# Patient Record
Sex: FEMALE | Race: WHITE | Employment: UNEMPLOYED | URBAN - METROPOLITAN AREA
[De-identification: names, ages, dates, MRNs, and addresses within clinical notes are randomized per-mention and may not be internally consistent; named-entity substitution may affect disease eponyms.]

---

## 2017-06-28 RX ORDER — FLUOXETINE HYDROCHLORIDE 40 MG/1
CAPSULE ORAL
Qty: 30 CAPSULE | Refills: 5 | Status: SHIPPED | OUTPATIENT
Start: 2017-06-28 | End: 2017-12-13 | Stop reason: SDUPTHER

## 2017-12-06 RX ORDER — LISINOPRIL 10 MG/1
TABLET ORAL
Qty: 60 TABLET | Refills: 0 | Status: SHIPPED | OUTPATIENT
Start: 2017-12-06 | End: 2017-12-13

## 2017-12-13 ENCOUNTER — OFFICE VISIT (OUTPATIENT)
Dept: INTERNAL MEDICINE CLINIC | Age: 52
End: 2017-12-13

## 2017-12-13 VITALS
RESPIRATION RATE: 16 BRPM | HEIGHT: 63 IN | HEART RATE: 82 BPM | DIASTOLIC BLOOD PRESSURE: 80 MMHG | BODY MASS INDEX: 51.91 KG/M2 | WEIGHT: 293 LBS | OXYGEN SATURATION: 98 % | SYSTOLIC BLOOD PRESSURE: 136 MMHG

## 2017-12-13 DIAGNOSIS — E78.2 MIXED HYPERLIPIDEMIA: ICD-10-CM

## 2017-12-13 DIAGNOSIS — R53.83 FATIGUE, UNSPECIFIED TYPE: ICD-10-CM

## 2017-12-13 DIAGNOSIS — E66.09 CLASS 1 OBESITY DUE TO EXCESS CALORIES WITHOUT SERIOUS COMORBIDITY IN ADULT, UNSPECIFIED BMI: ICD-10-CM

## 2017-12-13 DIAGNOSIS — I10 ESSENTIAL HYPERTENSION: Primary | ICD-10-CM

## 2017-12-13 DIAGNOSIS — F33.0 MILD EPISODE OF RECURRENT MAJOR DEPRESSIVE DISORDER (HCC): ICD-10-CM

## 2017-12-13 LAB
A/G RATIO: 1.4 (ref 1.1–2.2)
ALBUMIN SERPL-MCNC: 4.5 G/DL (ref 3.4–5)
ALP BLD-CCNC: 122 U/L (ref 40–129)
ALT SERPL-CCNC: 23 U/L (ref 10–40)
ANION GAP SERPL CALCULATED.3IONS-SCNC: 14 MMOL/L (ref 3–16)
AST SERPL-CCNC: 20 U/L (ref 15–37)
BILIRUB SERPL-MCNC: 0.3 MG/DL (ref 0–1)
BUN BLDV-MCNC: 23 MG/DL (ref 7–20)
CALCIUM SERPL-MCNC: 10.2 MG/DL (ref 8.3–10.6)
CHLORIDE BLD-SCNC: 99 MMOL/L (ref 99–110)
CHOLESTEROL, TOTAL: 199 MG/DL (ref 0–199)
CO2: 27 MMOL/L (ref 21–32)
CREAT SERPL-MCNC: 0.6 MG/DL (ref 0.6–1.1)
GFR AFRICAN AMERICAN: >60
GFR NON-AFRICAN AMERICAN: >60
GLOBULIN: 3.2 G/DL
GLUCOSE BLD-MCNC: 88 MG/DL (ref 70–99)
HDLC SERPL-MCNC: 61 MG/DL (ref 40–60)
LDL CHOLESTEROL CALCULATED: 122 MG/DL
POTASSIUM SERPL-SCNC: 4.8 MMOL/L (ref 3.5–5.1)
SODIUM BLD-SCNC: 140 MMOL/L (ref 136–145)
TOTAL PROTEIN: 7.7 G/DL (ref 6.4–8.2)
TRIGL SERPL-MCNC: 79 MG/DL (ref 0–150)
TSH REFLEX: 2.04 UIU/ML (ref 0.27–4.2)
VLDLC SERPL CALC-MCNC: 16 MG/DL

## 2017-12-13 PROCEDURE — 99214 OFFICE O/P EST MOD 30 MIN: CPT | Performed by: INTERNAL MEDICINE

## 2017-12-13 PROCEDURE — 36415 COLL VENOUS BLD VENIPUNCTURE: CPT | Performed by: INTERNAL MEDICINE

## 2017-12-13 RX ORDER — COVID-19 ANTIGEN TEST
KIT MISCELLANEOUS PRN
COMMUNITY

## 2017-12-13 RX ORDER — FLUOXETINE HYDROCHLORIDE 40 MG/1
CAPSULE ORAL
Qty: 30 CAPSULE | Refills: 5 | Status: SHIPPED | OUTPATIENT
Start: 2017-12-13 | End: 2018-09-07 | Stop reason: SDUPTHER

## 2017-12-13 RX ORDER — LISINOPRIL 10 MG/1
TABLET ORAL
Qty: 60 TABLET | Refills: 5 | Status: SHIPPED | OUTPATIENT
Start: 2017-12-13 | End: 2018-08-03 | Stop reason: SDUPTHER

## 2017-12-13 ASSESSMENT — ENCOUNTER SYMPTOMS
COUGH: 0
CHEST TIGHTNESS: 0
GASTROINTESTINAL NEGATIVE: 1
SHORTNESS OF BREATH: 0
BACK PAIN: 1

## 2018-08-03 DIAGNOSIS — I10 ESSENTIAL HYPERTENSION: ICD-10-CM

## 2018-08-03 RX ORDER — LISINOPRIL 10 MG/1
TABLET ORAL
Qty: 60 TABLET | Refills: 5 | Status: SHIPPED | OUTPATIENT
Start: 2018-08-03 | End: 2019-02-13 | Stop reason: SDUPTHER

## 2018-09-07 DIAGNOSIS — F33.0 MILD EPISODE OF RECURRENT MAJOR DEPRESSIVE DISORDER (HCC): ICD-10-CM

## 2018-09-07 RX ORDER — FLUOXETINE HYDROCHLORIDE 40 MG/1
CAPSULE ORAL
Qty: 30 CAPSULE | Refills: 2 | Status: SHIPPED | OUTPATIENT
Start: 2018-09-07 | End: 2018-12-21 | Stop reason: SDUPTHER

## 2018-12-21 DIAGNOSIS — F33.0 MILD EPISODE OF RECURRENT MAJOR DEPRESSIVE DISORDER (HCC): ICD-10-CM

## 2018-12-21 RX ORDER — FLUOXETINE HYDROCHLORIDE 40 MG/1
CAPSULE ORAL
Qty: 30 CAPSULE | Refills: 2 | Status: SHIPPED | OUTPATIENT
Start: 2018-12-21 | End: 2019-03-29 | Stop reason: SDUPTHER

## 2019-02-13 DIAGNOSIS — I10 ESSENTIAL HYPERTENSION: ICD-10-CM

## 2019-02-13 RX ORDER — LISINOPRIL 10 MG/1
TABLET ORAL
Qty: 60 TABLET | Refills: 5 | Status: SHIPPED | OUTPATIENT
Start: 2019-02-13 | End: 2019-12-30

## 2019-03-29 DIAGNOSIS — F33.0 MILD EPISODE OF RECURRENT MAJOR DEPRESSIVE DISORDER (HCC): ICD-10-CM

## 2019-03-29 RX ORDER — FLUOXETINE HYDROCHLORIDE 40 MG/1
CAPSULE ORAL
Qty: 30 CAPSULE | Refills: 0 | Status: SHIPPED | OUTPATIENT
Start: 2019-03-29 | End: 2019-04-23 | Stop reason: SDUPTHER

## 2019-04-23 ENCOUNTER — OFFICE VISIT (OUTPATIENT)
Dept: INTERNAL MEDICINE CLINIC | Age: 54
End: 2019-04-23
Payer: COMMERCIAL

## 2019-04-23 VITALS
WEIGHT: 293 LBS | DIASTOLIC BLOOD PRESSURE: 82 MMHG | HEART RATE: 82 BPM | SYSTOLIC BLOOD PRESSURE: 142 MMHG | BODY MASS INDEX: 54.03 KG/M2 | OXYGEN SATURATION: 98 %

## 2019-04-23 DIAGNOSIS — F32.4 MAJOR DEPRESSIVE DISORDER WITH SINGLE EPISODE, IN PARTIAL REMISSION (HCC): ICD-10-CM

## 2019-04-23 DIAGNOSIS — E78.2 MIXED HYPERLIPIDEMIA: ICD-10-CM

## 2019-04-23 DIAGNOSIS — I10 ESSENTIAL HYPERTENSION: Primary | ICD-10-CM

## 2019-04-23 DIAGNOSIS — F33.0 MILD EPISODE OF RECURRENT MAJOR DEPRESSIVE DISORDER (HCC): ICD-10-CM

## 2019-04-23 LAB
ANION GAP SERPL CALCULATED.3IONS-SCNC: 14 MMOL/L (ref 3–16)
BUN BLDV-MCNC: 23 MG/DL (ref 7–20)
CALCIUM SERPL-MCNC: 9.8 MG/DL (ref 8.3–10.6)
CHLORIDE BLD-SCNC: 102 MMOL/L (ref 99–110)
CO2: 25 MMOL/L (ref 21–32)
CREAT SERPL-MCNC: 0.7 MG/DL (ref 0.6–1.1)
GFR AFRICAN AMERICAN: >60
GFR NON-AFRICAN AMERICAN: >60
GLUCOSE BLD-MCNC: 94 MG/DL (ref 70–99)
LDL CHOLESTEROL DIRECT: 125 MG/DL
POTASSIUM SERPL-SCNC: 4.7 MMOL/L (ref 3.5–5.1)
SODIUM BLD-SCNC: 141 MMOL/L (ref 136–145)

## 2019-04-23 PROCEDURE — 36415 COLL VENOUS BLD VENIPUNCTURE: CPT | Performed by: INTERNAL MEDICINE

## 2019-04-23 PROCEDURE — 99214 OFFICE O/P EST MOD 30 MIN: CPT | Performed by: INTERNAL MEDICINE

## 2019-04-23 RX ORDER — FLUOXETINE HYDROCHLORIDE 40 MG/1
CAPSULE ORAL
Qty: 30 CAPSULE | Refills: 5 | Status: SHIPPED | OUTPATIENT
Start: 2019-04-23 | End: 2020-01-10 | Stop reason: SDUPTHER

## 2019-04-23 ASSESSMENT — ENCOUNTER SYMPTOMS
RESPIRATORY NEGATIVE: 1
GASTROINTESTINAL NEGATIVE: 1

## 2019-04-23 NOTE — PROGRESS NOTES
Subjective:      Patient ID: Dianleys Baer is a 47 y.o. female. HPI  Koko Chaves is here for follow-up of HTN and depression. HTN - The patient denies any chest pain, shortness or breath, headaches or palpitations. There is no lower extremity edema. Continues to use the medication as prescribed (Lisininorpil) and is having no side effects. Depression - on Prozac 40 mg daily. Has been off the med for a few days and notices a difference. When using it she is calmer and mood is better. HL - not currently on a medication. Unfortunately her weight has not declined any. Review of Systems   Constitutional: Negative. Respiratory: Negative. Gastrointestinal: Negative. Musculoskeletal: Positive for arthralgias. Psychiatric/Behavioral: Negative for dysphoric mood. The patient is not nervous/anxious. 14 point ros otherwise negative. Objective:   Physical Exam   Constitutional: She is oriented to person, place, and time. No distress. Obese. Eyes: Pupils are equal, round, and reactive to light. EOM are normal.   Neck: No JVD present. Cardiovascular: Normal rate, regular rhythm and normal heart sounds. Pulmonary/Chest: Effort normal and breath sounds normal.   Abdominal: Soft. Bowel sounds are normal. There is no tenderness. Musculoskeletal: She exhibits no edema. Neurological: She is alert and oriented to person, place, and time. Psychiatric: She has a normal mood and affect. Assessment:      1. Essential hypertension    2. Mixed hyperlipidemia    3. Major depressive disorder with single episode, in partial remission ([facility] [address]    4. Mild episode of recurrent major depressive disorder ([facility] [address]            Plan:      Koko Chaves was seen today for medication refill. Diagnoses and all orders for this visit:    Essential hypertension, controlled  -     Basic Metabolic Panel; Future  - Continue Lisinopril    Mixed hyperlipidemia  -     LDL Cholesterol, Direct;  Future    Major depressive disorder with single episode, in partial remission (HCC)        - Continue Prozac    Mild episode of recurrent major depressive disorder (HCC)  -     FLUoxetine (PROZAC) 40 MG capsule; TAKE ONE CAPSULE BY MOUTH DAILY      Recommend colonoscopy and annual gyn checkups. Also counseled on diet and weight loss.           Louis Negron MD

## 2019-06-13 ENCOUNTER — TELEPHONE (OUTPATIENT)
Dept: INTERNAL MEDICINE CLINIC | Age: 54
End: 2019-06-13

## 2019-12-30 DIAGNOSIS — I10 ESSENTIAL HYPERTENSION: ICD-10-CM

## 2019-12-30 RX ORDER — LISINOPRIL 10 MG/1
TABLET ORAL
Qty: 180 TABLET | Refills: 0 | Status: SHIPPED | OUTPATIENT
Start: 2019-12-30 | End: 2022-06-22

## 2020-01-10 RX ORDER — FLUOXETINE HYDROCHLORIDE 40 MG/1
CAPSULE ORAL
Qty: 30 CAPSULE | Refills: 2 | Status: SHIPPED | OUTPATIENT
Start: 2020-01-10 | End: 2020-03-27

## 2020-01-10 NOTE — TELEPHONE ENCOUNTER
Requesting refill for FLUoxetine (PROZAC) 40 MG capsule     Richard Hand    Last ov 4.23.19  Last refill 4.23.19  Future Appointments   Date Time Provider Torres Flores   1/13/2020 10:40 AM Case Villareal MD [facility] IM MMA     rx pended

## 2020-01-21 ENCOUNTER — OFFICE VISIT (OUTPATIENT)
Dept: INTERNAL MEDICINE CLINIC | Age: 55
End: 2020-01-21
Payer: COMMERCIAL

## 2020-01-21 VITALS
BODY MASS INDEX: 51.91 KG/M2 | HEIGHT: 63 IN | DIASTOLIC BLOOD PRESSURE: 74 MMHG | SYSTOLIC BLOOD PRESSURE: 136 MMHG | RESPIRATION RATE: 18 BRPM | TEMPERATURE: 98.1 F | OXYGEN SATURATION: 98 % | HEART RATE: 91 BPM | WEIGHT: 293 LBS

## 2020-01-21 LAB
CHOLESTEROL, TOTAL: 188 MG/DL (ref 0–199)
HDLC SERPL-MCNC: 55 MG/DL (ref 40–60)
LDL CHOLESTEROL CALCULATED: 112 MG/DL
TRIGL SERPL-MCNC: 104 MG/DL (ref 0–150)
VLDLC SERPL CALC-MCNC: 21 MG/DL

## 2020-01-21 PROCEDURE — 90750 HZV VACC RECOMBINANT IM: CPT | Performed by: INTERNAL MEDICINE

## 2020-01-21 PROCEDURE — 90472 IMMUNIZATION ADMIN EACH ADD: CPT | Performed by: INTERNAL MEDICINE

## 2020-01-21 PROCEDURE — 90715 TDAP VACCINE 7 YRS/> IM: CPT | Performed by: INTERNAL MEDICINE

## 2020-01-21 PROCEDURE — 36415 COLL VENOUS BLD VENIPUNCTURE: CPT | Performed by: INTERNAL MEDICINE

## 2020-01-21 PROCEDURE — 90471 IMMUNIZATION ADMIN: CPT | Performed by: INTERNAL MEDICINE

## 2020-01-21 PROCEDURE — 99214 OFFICE O/P EST MOD 30 MIN: CPT | Performed by: INTERNAL MEDICINE

## 2020-01-21 SDOH — ECONOMIC STABILITY: TRANSPORTATION INSECURITY
IN THE PAST 12 MONTHS, HAS THE LACK OF TRANSPORTATION KEPT YOU FROM MEDICAL APPOINTMENTS OR FROM GETTING MEDICATIONS?: NO

## 2020-01-21 SDOH — ECONOMIC STABILITY: TRANSPORTATION INSECURITY
IN THE PAST 12 MONTHS, HAS LACK OF TRANSPORTATION KEPT YOU FROM MEETINGS, WORK, OR FROM GETTING THINGS NEEDED FOR DAILY LIVING?: NO

## 2020-01-21 SDOH — ECONOMIC STABILITY: INCOME INSECURITY: HOW HARD IS IT FOR YOU TO PAY FOR THE VERY BASICS LIKE FOOD, HOUSING, MEDICAL CARE, AND HEATING?: NOT HARD AT ALL

## 2020-01-21 SDOH — ECONOMIC STABILITY: FOOD INSECURITY: WITHIN THE PAST 12 MONTHS, YOU WORRIED THAT YOUR FOOD WOULD RUN OUT BEFORE YOU GOT MONEY TO BUY MORE.: NEVER TRUE

## 2020-01-21 SDOH — ECONOMIC STABILITY: FOOD INSECURITY: WITHIN THE PAST 12 MONTHS, THE FOOD YOU BOUGHT JUST DIDN'T LAST AND YOU DIDN'T HAVE MONEY TO GET MORE.: NEVER TRUE

## 2020-01-21 ASSESSMENT — ENCOUNTER SYMPTOMS
RESPIRATORY NEGATIVE: 1
GASTROINTESTINAL NEGATIVE: 1

## 2020-01-21 NOTE — PROGRESS NOTES
Lipid  Subjective:      Patient ID: Suzy Yu is a 49 y.o. female. HPI   Aline is here for a semi-annual exam.    HTN - The patient denies any chest pain, shortness or breath, headaches or palpitations. There is no lower extremity edema. Continues to use the medication as prescribed (Liisnopril) and is having no side effects. HL - on no medication. Her last LDL was 125. Depression - she remains on Prozac 40 mg daily. She is seeing a psyche in the [facility]. She finds it valuable. Review of Systems   Constitutional: Negative. Respiratory: Negative. Cardiovascular: Negative. Gastrointestinal: Negative. Musculoskeletal: Positive for arthralgias. Psychiatric/Behavioral: Positive for decreased concentration. 14 point ros otherwise negative. Objective:   Physical Exam  Constitutional:       Appearance: She is obese. Cardiovascular:      Rate and Rhythm: Normal rate and regular rhythm. Pulmonary:      Effort: Pulmonary effort is normal.      Breath sounds: Normal breath sounds. Abdominal:      General: There is no distension. Palpations: Abdomen is soft. Skin:     General: Skin is warm and dry. Neurological:      General: No focal deficit present. Mental Status: She is alert. Assessment:      1. Essential hypertension    2. Mixed hyperlipidemia    3. Breast cancer screening    4. Immunization due    5. Colon cancer screening            Plan:      Yue Berrios was seen today for hypertension. Diagnoses and all orders for this visit:    Essential hypertension, controlled        - Continue Lisinopril    Mixed hyperlipidemia  -     Lipid Panel; Future    Breast cancer screening  -     HOLLI DIGITAL SCREENING AUGMENTED BILATERAL; Future    Immunization due  -     Zoster recombinant [facility])  -     Tdap (age 6y and older) IM ([address])    Colon cancer screening  -     POCT Fecal Immunochemical Test (FIT);  Future - cannot afford

## 2020-01-23 RX ORDER — ATORVASTATIN CALCIUM 10 MG/1
10 TABLET, FILM COATED ORAL DAILY
Qty: 30 TABLET | Refills: 5 | Status: SHIPPED | OUTPATIENT
Start: 2020-01-23 | End: 2020-03-27 | Stop reason: SDUPTHER

## 2020-03-27 ENCOUNTER — OFFICE VISIT (OUTPATIENT)
Dept: FAMILY MEDICINE CLINIC | Age: 55
End: 2020-03-27
Payer: COMMERCIAL

## 2020-03-27 VITALS
DIASTOLIC BLOOD PRESSURE: 70 MMHG | BODY MASS INDEX: 53.57 KG/M2 | WEIGHT: 293 LBS | HEART RATE: 94 BPM | OXYGEN SATURATION: 97 % | SYSTOLIC BLOOD PRESSURE: 122 MMHG

## 2020-03-27 DIAGNOSIS — Z78.0 POSTMENOPAUSAL: ICD-10-CM

## 2020-03-27 DIAGNOSIS — E78.2 MIXED HYPERLIPIDEMIA: ICD-10-CM

## 2020-03-27 DIAGNOSIS — I10 ESSENTIAL HYPERTENSION: ICD-10-CM

## 2020-03-27 PROBLEM — L30.9 CHRONIC DERMATITIS: Status: ACTIVE | Noted: 2018-05-24

## 2020-03-27 PROBLEM — M70.61 TROCHANTERIC BURSITIS OF RIGHT HIP: Status: ACTIVE | Noted: 2020-03-27

## 2020-03-27 PROBLEM — Z80.8 FAMILY HISTORY OF MELANOMA: Status: ACTIVE | Noted: 2019-08-19

## 2020-03-27 LAB
A/G RATIO: 1.5 (ref 1.1–2.2)
ALBUMIN SERPL-MCNC: 4.5 G/DL (ref 3.4–5)
ALP BLD-CCNC: 125 U/L (ref 40–129)
ALT SERPL-CCNC: 20 U/L (ref 10–40)
ANION GAP SERPL CALCULATED.3IONS-SCNC: 13 MMOL/L (ref 3–16)
AST SERPL-CCNC: 18 U/L (ref 15–37)
BASOPHILS ABSOLUTE: 0.1 K/UL (ref 0–0.2)
BASOPHILS RELATIVE PERCENT: 0.5 %
BILIRUB SERPL-MCNC: 0.4 MG/DL (ref 0–1)
BUN BLDV-MCNC: 19 MG/DL (ref 7–20)
CALCIUM SERPL-MCNC: 10.3 MG/DL (ref 8.3–10.6)
CHLORIDE BLD-SCNC: 101 MMOL/L (ref 99–110)
CO2: 27 MMOL/L (ref 21–32)
CREAT SERPL-MCNC: 0.7 MG/DL (ref 0.6–1.1)
EOSINOPHILS ABSOLUTE: 0 K/UL (ref 0–0.6)
EOSINOPHILS RELATIVE PERCENT: 0.1 %
GFR AFRICAN AMERICAN: >60
GFR NON-AFRICAN AMERICAN: >60
GLOBULIN: 3 G/DL
GLUCOSE BLD-MCNC: 92 MG/DL (ref 70–99)
HCT VFR BLD CALC: 42.9 % (ref 36–48)
HEMOGLOBIN: 13.9 G/DL (ref 12–16)
LYMPHOCYTES ABSOLUTE: 2.7 K/UL (ref 1–5.1)
LYMPHOCYTES RELATIVE PERCENT: 22.4 %
MCH RBC QN AUTO: 27.6 PG (ref 26–34)
MCHC RBC AUTO-ENTMCNC: 32.3 G/DL (ref 31–36)
MCV RBC AUTO: 85.3 FL (ref 80–100)
MONOCYTES ABSOLUTE: 1 K/UL (ref 0–1.3)
MONOCYTES RELATIVE PERCENT: 8.2 %
NEUTROPHILS ABSOLUTE: 8.4 K/UL (ref 1.7–7.7)
NEUTROPHILS RELATIVE PERCENT: 68.8 %
PDW BLD-RTO: 14.8 % (ref 12.4–15.4)
PLATELET # BLD: 312 K/UL (ref 135–450)
PMV BLD AUTO: 8.2 FL (ref 5–10.5)
POTASSIUM SERPL-SCNC: 4.9 MMOL/L (ref 3.5–5.1)
RBC # BLD: 5.03 M/UL (ref 4–5.2)
SODIUM BLD-SCNC: 141 MMOL/L (ref 136–145)
TOTAL PROTEIN: 7.5 G/DL (ref 6.4–8.2)
TSH REFLEX: 1.99 UIU/ML (ref 0.27–4.2)
VITAMIN D 25-HYDROXY: 19.9 NG/ML
WBC # BLD: 12.2 K/UL (ref 4–11)

## 2020-03-27 PROCEDURE — 99214 OFFICE O/P EST MOD 30 MIN: CPT | Performed by: FAMILY MEDICINE

## 2020-03-27 RX ORDER — LISINOPRIL 20 MG/1
20 TABLET ORAL DAILY
Qty: 90 TABLET | Refills: 1 | Status: SHIPPED | OUTPATIENT
Start: 2020-03-27 | End: 2020-09-28

## 2020-03-27 RX ORDER — ATORVASTATIN CALCIUM 10 MG/1
10 TABLET, FILM COATED ORAL DAILY
Qty: 90 TABLET | Refills: 1 | Status: SHIPPED | OUTPATIENT
Start: 2020-03-27 | End: 2020-06-15 | Stop reason: SDUPTHER

## 2020-03-27 RX ORDER — FLUOXETINE HYDROCHLORIDE 20 MG/1
60 CAPSULE ORAL DAILY
Qty: 90 CAPSULE | Refills: 1 | Status: SHIPPED | OUTPATIENT
Start: 2020-03-27 | End: 2020-03-30 | Stop reason: SDUPTHER

## 2020-03-27 ASSESSMENT — ENCOUNTER SYMPTOMS
ABDOMINAL PAIN: 0
VOMITING: 0
NAUSEA: 0
COLOR CHANGE: 0
BACK PAIN: 0
CHEST TIGHTNESS: 0
SHORTNESS OF BREATH: 0

## 2020-03-27 NOTE — PATIENT INSTRUCTIONS
1200 mg calcium  2000 IU Vitamin D3      Patient Education        Trochanteric Bursitis: Exercises  Introduction  Here are some examples of exercises for you to try. The exercises may be suggested for a condition or for rehabilitation. Start each exercise slowly. Ease off the exercises if you start to have pain. You will be told when to start these exercises and which ones will work best for you. How to do the exercises  Hamstring wall stretch   1. Lie on your back in a doorway, with your good leg through the open door. 2. Slide your affected leg up the wall to straighten your knee. You should feel a gentle stretch down the back of your leg. 1. Do not arch your back. 2. Do not bend either knee. 3. Keep one heel touching the floor and the other heel touching the wall. Do not point your toes. 3. Hold the stretch for at least 1 minute to begin. Then try to lengthen the time you hold the stretch to as long as 6 minutes. 4. Repeat 2 to 4 times. 5. If you do not have a place to do this exercise in a doorway, there is another way to do it:  6. Lie on your back, and bend the knee of your affected leg. 7. Loop a towel under the ball and toes of that foot, and hold the ends of the towel in your hands. 8. Straighten your knee, and slowly pull back on the towel. You should feel a gentle stretch down the back of your leg. 9. Hold the stretch for 15 to 30 seconds. Or even better, hold the stretch for 1 minute if you can. 10. Repeat 2 to 4 times. Straight-leg raises to the outside   1. Lie on your side, with your affected leg on top. 2. Tighten the front thigh muscles of your top leg to keep your knee straight. 3. Keep your hip and your leg straight in line with the rest of your body, and keep your knee pointing forward. Do not drop your hip back. 4. Lift your top leg straight up toward the ceiling, about 12 inches off the floor. Hold for about 6 seconds, then slowly lower your leg. 5. Repeat 8 to 12 times. Clamshell   1. Lie on your side, with your affected leg on top and your head propped on a pillow. Keep your feet and knees together and your knees bent. 2. Raise your top knee, but keep your feet together. Do not let your hips roll back. Your legs should open up like a clamshell. 3. Hold for 6 seconds. 4. Slowly lower your knee back down. Rest for 10 seconds. 5. Repeat 8 to 12 times. Standing quadriceps stretch   1. If you are not steady on your feet, hold on to a chair, counter, or wall. You can also lie on your stomach or your side to do this exercise. 2. Bend the knee of the leg you want to stretch, and reach behind you to grab the front of your foot or ankle with the hand on the same side. For example, if you are stretching your right leg, use your right hand. 3. Keeping your knees next to each other, pull your foot toward your buttock until you feel a gentle stretch across the front of your hip and down the front of your thigh. Your knee should be pointed directly to the ground, and not out to the side. 4. Hold the stretch for 15 to 30 seconds. 5. Repeat 2 to 4 times. Piriformis stretch   1. Lie on your back with your legs straight. 2. Lift your affected leg and bend your knee. With your opposite hand, reach across your body, and then gently pull your knee toward your opposite shoulder. 3. Hold the stretch for 15 to 30 seconds. 4. Repeat 2 to 4 times. Double knee-to-chest   1. Lie on your back with your knees bent and your feet flat on the floor. You can put a small pillow under your head and neck if it is more comfortable. 2. Bring both knees to your chest.  3. Keep your lower back pressed to the floor. Hold for 15 to 30 seconds. 4. Relax, and lower your knees to the starting position. 5. Repeat 2 to 4 times. Follow-up care is a key part of your treatment and safety. Be sure to make and go to all appointments, and call your doctor if you are having problems.  It's also a good idea to know your test results and keep a list of the medicines you take. Where can you learn more? Go to https://chpepiceweb.[email or web address] org and sign in to your Neonode account. Enter Z391 in the [facility] box to learn more about \"Trochanteric Bursitis: Exercises. \"     If you do not have an account, please click on the \"Sign Up Now\" link. Current as of: June 26, 2019Content Version: 12.4  © 6282-5560 Healthwise, Incorporated. Care instructions adapted under license by [facility] ([facility]). If you have questions about a medical condition or this instruction, always ask your healthcare professional. [place] [address] any warranty or liability for your use of this information. Patient Education        Hip Bursitis: Exercises  Introduction  Here are some examples of exercises for you to try. The exercises may be suggested for a condition or for rehabilitation. Start each exercise slowly. Ease off the exercises if you start to have pain. You will be told when to start these exercises and which ones will work best for you. How to do the exercises  Hip rotator stretch   1. Lie on your back with both knees bent and your feet flat on the floor. 2. Put the ankle of your affected leg on your opposite thigh near your knee. 3. Use your hand to gently push your knee away from your body until you feel a gentle stretch around your hip. 4. Hold the stretch for 15 to 30 seconds. 5. Repeat 2 to 4 times. 6. Repeat steps 1 through 5, but this time use your hand to gently pull your knee toward your opposite shoulder. Iliotibial band stretch   1. Lean sideways against a wall. If you are not steady on your feet, hold on to a chair or counter. 2. Stand on the leg with the affected hip, with that leg close to the wall. Then cross your other leg in front of it. 3. Let your affected hip drop out to the side of your body and against wall.  Then lean away from your affected hip until you feel a stretch. 4. Hold the stretch for 15 to 30 seconds. 5. Repeat 2 to 4 times. Straight-leg raises to the outside   1. Lie on your side, with your affected hip on top. 2. Tighten the front thigh muscles of your top leg to keep your knee straight. 3. Keep your hip and your leg straight in line with the rest of your body, and keep your knee pointing forward. Do not drop your hip back. 4. Lift your top leg straight up toward the ceiling, about 12 inches off the floor. Hold for about 6 seconds, then slowly lower your leg. 5. Repeat 8 to 12 times. Clamshell   1. Lie on your side, with your affected hip on top and your head propped on a pillow. Keep your feet and knees together and your knees bent. 2. Raise your top knee, but keep your feet together. Do not let your hips roll back. Your legs should open up like a clamshell. 3. Hold for 6 seconds. 4. Slowly lower your knee back down. Rest for 10 seconds. 5. Repeat 8 to 12 times. Follow-up care is a key part of your treatment and safety. Be sure to make and go to all appointments, and call your doctor if you are having problems. It's also a good idea to know your test results and keep a list of the medicines you take. Where can you learn more? Go to https://[employer] R[employer] Studiosrdeb.[email or web address] org and sign in to your ItrybeforeIbuy account. Enter U079 in the [employer] box to learn more about \"Hip Bursitis: Exercises. \"     If you do not have an account, please click on the \"Sign Up Now\" link. Current as of: June 26, 2019Content Version: 12.4  © 4072-1900 Healthwise, Incorporated. Care instructions adapted under license by [facility] ([facility]). If you have questions about a medical condition or this instruction, always ask your healthcare professional. [place] [address] any warranty or liability for your use of this information.          Patient Education        Hip Bursitis: Care Instructions  Your Care Instructions    Bursitis is inflammation of the bursa. A bursa is a small sac of fluid that cushions a joint and helps it move easily. A bursa sits between a bone in the hip and the muscles and tendons in the thigh and buttock. Injury or overuse of the hip can cause bursitis. Activities that can lead to bursitis include twisting and rapid joint movement. Bursitis can cause hip pain. Bursitis usually gets better if you avoid the activity that caused it. If pain lasts or gets worse despite home treatment, your doctor may draw fluid from the bursa through a needle. This may relieve your pain and help your doctor know if you have an infection. If so, your doctor will prescribe antibiotics. If you have inflammation only, you may get a corticosteroid shot to reduce swelling and pain. Sometimes surgery is needed to drain or remove the bursa. Follow-up care is a key part of your treatment and safety. Be sure to make and go to all appointments, and call your doctor if you are having problems. It's also a good idea to know your test results and keep a list of the medicines you take. How can you care for yourself at home? · Put ice or a cold pack on your hip for 10 to 20 minutes at a time. Put a thin cloth between the ice and your skin. · After 3 days of using ice, you may use heat on your hip. You can use a hot water bottle, a heating pad set on low, or a warm, moist towel. · Rest your hip. Stop any activities that cause pain. Switch to activities that do not stress your hip. · Take your medicines exactly as prescribed. Call your doctor if you think you are having a problem with your medicine. · Ask your doctor if you can take an over-the-counter pain medicine, such as acetaminophen (Tylenol), ibuprofen (Advil, Motrin), or naproxen (Aleve). Be safe with medicines. Read and follow all instructions on the label. · To prevent stiffness, gently move the hip joint as much as you can without pain every day.  As the pain gets better, keep doing range-of-motion

## 2020-03-27 NOTE — PROGRESS NOTES
3/27/2020    This is a 49 y.o. female who presents for  Chief Complaint   Patient presents with    New Patient    Established New Doctor       HPI:     HTN:   Taking medication daily  Checking Bps at home  No new AEs to the medication  No acute concerning Sxs: No CP, SOB, palpitations, dizziness, HA, visual changes, diaphoresis, nausea, etc.     HLD: taking Lipitor nightly, no new AEs  Started in January    Moods: stable on Prozac  No SI/HI     Dr. Chepe Arias, sees once a year, prescribes the minocycline  Sees podiatry     LMP at age 45  Hx of IVF, one round with donor eggs     + R hip pain  On the side  Intermittent      Past Medical History:   Diagnosis Date    Depression     Hypertension        Past Surgical History:   Procedure Laterality Date    CYST REMOVAL      from head not residual problems       Social History     Socioeconomic History    Marital status:      Spouse name: Not on file    Number of children: Not on file    Years of education: Not on file    Highest education level: Not on file   Occupational History    Not on file   Social Needs    Financial resource strain: Not hard at all   [employer] insecurity     Worry: Never true     Inability: Never true   [employer] needs     Medical: No     Non-medical: No   Tobacco Use    Smoking status: Never Smoker    Smokeless tobacco: Never Used   Substance and Sexual Activity    Alcohol use: No    Drug use: No    Sexual activity: Yes     Partners: Male   Lifestyle    Physical activity     Days per week: Not on file     Minutes per session: Not on file    Stress: Not on file   Relationships    Social connections     Talks on phone: Not on file     Gets together: Not on file     Attends Anabaptism service: Not on file     Active member of club or organization: Not on file     Attends meetings of clubs or organizations: Not on file     Relationship status: Not on file    Intimate partner violence     Fear of current or ex partner: Not on file     Emotionally abused: Not on file     Physically abused: Not on file     Forced sexual activity: Not on file   Other Topics Concern    Not on file   Social History Narrative    Not on file       No family history on file. Current Outpatient Medications   Medication Sig Dispense Refill    FLUoxetine (PROZAC) 20 MG capsule Take 3 capsules by mouth daily TAKE ONE CAPSULE BY MOUTH DAILY 90 capsule 1    atorvastatin (LIPITOR) 10 MG tablet Take 1 tablet by mouth daily 90 tablet 1    lisinopril (PRINIVIL;ZESTRIL) 20 MG tablet Take 1 tablet by mouth daily 90 tablet 1    lisinopril (PRINIVIL;ZESTRIL) 10 MG tablet TAKE ONE TABLET BY MOUTH TWICE A  tablet 0    Naproxen Sodium (ALEVE) 220 MG CAPS Take by mouth as needed for Pain      minocycline (MINOCIN;DYNACIN) 100 MG capsule 2 capsules daily        No current facility-administered medications for this visit. Immunization History   Administered Date(s) Administered    Influenza A ([id number]) Vaccine Nasal 12/15/2009    Tdap (Boostrix, Adacel) 01/21/2020    Zoster Recombinant (Shingrix) 01/21/2020       No Known Allergies    Review of Systems   Constitutional: Negative for activity change, appetite change, chills, diaphoresis, fatigue, fever and unexpected weight change. Eyes: Negative for visual disturbance. Respiratory: Negative for chest tightness and shortness of breath. Cardiovascular: Negative for chest pain, palpitations and leg swelling. Gastrointestinal: Negative for abdominal pain, nausea and vomiting. Genitourinary: Negative for decreased urine volume. Musculoskeletal: Positive for arthralgias. Negative for back pain, gait problem, joint swelling and myalgias. Skin: Negative for color change. Neurological: Negative for dizziness, tremors, seizures, weakness, light-headedness, numbness and headaches. Psychiatric/Behavioral: Positive for dysphoric mood and sleep disturbance.  Negative for agitation, behavioral Test (FIT); Future    3. Essential hypertension  Stable. Continue with current care plan, which was discussed in detail with the patient. C/w Lisinopril 20 mg daily   - Comprehensive Metabolic Panel; Future  - CBC Auto Differential; Future  - Hemoglobin A1C; Future  - TSH with Reflex; Future    4. Mixed hyperlipidemia  C/w Lipitor 10 mg  Lipid panel due in 1 mo  Check LFTs today  - Hemoglobin A1C; Future  - Lipid Panel; Future    5. Mild episode of recurrent major depressive disorder ([facility] [address]  Inc to 60 mg  Sees a therapist twice monthly  No SI/HI  - FLUoxetine (PROZAC) 20 MG capsule; Take 3 capsules by mouth daily TAKE ONE CAPSULE BY MOUTH DAILY  Dispense: 90 capsule; Refill: 1    6. Postmenopausal  LMP age 45  Discussed Ca/Vit D3 recommendations, in AVS  - Vitamin D 25 Hydroxy; Future    7. Trochanteric bursitis of right hip  Daily heat/exercises/ice regiment discussed in detail, h/o's given   NSAIDs scheduled BID with food for 10-14 days      While assessing care for this patient, I have reviewed all pertinent lab work/imaging/ specialist notes and care in reference to those problems addressed above in detail. Appropriate medical decision making was based on this. Please note that portions of this note may have been completed with a voice recognition program. Efforts were made to edit the dictations but occasionally words are mis-transcribed. Return in about 4 weeks (around 4/24/2020) for e visit for follow up medication changes, and then in 6 mo for f/u HTN.

## 2020-03-28 LAB
ESTIMATED AVERAGE GLUCOSE: 119.8 MG/DL
HBA1C MFR BLD: 5.8 %

## 2020-03-30 ENCOUNTER — TELEPHONE (OUTPATIENT)
Dept: FAMILY MEDICINE CLINIC | Age: 55
End: 2020-03-30

## 2020-03-30 RX ORDER — FLUOXETINE HYDROCHLORIDE 20 MG/1
60 CAPSULE ORAL DAILY
Qty: 90 CAPSULE | Refills: 1 | Status: SHIPPED | OUTPATIENT
Start: 2020-03-30 | End: 2020-06-02 | Stop reason: SDUPTHER

## 2020-06-02 RX ORDER — FLUOXETINE HYDROCHLORIDE 20 MG/1
CAPSULE ORAL
Qty: 90 CAPSULE | Refills: 0 | Status: SHIPPED | OUTPATIENT
Start: 2020-06-02 | End: 2020-06-15 | Stop reason: SDUPTHER

## 2020-06-02 NOTE — TELEPHONE ENCOUNTER
.  Last office visit 3/27/2020     Last written 3/30/20 #90 1 refill    Next office visit scheduled none    Requested Prescriptions     Pending Prescriptions Disp Refills    FLUoxetine (PROZAC) 20 MG capsule [Pharmacy Med Name: FLUoxetine HCL 20 MG CAPSULE] 90 capsule 0     Sig: TAKE THREE CAPSULES BY MOUTH DAILY

## 2020-06-15 ENCOUNTER — TELEPHONE (OUTPATIENT)
Dept: FAMILY MEDICINE CLINIC | Age: 55
End: 2020-06-15

## 2020-06-15 RX ORDER — FLUOXETINE HYDROCHLORIDE 20 MG/1
CAPSULE ORAL
Qty: 270 CAPSULE | Refills: 1 | Status: SHIPPED | OUTPATIENT
Start: 2020-06-15 | End: 2021-01-25

## 2020-06-15 RX ORDER — ATORVASTATIN CALCIUM 10 MG/1
10 TABLET, FILM COATED ORAL DAILY
Qty: 90 TABLET | Refills: 1 | Status: SHIPPED | OUTPATIENT
Start: 2020-06-15 | End: 2021-06-03

## 2020-06-15 NOTE — TELEPHONE ENCOUNTER
Pt would like refill for fluoxetine 20 mg capsule and atorvastatin 10 mg tablet. She said it is a new medicine. Would like to know if doctor needs appt with her to talk about it (vv ok) or have blood work done. Please advise.   [address] [facility]

## 2020-06-16 DIAGNOSIS — E78.2 MIXED HYPERLIPIDEMIA: ICD-10-CM

## 2020-06-17 LAB
CHOLESTEROL, TOTAL: 158 MG/DL (ref 0–199)
HDLC SERPL-MCNC: 54 MG/DL (ref 40–60)
LDL CHOLESTEROL CALCULATED: 90 MG/DL
TRIGL SERPL-MCNC: 68 MG/DL (ref 0–150)
VLDLC SERPL CALC-MCNC: 14 MG/DL

## 2020-09-28 RX ORDER — LISINOPRIL 20 MG/1
TABLET ORAL
Qty: 90 TABLET | Refills: 1 | Status: SHIPPED | OUTPATIENT
Start: 2020-09-28 | End: 2021-04-21

## 2021-01-25 DIAGNOSIS — F33.0 MILD EPISODE OF RECURRENT MAJOR DEPRESSIVE DISORDER (HCC): ICD-10-CM

## 2021-01-25 RX ORDER — FLUOXETINE HYDROCHLORIDE 20 MG/1
CAPSULE ORAL
Qty: 270 CAPSULE | Refills: 0 | Status: SHIPPED | OUTPATIENT
Start: 2021-01-25 | End: 2021-04-21

## 2021-01-25 NOTE — TELEPHONE ENCOUNTER
.  Last office visit 3/27/2020     Last written 6- 270 with 1      Next office visit scheduled Visit date not found    Requested Prescriptions     Pending Prescriptions Disp Refills    FLUoxetine (PROZAC) 20 MG capsule [Pharmacy Med Name: FLUoxetine HCL 20 MG CAPSULE] 270 capsule 0     Sig: TAKE THREE CAPSULES BY MOUTH DAILY

## 2021-04-21 DIAGNOSIS — F33.0 MILD EPISODE OF RECURRENT MAJOR DEPRESSIVE DISORDER (HCC): ICD-10-CM

## 2021-04-21 RX ORDER — LISINOPRIL 20 MG/1
TABLET ORAL
Qty: 90 TABLET | Refills: 0 | Status: SHIPPED | OUTPATIENT
Start: 2021-04-21 | End: 2021-06-21 | Stop reason: ALTCHOICE

## 2021-04-21 RX ORDER — FLUOXETINE HYDROCHLORIDE 20 MG/1
CAPSULE ORAL
Qty: 270 CAPSULE | Refills: 0 | Status: SHIPPED | OUTPATIENT
Start: 2021-04-21 | End: 2021-07-26

## 2021-04-21 NOTE — TELEPHONE ENCOUNTER
Refill Request     Last Seen: 3/27/2020    Last Written: 1/25/2021    Next Appointment:   No future appointments.           Requested Prescriptions     Pending Prescriptions Disp Refills    FLUoxetine (PROZAC) 20 MG capsule [Pharmacy Med Name: FLUoxetine HCL 20 MG CAPSULE] 270 capsule 0     Sig: TAKE THREE CAPSULES BY MOUTH DAILY    lisinopril (PRINIVIL;ZESTRIL) 20 MG tablet [Pharmacy Med Name: LISINOPRIL 20 MG TABLET] 90 tablet 0     Sig: TAKE ONE TABLET BY MOUTH DAILY

## 2021-06-21 ENCOUNTER — TELEPHONE (OUTPATIENT)
Dept: GASTROENTEROLOGY | Age: 56
End: 2021-06-21

## 2021-06-21 ENCOUNTER — HOSPITAL ENCOUNTER (OUTPATIENT)
Dept: GENERAL RADIOLOGY | Age: 56
Discharge: HOME | End: 2021-06-21
Payer: COMMERCIAL

## 2021-06-21 ENCOUNTER — OFFICE VISIT (OUTPATIENT)
Dept: FAMILY MEDICINE CLINIC | Age: 56
End: 2021-06-21
Payer: COMMERCIAL

## 2021-06-21 ENCOUNTER — HOSPITAL ENCOUNTER (OUTPATIENT)
Age: 56
Discharge: HOME | End: 2021-06-21
Payer: COMMERCIAL

## 2021-06-21 VITALS
WEIGHT: 293 LBS | HEIGHT: 63 IN | OXYGEN SATURATION: 97 % | HEART RATE: 90 BPM | BODY MASS INDEX: 51.91 KG/M2 | SYSTOLIC BLOOD PRESSURE: 130 MMHG | DIASTOLIC BLOOD PRESSURE: 72 MMHG

## 2021-06-21 DIAGNOSIS — Z12.31 ENCOUNTER FOR SCREENING MAMMOGRAM FOR MALIGNANT NEOPLASM OF BREAST: ICD-10-CM

## 2021-06-21 DIAGNOSIS — I10 ESSENTIAL HYPERTENSION: ICD-10-CM

## 2021-06-21 DIAGNOSIS — G89.29 CHRONIC PAIN OF RIGHT HIP: ICD-10-CM

## 2021-06-21 DIAGNOSIS — M25.551 CHRONIC PAIN OF RIGHT HIP: ICD-10-CM

## 2021-06-21 DIAGNOSIS — Z12.11 COLON CANCER SCREENING: ICD-10-CM

## 2021-06-21 DIAGNOSIS — Z00.00 ANNUAL PHYSICAL EXAM: Primary | ICD-10-CM

## 2021-06-21 DIAGNOSIS — Z00.00 ANNUAL PHYSICAL EXAM: ICD-10-CM

## 2021-06-21 DIAGNOSIS — M25.841 CYST OF JOINT OF HAND, RIGHT: ICD-10-CM

## 2021-06-21 DIAGNOSIS — E78.2 MIXED HYPERLIPIDEMIA: ICD-10-CM

## 2021-06-21 DIAGNOSIS — N39.46 MIXED STRESS AND URGE URINARY INCONTINENCE: ICD-10-CM

## 2021-06-21 LAB
A/G RATIO: 1.4 (ref 1.1–2.2)
ALBUMIN SERPL-MCNC: 4.3 G/DL (ref 3.4–5)
ALP BLD-CCNC: 129 U/L (ref 40–129)
ALT SERPL-CCNC: 18 U/L (ref 10–40)
ANION GAP SERPL CALCULATED.3IONS-SCNC: 12 MMOL/L (ref 3–16)
AST SERPL-CCNC: 14 U/L (ref 15–37)
BASOPHILS ABSOLUTE: 0.1 K/UL (ref 0–0.2)
BASOPHILS RELATIVE PERCENT: 0.6 %
BILIRUB SERPL-MCNC: 0.4 MG/DL (ref 0–1)
BUN BLDV-MCNC: 20 MG/DL (ref 7–20)
CALCIUM SERPL-MCNC: 9.8 MG/DL (ref 8.3–10.6)
CHLORIDE BLD-SCNC: 104 MMOL/L (ref 99–110)
CHOLESTEROL, TOTAL: 150 MG/DL (ref 0–199)
CO2: 27 MMOL/L (ref 21–32)
CREAT SERPL-MCNC: 0.6 MG/DL (ref 0.6–1.1)
EOSINOPHILS ABSOLUTE: 0 K/UL (ref 0–0.6)
EOSINOPHILS RELATIVE PERCENT: 0.1 %
GFR AFRICAN AMERICAN: >60
GFR NON-AFRICAN AMERICAN: >60
GLOBULIN: 3 G/DL
GLUCOSE BLD-MCNC: 97 MG/DL (ref 70–99)
HCT VFR BLD CALC: 39.9 % (ref 36–48)
HDLC SERPL-MCNC: 56 MG/DL (ref 40–60)
HEMOGLOBIN: 13.2 G/DL (ref 12–16)
LDL CHOLESTEROL CALCULATED: 80 MG/DL
LYMPHOCYTES ABSOLUTE: 2.3 K/UL (ref 1–5.1)
LYMPHOCYTES RELATIVE PERCENT: 24.5 %
MCH RBC QN AUTO: 28.2 PG (ref 26–34)
MCHC RBC AUTO-ENTMCNC: 33.1 G/DL (ref 31–36)
MCV RBC AUTO: 85.2 FL (ref 80–100)
MONOCYTES ABSOLUTE: 0.6 K/UL (ref 0–1.3)
MONOCYTES RELATIVE PERCENT: 6.1 %
NEUTROPHILS ABSOLUTE: 6.5 K/UL (ref 1.7–7.7)
NEUTROPHILS RELATIVE PERCENT: 68.7 %
PDW BLD-RTO: 15 % (ref 12.4–15.4)
PLATELET # BLD: 296 K/UL (ref 135–450)
PMV BLD AUTO: 8.6 FL (ref 5–10.5)
POTASSIUM SERPL-SCNC: 5.1 MMOL/L (ref 3.5–5.1)
RBC # BLD: 4.68 M/UL (ref 4–5.2)
SODIUM BLD-SCNC: 143 MMOL/L (ref 136–145)
TOTAL PROTEIN: 7.3 G/DL (ref 6.4–8.2)
TRIGL SERPL-MCNC: 71 MG/DL (ref 0–150)
TSH REFLEX: 1.93 UIU/ML (ref 0.27–4.2)
VITAMIN D 25-HYDROXY: 18.1 NG/ML
VLDLC SERPL CALC-MCNC: 14 MG/DL
WBC # BLD: 9.5 K/UL (ref 4–11)

## 2021-06-21 PROCEDURE — 99396 PREV VISIT EST AGE 40-64: CPT | Performed by: FAMILY MEDICINE

## 2021-06-21 PROCEDURE — 73502 X-RAY EXAM HIP UNI 2-3 VIEWS: CPT

## 2021-06-21 ASSESSMENT — ENCOUNTER SYMPTOMS
VOMITING: 0
BACK PAIN: 0
CONSTIPATION: 0
TROUBLE SWALLOWING: 0
NAUSEA: 0
SHORTNESS OF BREATH: 0
ABDOMINAL PAIN: 0
COUGH: 0
COLOR CHANGE: 0
DIARRHEA: 0
BLOOD IN STOOL: 0

## 2021-06-21 NOTE — PROGRESS NOTES
Court Frame Nanette  YOB: 1965    Date of Service:  6/21/2021    Chief Complaint:   Hannah Villafana is a 48 y.o. female who presents for a preventative visit     HPI:    Established with me in 3/20    Her son graduated and goes to college in the fall, [address].   Her 13 yr old son is working [employer] aches and pains, hip     Patient's last menstrual period was 01/23/2011.   menstrual cycle: n/a  Sexual activity: has sex with male,    AbnormalSxs: no  Last PAP: ? Years     Last Mammogram: yes - 2/20, reassuring   Family Hx of ovarian, breast, or uterine cancer: yes - sister, 62, no genetic testing   Self-breast exams: yes - no current concerns     Previous DEXA scan: n/a    Previous Colonoscopy no  Family Hx of Colon Ca  no    Exercise: no regular exercise  Diet: eats pretty healthy     + mucoid cyst of finger, R hand, wants to see someone to discuss removal  + chronic R hip pain  + intermittent urinary incontinence, leakage, sometimes with sneezing or coughing     Wt Readings from Last 3 Encounters:   06/21/21 (!) 311 lb (141.1 kg)   03/27/20 (!) 302 lb 6.4 oz (137.2 kg)   01/21/20 (!) 305 lb (138.3 kg)     BP Readings from Last 3 Encounters:   06/21/21 130/72   03/27/20 122/70   01/21/20 136/74       Patient Active Problem List   Diagnosis    HTN (hypertension)    Depression    Mixed hyperlipidemia    Chronic dermatitis    Family history of melanoma    Postmenopausal    Trochanteric bursitis of right hip       Health Maintenance   Topic Date Due    Hepatitis C screen  Never done    HIV screen  Never done    Colon cancer screen colonoscopy  Never done    Cervical cancer screen  10/27/2017    Shingles Vaccine (2 of 2) 03/17/2020    Breast cancer screen  02/27/2021    A1C test (Diabetic or Prediabetic)  03/27/2021    Potassium monitoring  03/27/2021    Creatinine monitoring  03/27/2021    Lipid screen  06/16/2021    Flu vaccine (Season Ended) 09/01/2021    DTaP/Tdap/Td vaccine (2 - Td or Tdap) 01/21/2030    COVID-19 Vaccine  Completed    Hepatitis A vaccine  Aged Out    Hepatitis B vaccine  Aged Out    Hib vaccine  Aged Out    Meningococcal (ACWY) vaccine  Aged Out    Pneumococcal 0-64 years Vaccine  Aged [employer] History   Administered Date(s) Administered    COVID-19, Pfizer, PF, 30mcg/0.3mL 03/29/2021, 04/19/2021    Influenza A ([id number]) Vaccine Nasal 12/15/2009    Tdap (Boostrix, Adacel) 01/21/2020    Zoster Recombinant (Shingrix) 01/21/2020       No Known Allergies  Current Outpatient Medications   Medication Sig Dispense Refill    atorvastatin (LIPITOR) 10 MG tablet TAKE ONE TABLET BY MOUTH DAILY 30 tablet 0    FLUoxetine (PROZAC) 20 MG capsule TAKE THREE CAPSULES BY MOUTH DAILY 270 capsule 0    lisinopril (PRINIVIL;ZESTRIL) 10 MG tablet TAKE ONE TABLET BY MOUTH TWICE A  tablet 0    Naproxen Sodium (ALEVE) 220 MG CAPS Take by mouth as needed for Pain      minocycline (MINOCIN;DYNACIN) 100 MG capsule 2 capsules daily        No current facility-administered medications for this visit.        Past Medical History:   Diagnosis Date    Depression     Hypertension      Past Surgical History:   Procedure Laterality Date    CYST REMOVAL      from head not residual problems     Family History   Problem Relation Age of Onset    Breast Cancer Sister 62     Social History     Socioeconomic History    Marital status:      Spouse name: Not on file    Number of children: Not on file    Years of education: Not on file    Highest education level: Not on file   Occupational History    Not on file   Tobacco Use    Smoking status: Never Smoker    Smokeless tobacco: Never Used   Substance and Sexual Activity    Alcohol use: No    Drug use: No    Sexual activity: Yes     Partners: Male   Other Topics Concern    Not on file   Social History Narrative    Not on file     Social Determinants of Health     Financial Resource Strain:     Difficulty of Paying Living Expenses:    Food Insecurity:     Worried About Running Out of Food in the Last Year:     [address] in the Last Year:    Transportation Needs:     Lack of Transportation (Medical):  Lack of Transportation (Non-Medical):    Physical Activity:     Days of Exercise per Week:     Minutes of Exercise per Session:    Stress:     Feeling of Stress :    Social Connections:     Frequency of Communication with Friends and Family:     Frequency of Social Gatherings with Friends and Family:     Attends Yazidism Services:     Active Member of Clubs or Organizations:     Attends Club or Organization Meetings:     Marital Status:    Intimate Partner Violence:     Fear of Current or Ex-Partner:     Emotionally Abused:     Physically Abused:     Sexually Abused:        Review of Systems:  Review of Systems   Constitutional: Negative for activity change, appetite change, chills, diaphoresis, fatigue, fever and unexpected weight change. HENT: Negative for ear pain, hearing loss and trouble swallowing. Eyes: Negative for visual disturbance. Respiratory: Negative for cough and shortness of breath. Cardiovascular: Negative for chest pain, palpitations and leg swelling. Gastrointestinal: Negative for abdominal pain, blood in stool, constipation, diarrhea, nausea and vomiting. Genitourinary: Negative for decreased urine volume, difficulty urinating, dysuria, flank pain, hematuria and urgency. Musculoskeletal: Positive for arthralgias. Negative for back pain. Skin: Negative for color change and rash. Neurological: Negative for dizziness, weakness, light-headedness, numbness and headaches. Psychiatric/Behavioral: Negative for dysphoric mood and sleep disturbance. The patient is not nervous/anxious.         Physical Exam:   Vitals:    06/21/21 1119   BP: 130/72   Site: Left Upper Arm   Position: Sitting   Cuff Size: Large Adult   Pulse: 90 SpO2: 97%   Weight: (!) 311 lb (141.1 kg)   Height: 5' 2.5\" (1.588 m)     Body mass index is 55.98 kg/m². Physical Exam  Vitals and nursing note reviewed. Constitutional:       General: She is not in acute distress. Appearance: She is well-developed. She is not diaphoretic. HENT:      Head: Normocephalic and atraumatic. Right Ear: External ear normal.      Left Ear: External ear normal.      Mouth/Throat:      Pharynx: No oropharyngeal exudate. Eyes:      General:         Right eye: No discharge. Left eye: No discharge. Conjunctiva/sclera: Conjunctivae normal.      Pupils: Pupils are equal, round, and reactive to light. Neck:      Thyroid: No thyromegaly. Cardiovascular:      Rate and Rhythm: Normal rate and regular rhythm. Heart sounds: Normal heart sounds. No murmur heard. No friction rub. No gallop. Pulmonary:      Effort: Pulmonary effort is normal. No respiratory distress. Breath sounds: Normal breath sounds. No wheezing or rales. Abdominal:      General: Bowel sounds are normal. There is no distension. Palpations: Abdomen is soft. Tenderness: There is no abdominal tenderness. There is no guarding or rebound. Musculoskeletal:         General: Normal range of motion. Cervical back: Normal range of motion and neck supple. Lymphadenopathy:      Cervical: No cervical adenopathy. Skin:     General: Skin is warm and dry. Coloration: Skin is not pale. Findings: No erythema or rash. Neurological:      Mental Status: She is alert. Cranial Nerves: No cranial nerve deficit. Coordination: Coordination normal.   Psychiatric:         Behavior: Behavior normal.         Thought Content: Thought content normal.         Judgment: Judgment normal.         Assessment/Plan:  1.  Annual physical exam  Will see her previous gynecologist/midwife to update her pap smear   - Jadon Garcia MD, Gastroenterology, [facility]

## 2021-06-21 NOTE — TELEPHONE ENCOUNTER
Pt called to schedule colonoscopy. Has a referral in the system to see Dr. Sandi Raza. No issues with the pt.

## 2021-06-22 LAB
ESTIMATED AVERAGE GLUCOSE: 119.8 MG/DL
HBA1C MFR BLD: 5.8 %

## 2021-06-24 ENCOUNTER — TELEPHONE (OUTPATIENT)
Dept: FAMILY MEDICINE CLINIC | Age: 56
End: 2021-06-24

## 2021-07-02 ENCOUNTER — TELEPHONE (OUTPATIENT)
Dept: FAMILY MEDICINE CLINIC | Age: 56
End: 2021-07-02

## 2021-07-02 NOTE — TELEPHONE ENCOUNTER
Incoming call from patient asking for lab results. I did call & leave a message to call back for those in detail. Informed that they were released to [facility] also. Advised that the office is closed on Monday 7/5/21.

## 2021-07-19 ENCOUNTER — OFFICE VISIT (OUTPATIENT)
Dept: ORTHOPEDIC SURGERY | Age: 56
End: 2021-07-19
Payer: COMMERCIAL

## 2021-07-19 VITALS — HEIGHT: 62 IN | WEIGHT: 293 LBS | BODY MASS INDEX: 53.92 KG/M2

## 2021-07-19 DIAGNOSIS — M67.40 MUCOID CYST OF JOINT: Primary | ICD-10-CM

## 2021-07-19 DIAGNOSIS — M79.641 RIGHT HAND PAIN: ICD-10-CM

## 2021-07-19 DIAGNOSIS — M15.1 DEGENERATIVE ARTHRITIS OF DISTAL INTERPHALANGEAL JOINT OF MIDDLE FINGER OF RIGHT HAND: ICD-10-CM

## 2021-07-19 PROCEDURE — 99203 OFFICE O/P NEW LOW 30 MIN: CPT | Performed by: ORTHOPAEDIC SURGERY

## 2021-07-19 NOTE — PROGRESS NOTES
ORTHOPAEDIC SURGERY H&P / CONSULTATION NOTE    Chief complaint:   Chief Complaint   Patient presents with    Hand Pain     RIGHT HAND PAIN        History of present illness: The patient is a 48 y.o. female right hand dominant with subjective symptoms of right middle finger swelling. The chief complaint is located at distal aspect right middle finger. Duration of symptoms has been for 6 months. The severity of symptoms is rated at 1/10 pain on intake form. Patient denies trauma. She states that she had a cyst identified by her dermatologist and was referred for orthopedic consultation. She states achiness throbbing and stiffness in the middle finger distal aspect. 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     -   Over-the-counter anti-inflammatories/prescription medication anti-inflammatory.      -   Physical therapy / guided home exercise program -     -   Previous corticosteroid injections    Past medical history:    Past Medical History:   Diagnosis Date    Depression     Hypertension         Past surgical history:    Past Surgical History:   Procedure Laterality Date    CYST REMOVAL      from head not residual problems        Allergies:  No Known Allergies      Medications:   Current Outpatient Medications:     atorvastatin (LIPITOR) 10 MG tablet, TAKE ONE TABLET BY MOUTH DAILY, Disp: 30 tablet, Rfl: 0    FLUoxetine (PROZAC) 20 MG capsule, TAKE THREE CAPSULES BY MOUTH DAILY, Disp: 270 capsule, Rfl: 0    lisinopril (PRINIVIL;ZESTRIL) 10 MG tablet, TAKE ONE TABLET BY MOUTH TWICE A DAY, Disp: 180 tablet, Rfl: 0    Naproxen Sodium (ALEVE) 220 MG CAPS, Take by mouth as needed for Pain, Disp: , Rfl:     minocycline (MINOCIN;DYNACIN) 100 MG capsule, 2 capsules daily , Disp: , Rfl:      Social history: Denies IV drug use.    Social History     Socioeconomic History    Marital status:      Spouse name: Not on file    Number of children: Not on file    Years of education: Not on file    Highest education level: Not on file   Occupational History    Not on file   Tobacco Use    Smoking status: Never Smoker    Smokeless tobacco: Never Used   Substance and Sexual Activity    Alcohol use: No    Drug use: No    Sexual activity: Yes     Partners: Male   Other Topics Concern    Not on file   Social History Narrative    Not on file     Social Determinants of Health     Financial Resource Strain:     Difficulty of Paying Living Expenses:    Food Insecurity:     Worried About Running Out of Food in the Last Year:     [address] in the Last Year:    Transportation Needs:     Lack of Transportation (Medical):  Lack of Transportation (Non-Medical):    Physical Activity:     Days of Exercise per Week:     Minutes of Exercise per Session:    Stress:     Feeling of Stress :    Social Connections:     Frequency of Communication with Friends and Family:     Frequency of Social Gatherings with Friends and Family:     Attends Rastafarian Services:     Active Member of Clubs or Organizations:     Attends Club or Organization Meetings:     Marital Status:    Intimate Partner Violence:     Fear of Current or Ex-Partner:     Emotionally Abused:     Physically Abused:     Sexually Abused: Tobacco use. Social History     Tobacco Use   Smoking Status Never Smoker   Smokeless Tobacco Never Used     Employment: Noncontributory    Workers compensation claim: Noncontributory    Review of systems: Patient denies any fevers chills chest pain shortness of breath nausea vomiting significant weight loss any change in voiding or bowel movements. Patient denies any significant numbness or tingling at baseline as it relates to this presenting symptom/chief complaint.  The patient denies any significant problems with skin or any significant allergies. Physical examination:  Body mass index is 56.7 kg/m². AAOx3, NCAT  EOMI  MMM  RR  Unlabored breathing, no wheezing  Skin intact BUE and BLE, warm and moist  Right hand: Middle finger: Skin intact. Positive mucoid cyst that DIP dorsal aspect with undulation of the nail secondary to such  Skin intact throughout  5/5 D B T G IO EPL  SILT Ax, R, U, M  +2 radial pulse    Diagnostic imaging:  MY READ:  3 view right hand 7/19/2021: Negative fracture. Positive mild arthrosis DIP middle finger    Pertinent lab work: None     Diagnosis Orders   1. Mucoid cyst of joint     2. 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 Right hand pain  XR HAND RIGHT (MIN 3 VIEWS)       Assessment and plan: 48 y.o. female with current subjective symptoms and physical exam findings with diagnostic imaging correlating to right middle finger DIP arthrosis with associated mucoid cyst.  -Greater than 50% of the time (16/30 minutes) was spent coordinating and discussing the clinical findings and diagnostic imaging results as they pertain to the patient's presenting subjective symptoms.  -I reviewed with the patient the imaging findings as well as clinical exam and  how it correlates to subjective symptoms.  -I had a pleasant discussion with the patient today. I reviewed with her currently that her clinical examination correlates to her subjective symptoms as seen also on radiographic findings. She states stiffness and pain that develops and occasionally does bother her when she bumps it against something. Otherwise she states that she has been coping well with it over the last 6 months.  -I reviewed with her the natural history and evolution with regard to a mucoid cyst.  -She is interested in having a further discussion with regard to if surgery would be of benefit to her.   Given the soft tissue envelope with relative location, I recommended referral to hand surgeon, Dr. Roque Whitmore  -A referral will be placed to him for surgical consultation and evaluation and treatment  -Continue activity modification to include low impact. I recommended OTC Tylenol Aleve per bottle as needed discomfort  -All questions answered to the patient's satisfaction and the patient expressed understanding and agreement with the above listed treatment plan  -Follow up with above listed hand surgeon  -Thank you for the clinical consultation and allowing me to participate in the patient's care. Electronically signed by Rahul Justice MD on 7/19/21 at 1:58 PM EDT    Disclaimer: This note was dictated with voice recognition software. Though review and correction are routinely performed, please contact the office/medical records for any errors requiring correction.

## 2021-07-20 ENCOUNTER — OFFICE VISIT (OUTPATIENT)
Dept: ORTHOPEDIC SURGERY | Age: 56
End: 2021-07-20
Payer: COMMERCIAL

## 2021-07-20 VITALS — BODY MASS INDEX: 53.92 KG/M2 | WEIGHT: 293 LBS | HEIGHT: 62 IN

## 2021-07-20 DIAGNOSIS — M25.551 RIGHT HIP PAIN: Primary | ICD-10-CM

## 2021-07-20 PROCEDURE — 3017F COLORECTAL CA SCREEN DOC REV: CPT | Performed by: ORTHOPAEDIC SURGERY

## 2021-07-20 PROCEDURE — 99204 OFFICE O/P NEW MOD 45 MIN: CPT | Performed by: ORTHOPAEDIC SURGERY

## 2021-07-20 PROCEDURE — 1036F TOBACCO NON-USER: CPT | Performed by: ORTHOPAEDIC SURGERY

## 2021-07-20 PROCEDURE — G8417 CALC BMI ABV UP PARAM F/U: HCPCS | Performed by: ORTHOPAEDIC SURGERY

## 2021-07-20 PROCEDURE — G8427 DOCREV CUR MEDS BY ELIG CLIN: HCPCS | Performed by: ORTHOPAEDIC SURGERY

## 2021-07-21 NOTE — PROGRESS NOTES
7/20/2021     Reason for visit:  Right hip pain    History of Present Illness: The patient is a 71-year-old female who presents for evaluation of her right hip. She reports pain for the past year with recent worsening. She now has difficulty with everyday activities including walking. She has pain at night while sleeping. She localizes the pain diffuse about the hip including anterior, deep, and within the groin. No numbness or tingling throughout the lower extremity. Medical History:  Past Medical History:   Diagnosis Date    Depression     Hypertension       Past Surgical History:   Procedure Laterality Date    CYST REMOVAL      from head not residual problems      Family History   Problem Relation Age of Onset    Breast Cancer Sister 62      Social History     Socioeconomic History    Marital status:      Spouse name: Not on file    Number of children: Not on file    Years of education: Not on file    Highest education level: Not on file   Occupational History    Not on file   Tobacco Use    Smoking status: Never Smoker    Smokeless tobacco: Never Used   Substance and Sexual Activity    Alcohol use: No    Drug use: No    Sexual activity: Yes     Partners: Male   Other Topics Concern    Not on file   Social History Narrative    Not on file     Social Determinants of Health     Financial Resource Strain:     Difficulty of Paying Living Expenses:    Food Insecurity:     Worried About Running Out of Food in the Last Year:     Ran Out of Food in the Last Year:    Transportation Needs:     Lack of Transportation (Medical):      Lack of Transportation (Non-Medical):    Physical Activity:     Days of Exercise per Week:     Minutes of Exercise per Session:    Stress:     Feeling of Stress :    Social Connections:     Frequency of Communication with Friends and Family:     Frequency of Social Gatherings with Friends and Family:     Attends Anglican Services:     Active Member of Clubs or Organizations:     Attends Club or Organization Meetings:     Marital Status:    Intimate Partner Violence:     Fear of Current or Ex-Partner:     Emotionally Abused:     Physically Abused:     Sexually Abused:       Current Outpatient Medications on File Prior to Visit   Medication Sig Dispense Refill    FLUoxetine (PROZAC) 20 MG capsule TAKE THREE CAPSULES BY MOUTH DAILY 270 capsule 0    lisinopril (PRINIVIL;ZESTRIL) 10 MG tablet TAKE ONE TABLET BY MOUTH TWICE A  tablet 0    Naproxen Sodium (ALEVE) 220 MG CAPS Take by mouth as needed for Pain      minocycline (MINOCIN;DYNACIN) 100 MG capsule 2 capsules daily        No current facility-administered medications on file prior to visit. No Known Allergies     Review of Systems:  Constitutional: Patient is adequately groomed with no evidence of malnutrition  Mental Status: The patient is oriented to time, place and person. The patient's mood and affect are appropriate. Lymphatic: The lymphatic examination bilaterally reveals all areas to be without enlargement or induration. Vascular: Examination reveals no swelling or calf tenderness. Peripheral pulses are palpable and 2+. Neurological: The patient has good coordination. There is no weakness or sensory deficit. Skin:  Head/Neck: inspection reveals no rashes, ulcerations or lesions. Trunk: inspection reveals no rashes, ulcerations or lesions.     Objective:  Ht 5' 2\" (1.575 m)   Wt (!) 310 lb (140.6 kg)   LMP 01/23/2011   BMI 56.70 kg/m²      Physical Exam:  The patient is well-appearing and in no apparent distress  Examination of the right hip  90 degrees of flexion with internal rotation to 0, external rotation of 20 degrees, pain with internal and external rotation of the hip  5 out of 5 strength throughout distal muscle groups  Sensation is intact to light touch throughout all distributions  There is no calf swelling or tenderness  Palpable DP pulse, brisk cap refill, 2+ symmetric reflexes    Imaging:  X-rays of the right hip were reviewed. As well as an AP of the pelvis. Joint space loss and osteophyte formation is present consistent with degenerative joint disease. Assessment:  Degenerative joint disease of the right hip    Plan:  I have very long discussion with the patient. Spent time reviewing the imaging findings. She certainly has advanced degenerative disease of the right hip. I did tell her that the options are quite limited. Image guided injection can be considered although at this stage of her degenerative disease likely would provide transient relief if that. I also told her that in order to be a candidate for total hip arthroplasty she would certainly need to lower her BMI. Her BMI does put her at increased risk for postoperative complications. I discussed the bariatric program with her. I will send her to one of my total joint arthroplasty partners to further discuss her condition and determine if and when she would be a candidate for arthroplasty. Greater than 45 minutes were spent with this encounter. Time spent included evaluating the patient's chart prior to arrival.  Evaluating the patient in the office including history, physical examination, imaging reviewing, and counseling on next steps. Lastly, time was spent discussing orders with my staff as well as providing documentation in the chart. Alice Ames MD            Orthopaedic Surgery Sports Medicine and [address] and [address] [facility]            Team Physician [facility] ([place]      Disclaimer: This note was dictated with voice recognition software. Though review and correction are routine, we apologize for any errors.

## 2021-07-23 ENCOUNTER — HOSPITAL ENCOUNTER (OUTPATIENT)
Dept: WOMENS IMAGING | Age: 56
Discharge: HOME | End: 2021-07-23
Payer: COMMERCIAL

## 2021-07-23 VITALS — WEIGHT: 293 LBS | HEIGHT: 62 IN | BODY MASS INDEX: 53.92 KG/M2

## 2021-07-23 DIAGNOSIS — Z00.00 ANNUAL PHYSICAL EXAM: ICD-10-CM

## 2021-07-23 DIAGNOSIS — Z12.31 ENCOUNTER FOR SCREENING MAMMOGRAM FOR MALIGNANT NEOPLASM OF BREAST: ICD-10-CM

## 2021-07-23 PROCEDURE — 77067 SCR MAMMO BI INCL CAD: CPT

## 2021-07-25 DIAGNOSIS — F33.0 MILD EPISODE OF RECURRENT MAJOR DEPRESSIVE DISORDER (HCC): ICD-10-CM

## 2021-07-26 RX ORDER — LISINOPRIL 20 MG/1
TABLET ORAL
Qty: 90 TABLET | Refills: 0 | Status: SHIPPED | OUTPATIENT
Start: 2021-07-26 | End: 2021-10-27

## 2021-07-26 RX ORDER — FLUOXETINE HYDROCHLORIDE 20 MG/1
CAPSULE ORAL
Qty: 270 CAPSULE | Refills: 0 | Status: SHIPPED | OUTPATIENT
Start: 2021-07-26 | End: 2021-10-27

## 2021-07-26 NOTE — TELEPHONE ENCOUNTER
Refill request for lisinopril prozac medication.      [facility]      Last visit - 6-21-21     Pending visit - 6-22-22    Last refill -4-21-21      Medication Contract signed -   Alvaro bateman-         Additional Comments

## 2021-07-27 ENCOUNTER — OFFICE VISIT (OUTPATIENT)
Dept: ORTHOPEDIC SURGERY | Age: 56
End: 2021-07-27
Payer: COMMERCIAL

## 2021-07-27 VITALS — HEIGHT: 62 IN | BODY MASS INDEX: 53.92 KG/M2 | WEIGHT: 293 LBS

## 2021-07-27 DIAGNOSIS — E66.01 MORBID OBESITY (HCC): ICD-10-CM

## 2021-07-27 DIAGNOSIS — M16.11 PRIMARY OSTEOARTHRITIS OF RIGHT HIP: Primary | ICD-10-CM

## 2021-07-27 PROCEDURE — 99213 OFFICE O/P EST LOW 20 MIN: CPT | Performed by: ORTHOPAEDIC SURGERY

## 2021-07-27 PROCEDURE — G8427 DOCREV CUR MEDS BY ELIG CLIN: HCPCS | Performed by: ORTHOPAEDIC SURGERY

## 2021-07-27 PROCEDURE — 3017F COLORECTAL CA SCREEN DOC REV: CPT | Performed by: ORTHOPAEDIC SURGERY

## 2021-07-27 PROCEDURE — 1036F TOBACCO NON-USER: CPT | Performed by: ORTHOPAEDIC SURGERY

## 2021-07-27 PROCEDURE — G8417 CALC BMI ABV UP PARAM F/U: HCPCS | Performed by: ORTHOPAEDIC SURGERY

## 2021-07-27 NOTE — PROGRESS NOTES
ORTHOPAEDIC SURGERY INITIAL EVALUATION NOTE  Chief Complaint   Patient presents with    Follow-up     RIGHT HIP PAIN      HISTORY OF PRESENT ILLNESS:  58-year-old morbidly obese female presents for evaluation of right hip pain. Is been going on for the better part of 6 months or a year. Its been insidious in onset without trauma. She endorses pain to the groin, lateral hip, and buttock areas. It does not radiate. She denies paresthesias. Her pain is worse with weightbearing activities including walking long distances and prolonged standing. She has difficulty with getting out of a chair. She has used occasional anti-inflammatory medicines, which do benefit her. She has recently begun her weight loss journey. She is using weight watchers on her own at this point. She is very motivated to lose the weight. Past Medical History:   Diagnosis Date    Depression     Hypertension        Current Outpatient Medications   Medication Sig Dispense Refill    lisinopril (PRINIVIL;ZESTRIL) 20 MG tablet TAKE ONE TABLET BY MOUTH DAILY 90 tablet 0    FLUoxetine (PROZAC) 20 MG capsule TAKE THREE CAPSULES BY MOUTH DAILY 270 capsule 0    atorvastatin (LIPITOR) 10 MG tablet TAKE ONE TABLET BY MOUTH DAILY 90 tablet 1    lisinopril (PRINIVIL;ZESTRIL) 10 MG tablet TAKE ONE TABLET BY MOUTH TWICE A  tablet 0    Naproxen Sodium (ALEVE) 220 MG CAPS Take by mouth as needed for Pain      minocycline (MINOCIN;DYNACIN) 100 MG capsule 2 capsules daily        No current facility-administered medications for this visit.         Past Surgical History:   Procedure Laterality Date    CYST REMOVAL      from head not residual problems       No Known Allergies    Family History   Problem Relation Age of Onset    Breast Cancer Sister 62    Breast Cancer Niece 22    Breast Cancer Paternal Aunt 72       Social History     Socioeconomic History    Marital status:      Spouse name: Not on file    Number of children: Not on file    Years of education: Not on file    Highest education level: Not on file   Occupational History    Not on file   Tobacco Use    Smoking status: Never Smoker    Smokeless tobacco: Never Used   Substance and Sexual Activity    Alcohol use: No    Drug use: No    Sexual activity: Yes     Partners: Male   Other Topics Concern    Not on file   Social History Narrative    Not on file     Social Determinants of Health     Financial Resource Strain:     Difficulty of Paying Living Expenses:    Food Insecurity:     Worried About Running Out of Food in the Last Year:     [address] in the Last Year:    Transportation Needs:     Lack of Transportation (Medical):  Lack of Transportation (Non-Medical):    Physical Activity:     Days of Exercise per Week:     Minutes of Exercise per Session:    Stress:     Feeling of Stress :    Social Connections:     Frequency of Communication with Friends and Family:     Frequency of Social Gatherings with Friends and Family:     Attends Sikh Services:     Active Member of Clubs or Organizations:     Attends Club or Organization Meetings:     Marital Status:    Intimate Partner Violence:     Fear of Current or Ex-Partner:     Emotionally Abused:     Physically Abused:     Sexually Abused:      Review of Systems   Constitutional: Negative for chills, fever and unexpected weight change. Cardiovascular: Positive for leg swelling. Musculoskeletal: Positive for arthralgias, gait problem and joint swelling. Skin: Negative for pallor, rash and wound. Neurological: Negative for weakness and numbness. PHYSICAL EXAM  BMI 56.7  Gen: awake, alert, oriented  HEENT: atraumatic, normocephalic  Neck: supple  Resp: equal chest rise bilaterally, no audible wheezes, no accessory muscle use. CV: Lower extremities warm and well perfused.   Abd: soft, nontender   Focused examination of the right lower extremity:  There are no cuts, open wounds, or arthroplasty. She understands this with her prior consultations with other orthopedic surgeons. She has begun weight watchers and attempt to lower her BMI and make herself a better surgical candidate. I calculated that she would need to be in the range of 225 pounds prior to consideration. I discussed the reason for a BMI cutoff including but not limited to exponential increase in complications such as infection, wound healing problems, longevity of a prosthetic, intraoperative fracture, DVT. She expressed understanding of this. I discussed risks, benefits, and alternatives to surgical intervention.   She will return on an as-needed basis in the future when she is nearing her BMI goal.    Neftali Glez MD

## 2021-09-08 ENCOUNTER — TELEPHONE (OUTPATIENT)
Dept: FAMILY MEDICINE CLINIC | Age: 56
End: 2021-09-08

## 2021-09-08 NOTE — TELEPHONE ENCOUNTER
Pt called and complains of itchy, \"flesh colored rash, looks like tapioca pudding\" on right hand. States she fractured her right hand about a month ago and has been out of the splint for about a week. 3-4 days ago the rash appeared. Requesting an appointment.  Appointment made

## 2021-09-09 ENCOUNTER — OFFICE VISIT (OUTPATIENT)
Dept: FAMILY MEDICINE CLINIC | Age: 56
End: 2021-09-09
Payer: COMMERCIAL

## 2021-09-09 VITALS
BODY MASS INDEX: 53.33 KG/M2 | WEIGHT: 291.6 LBS | SYSTOLIC BLOOD PRESSURE: 134 MMHG | OXYGEN SATURATION: 98 % | DIASTOLIC BLOOD PRESSURE: 78 MMHG | HEART RATE: 71 BPM

## 2021-09-09 DIAGNOSIS — L23.89 ALLERGIC CONTACT DERMATITIS DUE TO OTHER AGENTS: Primary | ICD-10-CM

## 2021-09-09 PROCEDURE — G8427 DOCREV CUR MEDS BY ELIG CLIN: HCPCS | Performed by: NURSE PRACTITIONER

## 2021-09-09 PROCEDURE — 99213 OFFICE O/P EST LOW 20 MIN: CPT | Performed by: NURSE PRACTITIONER

## 2021-09-09 PROCEDURE — G8417 CALC BMI ABV UP PARAM F/U: HCPCS | Performed by: NURSE PRACTITIONER

## 2021-09-09 PROCEDURE — 3017F COLORECTAL CA SCREEN DOC REV: CPT | Performed by: NURSE PRACTITIONER

## 2021-09-09 PROCEDURE — 1036F TOBACCO NON-USER: CPT | Performed by: NURSE PRACTITIONER

## 2021-09-09 RX ORDER — METHYLPREDNISOLONE 4 MG/1
TABLET ORAL
Qty: 1 KIT | Refills: 0 | Status: SHIPPED | OUTPATIENT
Start: 2021-09-09 | End: 2021-09-15

## 2021-09-09 ASSESSMENT — ENCOUNTER SYMPTOMS: COLOR CHANGE: 0

## 2021-09-09 NOTE — PATIENT INSTRUCTIONS
Patient Education        Dermatitis: Care Instructions  Your Care Instructions  Dermatitis is the general name used for any rash or inflammation of the skin. Different kinds of dermatitis cause different kinds of rashes. Common causes of a rash include new medicines, plants (such as poison oak or poison ivy), heat, and stress. Certain illnesses can also cause a rash. An allergic reaction to something that touches your skin, such as latex, nickel, or poison ivy, is called contact dermatitis. Contact dermatitis may also be caused by something that irritates the skin, such as bleach, a chemical, or soap. These types of rashes cannot be spread from person to person. How long your rash will last depends on what caused it. Rashes may last a few days or months. Follow-up care is a key part of your treatment and safety. Be sure to make and go to all appointments, and call your doctor if you are having problems. It's also a good idea to know your test results and keep a list of the medicines you take. How can you care for yourself at home? · Do not scratch the rash. Cut your nails short, and file them smooth. Or wear gloves if this helps keep you from scratching. · Wash the area with water only. Pat dry. · Put cold, wet cloths on the rash to reduce itching. · Keep cool, and stay out of the sun. · Leave the rash open to the air as much as possible. · If the rash itches, use hydrocortisone cream. Follow the directions on the label. Calamine lotion may help for plant rashes. · Take an over-the-counter antihistamine, such as diphenhydramine (Benadryl) or loratadine (Claritin), to help calm the itching. Read and follow all instructions on the label. · If your doctor prescribed a cream, use it as directed. If your doctor prescribed medicine, take it exactly as directed. When should you call for help?    Call your doctor now or seek immediate medical care if:    · You have symptoms of infection, such as:  ? Increased pain, swelling, warmth, or redness. ? Red streaks leading from the area. ? Pus draining from the area. ? A fever.     · You have joint pain along with the rash. Watch closely for changes in your health, and be sure to contact your doctor if:    · Your rash is changing or getting worse.     · You are not getting better as expected. Where can you learn more? Go to https://Fire[employer] Systems.Netac. org and sign in to your [employer] account. Enter [phone] in the [facility] box to learn more about \"Dermatitis: Care Instructions. \"     If you do not have an account, please click on the \"Sign Up Now\" link. Current as of: March 3, 2021               Content Version: 12.9  © 8298-6543 Healthwise, Incorporated. Care instructions adapted under license by [facility] ([facility]). If you have questions about a medical condition or this instruction, always ask your healthcare professional. [place] [address] any warranty or liability for your use of this information.

## 2022-01-10 ENCOUNTER — PATIENT MESSAGE (OUTPATIENT)
Dept: ORTHOPEDIC SURGERY | Age: 57
End: 2022-01-10

## 2022-05-04 RX ORDER — ATORVASTATIN CALCIUM 10 MG/1
TABLET, FILM COATED ORAL
Qty: 90 TABLET | Refills: 1 | Status: SHIPPED | OUTPATIENT
Start: 2022-05-04

## 2022-05-04 NOTE — TELEPHONE ENCOUNTER
Refill Request     Last Seen: Last Seen Department: 9/9/2021  Last Seen by PCP: 6/21/2021    Last Written: 07/21/2021 90 Tablet 1 Refill    Next Appointment:   Future Appointments   Date Time Provider Torres Sandra   6/22/2022 11:00 AM MD KAMI Hernadez  [place] [facility]       Future appointment scheduled      Requested Prescriptions     Pending Prescriptions Disp Refills    atorvastatin (LIPITOR) 10 MG tablet [Pharmacy Med Name: ATORVASTATIN 10 MG TABLET] 30 tablet      Sig: TAKE ONE TABLET BY MOUTH DAILY

## 2022-05-30 NOTE — TELEPHONE ENCOUNTER
.  Refill Request     CONFIRM preferrred pharmacy with the patient. If Mail Order Rx - Pend for 90 day refill.       Last Seen: Last Seen Department: 9/9/2021  Last Seen by PCP: 6/21/2021    Last Written: 10/27/21 90 with 1     Next Appointment:   Future Appointments   Date Time Provider Torres Flores   6/22/2022 11:00 AM MD KAMI Perez  [place] [facility]           Requested Prescriptions     Pending Prescriptions Disp Refills    lisinopril (PRINIVIL;ZESTRIL) 20 MG tablet [Pharmacy Med Name: LISINOPRIL 20 MG TABLET] 90 tablet 1     Sig: TAKE ONE TABLET BY MOUTH DAILY

## 2022-05-31 RX ORDER — LISINOPRIL 20 MG/1
TABLET ORAL
Qty: 90 TABLET | Refills: 1 | Status: SHIPPED | OUTPATIENT
Start: 2022-05-31

## 2022-06-22 ENCOUNTER — OFFICE VISIT (OUTPATIENT)
Dept: FAMILY MEDICINE CLINIC | Age: 57
End: 2022-06-22
Payer: COMMERCIAL

## 2022-06-22 VITALS
WEIGHT: 234.8 LBS | DIASTOLIC BLOOD PRESSURE: 70 MMHG | SYSTOLIC BLOOD PRESSURE: 122 MMHG | HEART RATE: 78 BPM | OXYGEN SATURATION: 98 % | BODY MASS INDEX: 42.95 KG/M2

## 2022-06-22 DIAGNOSIS — Z00.00 ENCOUNTER FOR WELL ADULT EXAM WITHOUT ABNORMAL FINDINGS: ICD-10-CM

## 2022-06-22 DIAGNOSIS — E78.2 MIXED HYPERLIPIDEMIA: ICD-10-CM

## 2022-06-22 DIAGNOSIS — E55.9 VITAMIN D DEFICIENCY: ICD-10-CM

## 2022-06-22 DIAGNOSIS — Z12.4 CERVICAL CANCER SCREENING: ICD-10-CM

## 2022-06-22 DIAGNOSIS — Z78.0 POSTMENOPAUSAL: ICD-10-CM

## 2022-06-22 DIAGNOSIS — Z11.59 ENCOUNTER FOR HEPATITIS C SCREENING TEST FOR LOW RISK PATIENT: ICD-10-CM

## 2022-06-22 DIAGNOSIS — I10 PRIMARY HYPERTENSION: ICD-10-CM

## 2022-06-22 DIAGNOSIS — Z11.4 SCREENING FOR HIV (HUMAN IMMUNODEFICIENCY VIRUS): ICD-10-CM

## 2022-06-22 DIAGNOSIS — Z00.00 ENCOUNTER FOR WELL ADULT EXAM WITHOUT ABNORMAL FINDINGS: Primary | ICD-10-CM

## 2022-06-22 DIAGNOSIS — Z12.11 COLON CANCER SCREENING: ICD-10-CM

## 2022-06-22 PROBLEM — S62.316A CLOSED DISPLACED FRACTURE OF BASE OF FIFTH METACARPAL BONE OF RIGHT HAND: Status: ACTIVE | Noted: 2021-07-28

## 2022-06-22 PROBLEM — M67.449 MUCOUS CYST OF FINGER: Status: ACTIVE | Noted: 2021-07-21

## 2022-06-22 LAB
A/G RATIO: 1.8 (ref 1.1–2.2)
ALBUMIN SERPL-MCNC: 4.7 G/DL (ref 3.4–5)
ALP BLD-CCNC: 111 U/L (ref 40–129)
ALT SERPL-CCNC: 17 U/L (ref 10–40)
ANION GAP SERPL CALCULATED.3IONS-SCNC: 13 MMOL/L (ref 3–16)
AST SERPL-CCNC: 16 U/L (ref 15–37)
BASOPHILS ABSOLUTE: 0 K/UL (ref 0–0.2)
BASOPHILS RELATIVE PERCENT: 0.5 %
BILIRUB SERPL-MCNC: 0.4 MG/DL (ref 0–1)
BUN BLDV-MCNC: 18 MG/DL (ref 7–20)
CALCIUM SERPL-MCNC: 10.2 MG/DL (ref 8.3–10.6)
CHLORIDE BLD-SCNC: 102 MMOL/L (ref 99–110)
CHOLESTEROL, TOTAL: 151 MG/DL (ref 0–199)
CO2: 25 MMOL/L (ref 21–32)
CREAT SERPL-MCNC: 0.7 MG/DL (ref 0.6–1.1)
EOSINOPHILS ABSOLUTE: 0 K/UL (ref 0–0.6)
EOSINOPHILS RELATIVE PERCENT: 0.1 %
GFR AFRICAN AMERICAN: >60
GFR NON-AFRICAN AMERICAN: >60
GLUCOSE BLD-MCNC: 83 MG/DL (ref 70–99)
HCT VFR BLD CALC: 40.4 % (ref 36–48)
HDLC SERPL-MCNC: 50 MG/DL (ref 40–60)
HEMOGLOBIN: 13.5 G/DL (ref 12–16)
HEPATITIS C ANTIBODY INTERPRETATION: NORMAL
LDL CHOLESTEROL CALCULATED: 87 MG/DL
LYMPHOCYTES ABSOLUTE: 1.6 K/UL (ref 1–5.1)
LYMPHOCYTES RELATIVE PERCENT: 20 %
MCH RBC QN AUTO: 29.3 PG (ref 26–34)
MCHC RBC AUTO-ENTMCNC: 33.5 G/DL (ref 31–36)
MCV RBC AUTO: 87.5 FL (ref 80–100)
MONOCYTES ABSOLUTE: 0.5 K/UL (ref 0–1.3)
MONOCYTES RELATIVE PERCENT: 6.4 %
NEUTROPHILS ABSOLUTE: 5.8 K/UL (ref 1.7–7.7)
NEUTROPHILS RELATIVE PERCENT: 73 %
PDW BLD-RTO: 14 % (ref 12.4–15.4)
PLATELET # BLD: 301 K/UL (ref 135–450)
PMV BLD AUTO: 8.1 FL (ref 5–10.5)
POTASSIUM SERPL-SCNC: 5.2 MMOL/L (ref 3.5–5.1)
RBC # BLD: 4.62 M/UL (ref 4–5.2)
SODIUM BLD-SCNC: 140 MMOL/L (ref 136–145)
TOTAL PROTEIN: 7.3 G/DL (ref 6.4–8.2)
TRIGL SERPL-MCNC: 72 MG/DL (ref 0–150)
TSH REFLEX: 2.47 UIU/ML (ref 0.27–4.2)
VITAMIN D 25-HYDROXY: 39.1 NG/ML
VLDLC SERPL CALC-MCNC: 14 MG/DL
WBC # BLD: 8 K/UL (ref 4–11)

## 2022-06-22 PROCEDURE — 99396 PREV VISIT EST AGE 40-64: CPT | Performed by: FAMILY MEDICINE

## 2022-06-22 RX ORDER — MULTIVIT-MIN/IRON/FOLIC ACID/K 18-600-40
2000 CAPSULE ORAL DAILY
COMMUNITY

## 2022-06-22 ASSESSMENT — PATIENT HEALTH QUESTIONNAIRE - PHQ9
3. TROUBLE FALLING OR STAYING ASLEEP: 0
SUM OF ALL RESPONSES TO PHQ9 QUESTIONS 1 & 2: 0
10. IF YOU CHECKED OFF ANY PROBLEMS, HOW DIFFICULT HAVE THESE PROBLEMS MADE IT FOR YOU TO DO YOUR WORK, TAKE CARE OF THINGS AT HOME, OR GET ALONG WITH OTHER PEOPLE: 0
1. LITTLE INTEREST OR PLEASURE IN DOING THINGS: 0
9. THOUGHTS THAT YOU WOULD BE BETTER OFF DEAD, OR OF HURTING YOURSELF: 0
SUM OF ALL RESPONSES TO PHQ QUESTIONS 1-9: 0
4. FEELING TIRED OR HAVING LITTLE ENERGY: 0
7. TROUBLE CONCENTRATING ON THINGS, SUCH AS READING THE NEWSPAPER OR WATCHING TELEVISION: 0
6. FEELING BAD ABOUT YOURSELF - OR THAT YOU ARE A FAILURE OR HAVE LET YOURSELF OR YOUR FAMILY DOWN: 0
SUM OF ALL RESPONSES TO PHQ QUESTIONS 1-9: 0
8. MOVING OR SPEAKING SO SLOWLY THAT OTHER PEOPLE COULD HAVE NOTICED. OR THE OPPOSITE, BEING SO FIGETY OR RESTLESS THAT YOU HAVE BEEN MOVING AROUND A LOT MORE THAN USUAL: 0
2. FEELING DOWN, DEPRESSED OR HOPELESS: 0
5. POOR APPETITE OR OVEREATING: 0

## 2022-06-22 ASSESSMENT — ENCOUNTER SYMPTOMS
BLOOD IN STOOL: 0
DIARRHEA: 0
VOMITING: 0
COUGH: 0
COLOR CHANGE: 0
TROUBLE SWALLOWING: 0
CONSTIPATION: 0
ABDOMINAL PAIN: 0
NAUSEA: 0
SHORTNESS OF BREATH: 0
BACK PAIN: 0

## 2022-06-22 NOTE — PATIENT INSTRUCTIONS
Well Visit, Women 48 to 72: Care Instructions  Overview     Well visits can help you stay healthy. Your doctor has checked your overall health and may have suggested ways to take good care of yourself. Your doctor also may have recommended tests. At home, you can help prevent illness withhealthy eating, regular exercise, and other steps. Follow-up care is a key part of your treatment and safety. Be sure to make and go to all appointments, and call your doctor if you are having problems. It's also a good idea to know your test results and keep alist of the medicines you take. How can you care for yourself at home?  Get screening tests that you and your doctor decide on. Screening helps find diseases before any symptoms appear.  Eat healthy foods. Choose fruits, vegetables, whole grains, protein, and low-fat dairy foods. Limit fat, especially saturated fat. Reduce salt in your diet.  Limit alcohol. Have no more than 1 drink a day or 7 drinks a week.  Get at least 30 minutes of exercise on most days of the week. Walking is a good choice. You also may want to do other activities, such as running, swimming, cycling, or playing tennis or team sports.  Reach and stay at a healthy weight. This will lower your risk for many problems, such as obesity, diabetes, heart disease, and high blood pressure.  Do not smoke. Smoking can make health problems worse. If you need help quitting, talk to your doctor about stop-smoking programs and medicines. These can increase your chances of quitting for good.  Care for your mental health. It is easy to get weighed down by worry and stress. Learn strategies to manage stress, like deep breathing and mindfulness, and stay connected with your family and community. If you find you often feel sad or hopeless, talk with your doctor. Treatment can help.  Talk to your doctor about whether you have any risk factors for sexually transmitted infections (STIs).  You can help prevent STIs if you wait to have sex with a new partner (or partners) until you've each been tested for STIs. It also helps if you use condoms (male or female condoms) and if you limit your sex partners to one person who only has sex with you. Vaccines are available for some STIs.  If you think you may have a problem with alcohol or drug use, talk to your doctor. This includes prescription medicines (such as amphetamines and opioids) and illegal drugs (such as cocaine and methamphetamine). Your doctor can help you figure out what type of treatment is best for you.  Protect your skin from too much sun. When you're outdoors from 10 a.m. to 4 p.m., stay in the shade or cover up with clothing and a hat with a wide brim. Wear sunglasses that block UV rays. Even when it's cloudy, put broad-spectrum sunscreen (SPF 30 or higher) on any exposed skin.  See a dentist one or two times a year for checkups and to have your teeth cleaned.  Wear a seat belt in the car. When should you call for help? Watch closely for changes in your health, and be sure to contact your doctor if you have any problems or symptoms that concern you. Where can you learn more? Go to https://Leanplumpealessandraeweb.[email or web address]. org and sign in to your FullContact account. Enter B653 in the [employer] box to learn more about \"Well Visit, Women 50 to 72: Care Instructions. \"     If you do not have an account, please click on the \"Sign Up Now\" link. Current as of: October 6, 2021               Content Version: 13.3  © 2006-2022 Healthwise, Incorporated. Care instructions adapted under license by [facility] ([facility]). If you have questions about a medical condition or this instruction, always ask your healthcare professional. Ettie Junes any warranty or liability for your use of this information.

## 2022-06-22 NOTE — PROGRESS NOTES
Well Adult Note  Name: Heather Giordano Date: 2022   MRN: [id number] Sex: Female   Age: 55 y.o. Ethnicity: Non- / Non    : 1965 Race: White (non-)      Lizzie Max is here for well adult exam.  History:    Patient's last menstrual period was 2011. menstrual cycle: n/a  Follows with gynecology? yes - Dr. Shirley ray    Last Mammogram: yes - , reassuring   Family Hx of ovarian, breast, or uterine cancer: no  Self-breast exams: yes - no concerns     Previous DEXA scan: n/a    Previous Colonoscopy no  BRBR, unexplained WL, bloating, abd pain No  Family Hx of Colon Ca  No    +intentional WL  Last  started [employer]  Has been faithful    Review of Systems   Constitutional: Negative for activity change, appetite change, chills, diaphoresis, fatigue, fever and unexpected weight change. HENT: Negative for ear pain, hearing loss and trouble swallowing. Eyes: Negative for visual disturbance. Respiratory: Negative for cough and shortness of breath. Cardiovascular: Negative for chest pain, palpitations and leg swelling. Gastrointestinal: Negative for abdominal pain, blood in stool, constipation, diarrhea, nausea and vomiting. Genitourinary: Negative for decreased urine volume, difficulty urinating, dysuria, flank pain, hematuria and urgency. Musculoskeletal: Negative for arthralgias and back pain. Skin: Negative for color change and rash. Neurological: Negative for dizziness, weakness, light-headedness, numbness and headaches. Psychiatric/Behavioral: Negative for dysphoric mood and sleep disturbance. The patient is not nervous/anxious. No Known Allergies      Prior to Visit Medications    Medication Sig Taking?  Authorizing Provider   Cholecalciferol (VITAMIN D) 50 MCG ( UT) CAPS capsule Take 2,000 Units by mouth daily Yes Historical Provider, MD   lisinopril (PRINIVIL;ZESTRIL) 20 MG tablet TAKE ONE TABLET BY MOUTH DAILY Yes NAVIN Pittman   atorvastatin (LIPITOR) 10 MG tablet TAKE ONE TABLET BY MOUTH DAILY Yes Robert Blanco MD   FLUoxetine (PROZAC) 20 MG capsule TAKE THREE CAPSULES BY MOUTH DAILY Yes Robert Blanco MD   Naproxen Sodium (ALEVE) 220 MG CAPS Take by mouth as needed for Pain Yes Historical Provider, MD   minocycline (MINOCIN;DYNACIN) 100 MG capsule 2 capsules daily  Yes Historical Provider, MD         Past Medical History:   Diagnosis Date    Depression     Hypertension        Past Surgical History:   Procedure Laterality Date    CYST REMOVAL      from head not residual problems         Family History   Problem Relation Age of Onset    Breast Cancer Sister 62    Breast Cancer Niece 22    Breast Cancer Paternal Aunt 72       Social History     Tobacco Use    Smoking status: Never Smoker    Smokeless tobacco: Never Used   Substance Use Topics    Alcohol use: No    Drug use: No       Objective   /70 (Site: Right Upper Arm, Position: Sitting, Cuff Size: Large Adult)   Pulse 78   Wt 234 lb 12.8 oz (106.5 kg)   LMP 01/23/2011   SpO2 98%   BMI 42.95 kg/m²   Wt Readings from Last 3 Encounters:   06/22/22 234 lb 12.8 oz (106.5 kg)   09/09/21 291 lb 9.6 oz (132.3 kg)   07/27/21 (!) 310 lb (140.6 kg)     There were no vitals filed for this visit. Physical Exam  Vitals and nursing note reviewed. Constitutional:       General: She is not in acute distress. Appearance: She is well-developed. She is not diaphoretic. HENT:      Head: Normocephalic and atraumatic. Right Ear: External ear normal.      Left Ear: External ear normal.      Mouth/Throat:      Pharynx: No oropharyngeal exudate. Eyes:      General:         Right eye: No discharge. Left eye: No discharge. Conjunctiva/sclera: Conjunctivae normal.      Pupils: Pupils are equal, round, and reactive to light. Neck:      Thyroid: No thyromegaly. Cardiovascular:      Rate and Rhythm: Normal rate and regular rhythm. cancer screening  -     [facility], , Gynecology, [address] her on a 68 lb WL! Personalized Preventive Plan   Current Health Maintenance Status  Immunization History   Administered Date(s) Administered    COVID-19, Pfizer Purple top, DILUTE for use, 12+ yrs, 30mcg/0.3mL dose 03/29/2021, 04/19/2021, 01/04/2022    Influenza A (N2Q1-26) Vaccine Nasal 12/15/2009    Tdap (Boostrix, Adacel) 01/21/2020    Zoster Recombinant (Shingrix) 01/21/2020        Health Maintenance   Topic Date Due    Depression Monitoring  Never done    HIV screen  Never done    Hepatitis C screen  Never done    Cervical cancer screen  Never done    Colorectal Cancer Screen  Never done    Shingles vaccine (2 of 2) 03/17/2020    A1C test (Diabetic or Prediabetic)  06/21/2022    Lipids  06/21/2022    Breast cancer screen  07/23/2022    Flu vaccine (Season Ended) 09/01/2022    DTaP/Tdap/Td vaccine (2 - Td or Tdap) 01/21/2030    COVID-19 Vaccine  Completed    Hepatitis A vaccine  Aged Out    Hepatitis B vaccine  Aged Out    Hib vaccine  Aged Out    Meningococcal (ACWY) vaccine  Aged Out    Pneumococcal 0-64 years Vaccine  Aged Out     Recommendations for [employer] Due: see orders and patient instructions/AVS.    Return if symptoms worsen or fail to improve.

## 2022-06-23 LAB
ESTIMATED AVERAGE GLUCOSE: 102.5 MG/DL
HBA1C MFR BLD: 5.2 %
HIV AG/AB: NORMAL
HIV ANTIGEN: NORMAL
HIV-1 ANTIBODY: NORMAL
HIV-2 AB: NORMAL

## 2022-07-03 DIAGNOSIS — F33.0 MILD EPISODE OF RECURRENT MAJOR DEPRESSIVE DISORDER (HCC): ICD-10-CM

## 2022-07-03 NOTE — TELEPHONE ENCOUNTER
.  Refill Request     CONFIRM preferrred pharmacy with the patient. If Mail Order Rx - Pend for 90 day refill.       Last Seen: Last Seen Department: 6/22/2022  Last Seen by PCP: 6/22/2022    Last Written: 10/27/21 270 with 1     Next Appointment:   Future Appointments   Date Time Provider Torres Flores   6/22/2023  1:15 PM MD KAMI Messina  [place] [facility]         Requested Prescriptions     Pending Prescriptions Disp Refills    FLUoxetine (PROZAC) 20 MG capsule [Pharmacy Med Name: FLUoxetine HCL 20 MG CAPSULE] 270 capsule 1     Sig: TAKE THREE CAPSULES BY MOUTH DAILY

## 2022-07-05 RX ORDER — FLUOXETINE HYDROCHLORIDE 20 MG/1
CAPSULE ORAL
Qty: 270 CAPSULE | Refills: 1 | Status: SHIPPED | OUTPATIENT
Start: 2022-07-05

## 2022-07-15 ENCOUNTER — TELEMEDICINE (OUTPATIENT)
Dept: PRIMARY CARE CLINIC | Age: 57
End: 2022-07-15
Payer: COMMERCIAL

## 2022-07-15 DIAGNOSIS — U07.1 COVID: Primary | ICD-10-CM

## 2022-07-15 PROCEDURE — 99422 OL DIG E/M SVC 11-20 MIN: CPT | Performed by: NURSE PRACTITIONER

## 2022-07-15 ASSESSMENT — ENCOUNTER SYMPTOMS
COUGH: 1
CHEST TIGHTNESS: 1
RHINORRHEA: 1
SINUS PAIN: 1
SINUS PRESSURE: 1

## 2022-07-15 NOTE — PROGRESS NOTES
[facility]  1965[facility] is a Established patient, here for evaluation of the following:    ASSESSMENT/PLAN:  Below is the assessment and plan developed based on review of pertinent history, physical exam, labs, studies, and medications. 1. COVID  Viral URI/ Bronchitis Symptom Management with over the counter treatments:  Symptoms may last up to 14 days but should improve significantly by day 7    Sore Throat:  Ice chips and warm drinks, throat lozenges, Ibuprofen as directed for dosing  Throat Coat Tea (box of bags in organic aisle at [employer])    Sinus Congestion:  May last up to 14 days  Saline Nasal Spray  Queenie Pot Rinses with Saline  Nasal Steroid Spray (Nasonex, Flonase, Rhinocort- all OTC) after sinus rinsing twice daily  Sudafed 120mg twice daily if not hypertensive  Coricidan HBP or mucinex if hypertensive    Runny Nose:  Afrin nasal spray 3-5 days max  Queenie Pot irrigation  Steroid Nasal Spray  Benadryl at bedtime  OTC antihistamine non drowsy- Allegra, Zyrtec, Claritin    Cough: May last up to 6 weeks  Cough suppressants- Delsym, \"DM\" containing products  Throat lozenges  Guaifenesin (Mucinex) for thick secretions, dink plenty of fluids with this    Call the office for:  Fever over 101  Symptoms worsen or fail to improve with OTC medications after 10 days  You have bloody phlegm or bloody sinus drainage  Change or worsening of symptoms    -     nirmatrelvir/ritonavir (PAXLOVID) 20 x 150 MG & 10 x 100MG; Take 3 tablets (two 150 mg nirmatrelvir and one 100 mg ritonavir tablets) by mouth every 12 hours for 5 days. , Disp-30 tablet, R-0Normal  No follow-ups on file. SUBJECTIVE/OBJECTIVE:  She returned from vacation yesterday. She was fatigued, went to bed with chills, fever, aches. She has runny nose, cough and headache. She tested positive last night. She has increased fluids and taken aleve. Requested paxlovid.  Reviewed medication list and she was advised to stop atorvastatin while taking paxlovid. Home treatments recommended listed above. Review of Systems   Constitutional:  Positive for chills and fatigue. HENT:  Positive for congestion, postnasal drip, rhinorrhea, sinus pressure and sinus pain. Respiratory:  Positive for cough and chest tightness. Productive cough   Musculoskeletal:  Positive for myalgias. No flowsheet data found. Physical Exam    [INSTRUCTIONS:  \"[x]\" Indicates a positive item  \"[]\" Indicates a negative item  -- DELETE ALL ITEMS NOT EXAMINED]    Constitutional: [x] Appears well-developed and well-nourished [x] No apparent distress      [] Abnormal -     Mental status: [x] Alert and awake  [x] Oriented to person/place/time [x] Able to follow commands    [] Abnormal -     Eyes:   EOM    [x]  Normal    [] Abnormal -   Sclera  [x]  Normal    [] Abnormal -          Discharge [x]  None visible   [] Abnormal -     HENT: [x] Normocephalic, atraumatic  [] Abnormal -   [x] Mouth/Throat: Mucous membranes are moist    External Ears [x] Normal  [] Abnormal -    Neck: [x] No visualized mass [] Abnormal -     Pulmonary/Chest: [x] Respiratory effort normal   [x] No visualized signs of difficulty breathing or respiratory distress        [] Abnormal -      Musculoskeletal:   [x] Normal gait with no signs of ataxia         [x] Normal range of motion of neck        [] Abnormal -     Neurological:        [x] No Facial Asymmetry (Cranial nerve 7 motor function) (limited exam due to video visit)          [x] No gaze palsy        [] Abnormal -          Skin:        [x] No significant exanthematous lesions or discoloration noted on facial skin         [] Abnormal -            Psychiatric:       [x] Normal Affect [] Abnormal -            Other pertinent observable physical exam findings:-  Patient sounds congested.       On this date 7/15/2022 I have spent 15 minutes reviewing previous notes, test results and face to face (virtual) with the patient discussing the diagnosis and importance of compliance with the treatment plan as well as documenting on the day of the visit. [facility], was evaluated through a synchronous (real-time) audio-video encounter. The patient (or guardian if applicable) is aware that this is a billable service, which includes applicable co-pays. This Virtual Visit was conducted with patient's (and/or legal guardian's) consent. The visit was conducted pursuant to the emergency declaration under the [address] [place] [address] authority and the [employer] and [employer] General Act. Patient identification was verified, and a caregiver was present when appropriate. The patient was located at home in a state where the provider was licensed to provide care.     --NAVIN Castano

## 2022-07-17 ENCOUNTER — PATIENT MESSAGE (OUTPATIENT)
Dept: ORTHOPEDIC SURGERY | Age: 57
End: 2022-07-17

## 2022-07-18 NOTE — TELEPHONE ENCOUNTER
From: Christiane Potts  To: Dr. Nu Herman: 7/17/2022 9:09 PM EDT  Subject: Weight Loss Update    Dear Dr. Rakesh Menendez:  Today is Sunday, July 17, 2022. I have now lost 83 pounds. This is a loss of six pounds from last month. I will keep you posted on my progress. Thanks!    Luz Marina Fitzpatrick

## 2022-11-11 ENCOUNTER — HOSPITAL ENCOUNTER (OUTPATIENT)
Age: 57
Discharge: HOME | End: 2022-11-11
Payer: COMMERCIAL

## 2022-11-11 ENCOUNTER — OFFICE VISIT (OUTPATIENT)
Dept: FAMILY MEDICINE CLINIC | Age: 57
End: 2022-11-11
Payer: COMMERCIAL

## 2022-11-11 ENCOUNTER — HOSPITAL ENCOUNTER (OUTPATIENT)
Dept: GENERAL RADIOLOGY | Age: 57
Discharge: HOME | End: 2022-11-11
Payer: COMMERCIAL

## 2022-11-11 VITALS
BODY MASS INDEX: 39.56 KG/M2 | HEIGHT: 62 IN | DIASTOLIC BLOOD PRESSURE: 78 MMHG | HEART RATE: 84 BPM | OXYGEN SATURATION: 98 % | SYSTOLIC BLOOD PRESSURE: 114 MMHG | TEMPERATURE: 98.4 F | WEIGHT: 215 LBS

## 2022-11-11 DIAGNOSIS — M54.50 CHRONIC RIGHT-SIDED LOW BACK PAIN, UNSPECIFIED WHETHER SCIATICA PRESENT: ICD-10-CM

## 2022-11-11 DIAGNOSIS — M54.50 ACUTE LEFT-SIDED LOW BACK PAIN, UNSPECIFIED WHETHER SCIATICA PRESENT: ICD-10-CM

## 2022-11-11 DIAGNOSIS — G89.29 CHRONIC RIGHT-SIDED LOW BACK PAIN, UNSPECIFIED WHETHER SCIATICA PRESENT: ICD-10-CM

## 2022-11-11 DIAGNOSIS — M54.9 TENDERNESS OVER SPINE: Primary | ICD-10-CM

## 2022-11-11 DIAGNOSIS — M54.9 TENDERNESS OVER SPINE: ICD-10-CM

## 2022-11-11 PROCEDURE — 3017F COLORECTAL CA SCREEN DOC REV: CPT | Performed by: FAMILY MEDICINE

## 2022-11-11 PROCEDURE — G8484 FLU IMMUNIZE NO ADMIN: HCPCS | Performed by: FAMILY MEDICINE

## 2022-11-11 PROCEDURE — G8427 DOCREV CUR MEDS BY ELIG CLIN: HCPCS | Performed by: FAMILY MEDICINE

## 2022-11-11 PROCEDURE — 3074F SYST BP LT 130 MM HG: CPT | Performed by: FAMILY MEDICINE

## 2022-11-11 PROCEDURE — G8417 CALC BMI ABV UP PARAM F/U: HCPCS | Performed by: FAMILY MEDICINE

## 2022-11-11 PROCEDURE — 1036F TOBACCO NON-USER: CPT | Performed by: FAMILY MEDICINE

## 2022-11-11 PROCEDURE — 3078F DIAST BP <80 MM HG: CPT | Performed by: FAMILY MEDICINE

## 2022-11-11 PROCEDURE — 99214 OFFICE O/P EST MOD 30 MIN: CPT | Performed by: FAMILY MEDICINE

## 2022-11-11 PROCEDURE — 72100 X-RAY EXAM L-S SPINE 2/3 VWS: CPT

## 2022-11-11 RX ORDER — METHYLPREDNISOLONE 4 MG/1
TABLET ORAL
Qty: 1 KIT | Refills: 0 | Status: SHIPPED | OUTPATIENT
Start: 2022-11-11 | End: 2022-11-17

## 2022-11-11 RX ORDER — METHOCARBAMOL 500 MG/1
500-1000 TABLET, FILM COATED ORAL 3 TIMES DAILY PRN
Qty: 60 TABLET | Refills: 1 | Status: SHIPPED | OUTPATIENT
Start: 2022-11-11

## 2022-11-11 SDOH — ECONOMIC STABILITY: FOOD INSECURITY: WITHIN THE PAST 12 MONTHS, THE FOOD YOU BOUGHT JUST DIDN'T LAST AND YOU DIDN'T HAVE MONEY TO GET MORE.: NEVER TRUE

## 2022-11-11 SDOH — ECONOMIC STABILITY: HOUSING INSECURITY
IN THE LAST 12 MONTHS, WAS THERE A TIME WHEN YOU DID NOT HAVE A STEADY PLACE TO SLEEP OR SLEPT IN A SHELTER (INCLUDING NOW)?: NO

## 2022-11-11 SDOH — ECONOMIC STABILITY: HOUSING INSECURITY: IN THE LAST 12 MONTHS, HOW MANY PLACES HAVE YOU LIVED?: 1

## 2022-11-11 SDOH — ECONOMIC STABILITY: FOOD INSECURITY: WITHIN THE PAST 12 MONTHS, YOU WORRIED THAT YOUR FOOD WOULD RUN OUT BEFORE YOU GOT MONEY TO BUY MORE.: NEVER TRUE

## 2022-11-11 SDOH — ECONOMIC STABILITY: INCOME INSECURITY: IN THE LAST 12 MONTHS, WAS THERE A TIME WHEN YOU WERE NOT ABLE TO PAY THE MORTGAGE OR RENT ON TIME?: NO

## 2022-11-11 ASSESSMENT — ENCOUNTER SYMPTOMS
VOMITING: 0
COLOR CHANGE: 0
SHORTNESS OF BREATH: 0
NAUSEA: 0
ABDOMINAL PAIN: 0
BACK PAIN: 1

## 2022-11-11 ASSESSMENT — SOCIAL DETERMINANTS OF HEALTH (SDOH): HOW HARD IS IT FOR YOU TO PAY FOR THE VERY BASICS LIKE FOOD, HOUSING, MEDICAL CARE, AND HEATING?: NOT HARD AT ALL

## 2022-11-11 NOTE — PROGRESS NOTES
11/11/2022    This is a 55 y.o. female who presents for  Chief Complaint   Patient presents with    Back Pain     Left side        HPI:     CC per above   1 week ago  Was lifting groceries, felt it seize up  Has been babying it and it's not improving   Affects her day to day   L>R  Feels she overused it  Was compensating to make up for chronic R pain   Hurts to stand, walk, sit   Tries to ignore the pain   Feels like someone is squeezing it, muscle spasms  Dreams about being stung by a snake or stung by a bee  Will catch   Tried: hot showers (helped), Naprosyn, laid flat for periods   Needs a hip replacement, seeing Dr. Rhonda Evans      Past Medical History:   Diagnosis Date    Depression     Hypertension        Past Surgical History:   Procedure Laterality Date    CYST REMOVAL      from head not residual problems       Social History     Socioeconomic History    Marital status:      Spouse name: Not on file    Number of children: Not on file    Years of education: Not on file    Highest education level: Not on file   Occupational History    Not on file   Tobacco Use    Smoking status: Never    Smokeless tobacco: Never   Substance and Sexual Activity    Alcohol use: No    Drug use: No    Sexual activity: Yes     Partners: Male   Other Topics Concern    Not on file   Social History Narrative    Not on file     Social Determinants of Health     Financial Resource Strain: Low Risk     Difficulty of Paying Living Expenses: Not hard at all   Food Insecurity: No Food Insecurity    Worried About Running Out of Food in the Last Year: Never true    [address] in the Last Year: Never true   Transportation Needs: No Transportation Needs    Lack of Transportation (Medical): No    Lack of Transportation (Non-Medical):  No   Physical Activity: Not on file   Stress: Not on file   Social Connections: Not on file   Intimate Partner Violence: Not on file   Housing Stability: Low Risk     Unable to Pay for Housing in the Last Year: No    Number of Places Lived in the Last Year: 1    Unstable Housing in the Last Year: No       Family History   Problem Relation Age of Onset    Breast Cancer Sister 62    Breast Cancer Niece 22    Breast Cancer Paternal Aunt 72       Current Outpatient Medications   Medication Sig Dispense Refill    diclofenac sodium (VOLTAREN) 1 % GEL Apply 2-4 g topically 4 times daily as needed for Pain 100 g 1    methylPREDNISolone (MEDROL DOSEPACK) 4 MG tablet Take by mouth. 1 kit 0    methocarbamol (ROBAXIN) 500 MG tablet Take 1-2 tablets by mouth 3 times daily as needed (back pain) 60 tablet 1    FLUoxetine (PROZAC) 20 MG capsule TAKE THREE CAPSULES BY MOUTH DAILY 270 capsule 1    Cholecalciferol (VITAMIN D) 50 MCG (2000 UT) CAPS capsule Take 2,000 Units by mouth daily      lisinopril (PRINIVIL;ZESTRIL) 20 MG tablet TAKE ONE TABLET BY MOUTH DAILY 90 tablet 1    atorvastatin (LIPITOR) 10 MG tablet TAKE ONE TABLET BY MOUTH DAILY 90 tablet 1    Naproxen Sodium 220 MG CAPS Take by mouth as needed for Pain      minocycline (MINOCIN;DYNACIN) 100 MG capsule 2 capsules daily        No current facility-administered medications for this visit. Immunization History   Administered Date(s) Administered    COVID-19, PFIZER PURPLE top, DILUTE for use, (age 15 y+), 30mcg/0.3mL 03/29/2021, 04/19/2021, 01/04/2022    Influenza A (M4S4-32) Vaccine Nasal 12/15/2009    Tdap (Boostrix, Adacel) 01/21/2020    Zoster Recombinant (Shingrix) 01/21/2020       No Known Allergies    Review of Systems   Constitutional:  Negative for activity change, chills, diaphoresis, fatigue, fever and unexpected weight change. Respiratory:  Negative for shortness of breath. Cardiovascular:  Negative for chest pain and leg swelling. Gastrointestinal:  Negative for abdominal pain, nausea and vomiting. Genitourinary:  Negative for difficulty urinating. Musculoskeletal:  Positive for arthralgias, back pain and myalgias.  Negative for gait problem, joint swelling, neck pain and neck stiffness. Skin:  Negative for color change, pallor, rash and wound. Neurological:  Negative for weakness, numbness and headaches. Psychiatric/Behavioral:  Positive for sleep disturbance. Negative for dysphoric mood. The patient is not nervous/anxious. /78   Pulse 84   Temp 98.4 °F (36.9 °C)   Ht 5' 2\" (1.575 m)   Wt 215 lb (97.5 kg)   LMP 01/23/2011   SpO2 98%   BMI 39.32 kg/m²     Physical Exam  Vitals and nursing note reviewed. Constitutional:       General: She is not in acute distress. Appearance: She is well-developed. She is not diaphoretic. HENT:      Head: Normocephalic and atraumatic. Eyes:      Pupils: Pupils are equal, round, and reactive to light. Cardiovascular:      Rate and Rhythm: Normal rate and regular rhythm. Pulmonary:      Effort: Pulmonary effort is normal. No respiratory distress. Abdominal:      Palpations: Abdomen is soft. Tenderness: There is no abdominal tenderness. Musculoskeletal:         General: Tenderness present. No swelling, deformity or signs of injury. Normal range of motion. Cervical back: Normal range of motion and neck supple. Skin:     General: Skin is warm and dry. Coloration: Skin is not pale. Findings: No erythema or rash. Neurological:      Mental Status: She is alert and oriented to person, place, and time. Cranial Nerves: No cranial nerve deficit. Sensory: No sensory deficit. Motor: No weakness. Coordination: Coordination normal.      Gait: Gait normal.   Psychiatric:         Behavior: Behavior normal.         Thought Content: Thought content normal.         Judgment: Judgment normal.       Plan  1. Tenderness over spine  - XR LUMBAR SPINE (2-3 VIEWS); Future    2. Acute left-sided low back pain, unspecified whether sciatica present  - XR LUMBAR SPINE (2-3 VIEWS); Future  - diclofenac sodium (VOLTAREN) 1 % GEL;  Apply 2-4 g topically 4 times daily as needed for Pain  Dispense: 100 g; Refill: 1  - methylPREDNISolone (MEDROL DOSEPACK) 4 MG tablet; Take by mouth. Dispense: 1 kit; Refill: 0  - methocarbamol (ROBAXIN) 500 MG tablet; Take 1-2 tablets by mouth 3 times daily as needed (back pain)  Dispense: 60 tablet; Refill: 1  - [facility] [place]OSR    3. Chronic right-sided low back pain, unspecified whether sciatica present  - XR LUMBAR SPINE (2-3 VIEWS); Future  - diclofenac sodium (VOLTAREN) 1 % GEL; Apply 2-4 g topically 4 times daily as needed for Pain  Dispense: 100 g; Refill: 1  - methylPREDNISolone (MEDROL DOSEPACK) 4 MG tablet; Take by mouth. Dispense: 1 kit; Refill: 0  - methocarbamol (ROBAXIN) 500 MG tablet; Take 1-2 tablets by mouth 3 times daily as needed (back pain)  Dispense: 60 tablet; Refill: 1  - [facility] [place] [facility]OSR      While assessing care for this patient, I have reviewed all pertinent lab work/imaging/ specialist notes and care in reference to those problems addressed above in detail. Appropriate medical decision making was based on this. Please note that portions of this note may have been completed with a voice recognition program. Efforts were made to edit the dictations but occasionally words are mis-transcribed. Return if symptoms worsen or fail to improve.

## 2022-11-29 SDOH — HEALTH STABILITY: PHYSICAL HEALTH: ON AVERAGE, HOW MANY DAYS PER WEEK DO YOU ENGAGE IN MODERATE TO STRENUOUS EXERCISE (LIKE A BRISK WALK)?: 2 DAYS

## 2022-11-29 SDOH — HEALTH STABILITY: PHYSICAL HEALTH: ON AVERAGE, HOW MANY MINUTES DO YOU ENGAGE IN EXERCISE AT THIS LEVEL?: 50 MIN

## 2022-11-29 ASSESSMENT — SOCIAL DETERMINANTS OF HEALTH (SDOH)
WITHIN THE LAST YEAR, HAVE YOU BEEN HUMILIATED OR EMOTIONALLY ABUSED IN OTHER WAYS BY YOUR PARTNER OR EX-PARTNER?: NO
WITHIN THE LAST YEAR, HAVE YOU BEEN KICKED, HIT, SLAPPED, OR OTHERWISE PHYSICALLY HURT BY YOUR PARTNER OR EX-PARTNER?: NO
WITHIN THE LAST YEAR, HAVE YOU BEEN AFRAID OF YOUR PARTNER OR EX-PARTNER?: NO
WITHIN THE LAST YEAR, HAVE TO BEEN RAPED OR FORCED TO HAVE ANY KIND OF SEXUAL ACTIVITY BY YOUR PARTNER OR EX-PARTNER?: NO

## 2022-12-02 ENCOUNTER — TELEPHONE (OUTPATIENT)
Dept: ORTHOPEDIC SURGERY | Age: 57
End: 2022-12-02

## 2022-12-02 NOTE — TELEPHONE ENCOUNTER
LM for the patient that we have to cancel her appointment for 12/2/22 as Dr Severino Goes is out of the office and she can call [phone] to have her appointment rescheduled.

## 2022-12-11 DIAGNOSIS — M54.50 ACUTE LEFT-SIDED LOW BACK PAIN, UNSPECIFIED WHETHER SCIATICA PRESENT: ICD-10-CM

## 2022-12-11 DIAGNOSIS — M54.50 CHRONIC RIGHT-SIDED LOW BACK PAIN, UNSPECIFIED WHETHER SCIATICA PRESENT: ICD-10-CM

## 2022-12-11 DIAGNOSIS — G89.29 CHRONIC RIGHT-SIDED LOW BACK PAIN, UNSPECIFIED WHETHER SCIATICA PRESENT: ICD-10-CM

## 2022-12-11 NOTE — TELEPHONE ENCOUNTER
.Refill Request     CONFIRM preferrred pharmacy with the patient. If Mail Order Rx - Pend for 90 day refill. Last Seen: Last Seen Department: 11/11/2022  Last Seen by PCP: 11/11/2022    Last Written: 11/11/22 100g with 1     If no future appointment scheduled, route STAFF MESSAGE with patient name to the [facility] for scheduling. Next Appointment:   Future Appointments   Date Time Provider Torres Flores   12/16/2022  9:00 AM MD CHRISTIE [facility]   6/22/2023  1:15 PM [facility]  [place] [facility] [place]       Message sent to [employer] to schedule appt with patient?   YES      Requested Prescriptions     Pending Prescriptions Disp Refills    diclofenac sodium (VOLTAREN) 1 % GEL [Pharmacy Med Name: DICLOFENAC SODIUM 1% GEL] 100 g 1     Sig: APPLY 2-4 GRAMS TOPICALLY FOUR TIMES A DAY AS NEEDED FOR PAIN

## 2022-12-16 ENCOUNTER — OFFICE VISIT (OUTPATIENT)
Dept: ORTHOPEDIC SURGERY | Age: 57
End: 2022-12-16
Payer: COMMERCIAL

## 2022-12-16 VITALS — BODY MASS INDEX: 39.56 KG/M2 | HEIGHT: 62 IN | WEIGHT: 215 LBS

## 2022-12-16 DIAGNOSIS — M25.559 HIP PAIN: ICD-10-CM

## 2022-12-16 DIAGNOSIS — M16.11 PRIMARY OSTEOARTHRITIS OF RIGHT HIP: Primary | ICD-10-CM

## 2022-12-16 PROCEDURE — G8427 DOCREV CUR MEDS BY ELIG CLIN: HCPCS | Performed by: ORTHOPAEDIC SURGERY

## 2022-12-16 PROCEDURE — G8484 FLU IMMUNIZE NO ADMIN: HCPCS | Performed by: ORTHOPAEDIC SURGERY

## 2022-12-16 PROCEDURE — 3017F COLORECTAL CA SCREEN DOC REV: CPT | Performed by: ORTHOPAEDIC SURGERY

## 2022-12-16 PROCEDURE — 99214 OFFICE O/P EST MOD 30 MIN: CPT | Performed by: ORTHOPAEDIC SURGERY

## 2022-12-16 PROCEDURE — G8417 CALC BMI ABV UP PARAM F/U: HCPCS | Performed by: ORTHOPAEDIC SURGERY

## 2022-12-16 PROCEDURE — 1036F TOBACCO NON-USER: CPT | Performed by: ORTHOPAEDIC SURGERY

## 2022-12-16 NOTE — LETTER
[facility]   DR. Sheila Wodoall     TODAY'S DATE: 22    PATIENT'S NAME: Paloma Mcdonald    PATIENT'S NUMBER: [id number]    : 1965    PREFERRED PHONE NUMBER: [phone]      WORK PHONE NUMBER: There is no work phone number on file.          DIAGNOSIS:   RIGHT HIP OSTEOARTHRITIS    DATE OF SURGERY: TBD Faraz-Mar    PROCEDURE: RIGHT PRIMARY TOTAL HIP ARTHROPLASTY    SURGEON: Dr. Hoff Peaks: Elective    HOSPITAL: [address] [place] [address] Drive: 23 Hour Observation    ANESTHESIA: Anesthesia Choice  Pre-Op Block requested  YES    LENGTH OF SURGERY: 2.5 hours    EQUIPMENT REQUESTED: [facility] R[facility]      X-RAYS REQUIRED: C-ARM      PCP: Joby Blulock MD    H&P TO BE DONE BY THE PCP      CARDIAC CLEARANCE NEEDED: No     ALLERGIES: No Known Allergies    DME: none    POST-OP VISIT: 14 Days    OTHER INSTRUCTIONS/REMARKS: N/A    INSURANCE INFORMATION: _________________________ CARD IN MEDIA IN EPIC___  CARD FAXED___    PRE-CERTIFICATION REQUIRED: YES   NO   PER _______________________    SURGERY SCHEDULED BY: ____________________________________    PATIENT CALLED AND CONFIRMED DATE & TIME: ______________________

## 2022-12-19 RX ORDER — ATORVASTATIN CALCIUM 10 MG/1
TABLET, FILM COATED ORAL
Qty: 90 TABLET | Refills: 1 | Status: SHIPPED | OUTPATIENT
Start: 2022-12-19

## 2022-12-19 RX ORDER — LISINOPRIL 20 MG/1
TABLET ORAL
Qty: 90 TABLET | Refills: 1 | Status: SHIPPED | OUTPATIENT
Start: 2022-12-19

## 2022-12-19 NOTE — TELEPHONE ENCOUNTER
.Refill Request     CONFIRM preferrred pharmacy with the patient. If Mail Order Rx - Pend for 90 day refill. Last Seen: Last Seen Department: 11/11/2022  Last Seen by PCP: 11/11/2022    Last Written: 5-4-22 90 with 1     If no future appointment scheduled, route STAFF MESSAGE with patient name to the [facility] for scheduling. Next Appointment:   Future Appointments   Date Time Provider Torres Flores   6/22/2023  1:15 PM MD KAMI Liang  [place] - [place]JAYE       Message sent to [address] [place] [address] to schedule appt with patient?   N/A      Requested Prescriptions     Pending Prescriptions Disp Refills    atorvastatin (LIPITOR) 10 MG tablet [Pharmacy Med Name: ATORVASTATIN 10 MG TABLET] 90 tablet 1     Sig: TAKE ONE TABLET BY MOUTH DAILY

## 2022-12-19 NOTE — TELEPHONE ENCOUNTER
Refill Request     CONFIRM preferrred pharmacy with the patient. If Mail Order Rx - Pend for 90 day refill. Last Seen: Last Seen Department: 11/11/2022  Last Seen by PCP: 11/11/2022    Last Written: 5/31/2022    If no future appointment scheduled, route STAFF MESSAGE with patient name to the [facility] for scheduling. Next Appointment:   Future Appointments   Date Time Provider Torres Flores   6/22/2023  1:15 PM MD KAMI Rosenthal  [place] - [place]       Message sent to [address] [place] [address] to schedule appt with patient?   NO      Requested Prescriptions     Pending Prescriptions Disp Refills    lisinopril (PRINIVIL;ZESTRIL) 20 MG tablet 90 tablet 1     Sig: TAKE ONE TABLET BY MOUTH DAILY

## 2022-12-19 NOTE — PROGRESS NOTES
ORTHOPAEDIC SURGERY FOLLOWUP VISIT    CHIEF COMPLAINT:  Right hip    DATE OF INJURY: N/A  DIAGNOSIS: Right hip osteoarthritis  DATE OF SURGERY: N/A    HISTORY OF PRESENT ILLNESS:  This is a very pleasant 81-year-old female who presents for evaluation of her right hip. She has had longstanding history of right hip pain. She was previously seen in July 2021. At that time, she was morbidly obese. She has undergone a significant dietary and activity related change. She has lost over 100 pounds. She has decreased her BMI to less than 40. She continues to have pain on a daily basis mostly about the groin, lateral hip, and buttock areas. Overall, she is very happy with her progress and has had other significant health benefits as a result of her significant weight loss. She is interested in proceeding on elective basis with hip replacement surgery, which has been offered to her in the past in the event that she is able to optimize her medical status. PHYSICAL EXAM:  5 feet 2 inches tall  215 pounds  BMI 39.3  Gen: Appearing female of stated age. No acute distress. Cooperative with exam.  Focused examination of the right lower extremity:  There are no cuts, open wounds, or abrasions to the right hip. No significant trochanteric tenderness. Hip range of motion is full extension to 95 degrees of flexion. There is pain localizing to the groin with hip range of motion. There is obligate external rotation. There is internal rotation of 5 degrees external rotation of 40. There is pain with axial loading of the lower extremity. There is no significant gluteal tenderness or sacroiliac tenderness. Sensation is intact to light touch in deep peroneal, superficial peroneal, tibial, sural, and saphenous nerve distributions. Motor function is intact to EHL, FHL, tibialis anterior, and gastroc. There is brisk capillary refill to the toes and a strong palpable dorsalis pedis pulse.   Compartments are soft and compressible. There is no calf tenderness and a negative Homans' sign. Gait: minor trendelenberg gait. RADIOGRAPHIC EXAM:  An AP pelvis as well as AP and frog lateral x-rays of the right hip demonstrate a symmetric pelvic ring. No evidence of asymmetry. No fracture or dislocation. There is severe joint space loss involving the right hip with bone to bone apposition superiorly. This has been progressive from her prior x-rays in July 2021. There is osteophyte formation about the femoral head neck junction as well as superolateral acetabulum. On lateral x-ray, there is a large posterior osteophyte originating from the femoral head, with likely impingement. ASSESSMENT AND PLAN:  Right hip osteoarthritis    The patient has made tremendous progress in terms of optimizing herself for an elective total joint replacement. Her BMI has declined from 56.7 to 39 over the past 1.5 years. She has had other health benefits. She was congratulated for her significant efforts. she continues to have daily pain to her right hip. She is interested in proceeding with total hip arthroplasty on elective basis. I discussed the risks, benefits, and alternatives to surgical intervention as well as a standard postoperative course. The patient would like to move forward early in 2023. I used a model of the hip replacement to facilitate the discussion. All questions were answered. She will proceed with a history and physical exam with her primary care physician within the month prior to surgery.     Yanet Ramos MD

## 2023-01-10 ENCOUNTER — TELEPHONE (OUTPATIENT)
Dept: ORTHOPEDIC SURGERY | Age: 58
End: 2023-01-10

## 2023-01-10 NOTE — TELEPHONE ENCOUNTER
Left several messages trying to schedule surgery,  12/21, 12/29, 01/05/23 and today 01/10/23. IF patient calls please contact me so I can schedule.  KB

## 2023-01-13 NOTE — PROGRESS NOTES
Donald Canavan [facility]    Age 65 y.o.    female    1965    MRN [id number]    3/6/2023  Arrival Time_____________  OR Time____________[address]     Procedure(s):  RIGHT PRIMARY TOTAL HIP ARTHROPLASTY              MCKENZIE & LARRY NOTE: BLOCK                      General   Surgeon(s):  Cipriano Amanda, MD      DAY ADMIT ___  SDS/OP ___  OUTPT IN BED ___        Phone [phone] (home)     PCP _____________________ Phone_________________ Epic ( ) Epic CE ( ) Appt ________    ADDITIONAL INFO __________________________________ Cardio/Consult _____________    NOTES _____________________________________________________________________    ____________________________________________________________________________    PAT APPT DATE:________ TIME: ________  FAXED QAD: _______  (__) H&P w/ Hospitalist  __________________________________________________________________________  Preop Nurse phone screen complete: _____________  (__) CBC     (__) W/ DIFF ___________     (__) Hgb A1C    ___________  (__) CHEST X RAY   __________  (__) LIPID PROFILE  ___________  (__) EKG   __________  (__) PT/PTT   ___________  (__) PFT's   __________  (__) BMP   ___________  (__) CAROTIDS  __________  (__) CMP   ___________  (__) VEIN MAPPING  __________  (__) U/A   ___________  (__) HISTORY & PHYSICAL __________  (__) URINE C & S  ___________  (__) CARDIAC CLEARANCE __________  (__) U/A W/ FLEX  ___________  (__) PULM.  CLEARANCE __________  (__) SERUM PREGNANCY ___________  (__) Check Epic DOS orders __________  (__) TYPE & SCREEN __________repeat ( ) (__)  __________________ __________  (__) ALBUMIN   ___________  (__)  __________________ __________  (__) TRANSFERRIN  ___________  (__)  __________________ __________  (__) LIVER PROFILE  ___________  (__)  __________________ __________  (__) MRSA NASAL SWAB ___________  (__) URINE PREG DOS __________  (__) SED RATE  ___________  (__) BLOOD SUGAR DOS __________  (__) C-REACTIVE PROTEIN ___________    (__) VITAMIN D HYDROXY ___________  (__) BLOOD THINNERS __________    (__) ACE/ ARBS: _____________________     (__) BETABLOCKERS __________________

## 2023-01-16 DIAGNOSIS — M54.50 ACUTE LEFT-SIDED LOW BACK PAIN, UNSPECIFIED WHETHER SCIATICA PRESENT: ICD-10-CM

## 2023-01-16 DIAGNOSIS — M54.50 CHRONIC RIGHT-SIDED LOW BACK PAIN, UNSPECIFIED WHETHER SCIATICA PRESENT: ICD-10-CM

## 2023-01-16 DIAGNOSIS — G89.29 CHRONIC RIGHT-SIDED LOW BACK PAIN, UNSPECIFIED WHETHER SCIATICA PRESENT: ICD-10-CM

## 2023-01-16 RX ORDER — METHOCARBAMOL 500 MG/1
500-1000 TABLET, FILM COATED ORAL 3 TIMES DAILY PRN
Qty: 60 TABLET | Refills: 0 | Status: SHIPPED | OUTPATIENT
Start: 2023-01-16

## 2023-01-16 NOTE — TELEPHONE ENCOUNTER
Refill Request     CONFIRM preferrred pharmacy with the patient. If Mail Order Rx - Pend for 90 day refill. Last Seen: Last Seen Department: 11/11/2022  Last Seen by PCP: 11/11/2022    Last Written: 11/11/22 with 1 refill #60    If no future appointment scheduled, route STAFF MESSAGE with patient name to the [facility] for scheduling. Next Appointment:   Future Appointments   Date Time Provider Torres Flores   2/13/2023  2:00 PM [facility] [place] [facility]   3/21/2023 11:15 AM Mardeen Kocher, [facility]   6/22/2023  1:15 PM Juan Ronquillo MD [facility] [place] - [place]       Message sent to [address] [place] [address] to schedule appt with patient?   NO      Requested Prescriptions     Pending Prescriptions Disp Refills    methocarbamol (ROBAXIN) 500 MG tablet 60 tablet 1     Sig: Take 1-2 tablets by mouth 3 times daily as needed (back pain)

## 2023-02-13 ENCOUNTER — OFFICE VISIT (OUTPATIENT)
Dept: FAMILY MEDICINE CLINIC | Age: 58
End: 2023-02-13
Payer: COMMERCIAL

## 2023-02-13 VITALS
DIASTOLIC BLOOD PRESSURE: 76 MMHG | OXYGEN SATURATION: 99 % | BODY MASS INDEX: 39.18 KG/M2 | WEIGHT: 214.2 LBS | HEART RATE: 70 BPM | SYSTOLIC BLOOD PRESSURE: 130 MMHG

## 2023-02-13 DIAGNOSIS — I10 PRIMARY HYPERTENSION: ICD-10-CM

## 2023-02-13 DIAGNOSIS — Z01.818 PRE-OP EXAMINATION: Primary | ICD-10-CM

## 2023-02-13 PROCEDURE — 3017F COLORECTAL CA SCREEN DOC REV: CPT | Performed by: STUDENT IN AN ORGANIZED HEALTH CARE EDUCATION/TRAINING PROGRAM

## 2023-02-13 PROCEDURE — 3078F DIAST BP <80 MM HG: CPT | Performed by: STUDENT IN AN ORGANIZED HEALTH CARE EDUCATION/TRAINING PROGRAM

## 2023-02-13 PROCEDURE — 99214 OFFICE O/P EST MOD 30 MIN: CPT | Performed by: STUDENT IN AN ORGANIZED HEALTH CARE EDUCATION/TRAINING PROGRAM

## 2023-02-13 PROCEDURE — G8427 DOCREV CUR MEDS BY ELIG CLIN: HCPCS | Performed by: STUDENT IN AN ORGANIZED HEALTH CARE EDUCATION/TRAINING PROGRAM

## 2023-02-13 PROCEDURE — G8484 FLU IMMUNIZE NO ADMIN: HCPCS | Performed by: STUDENT IN AN ORGANIZED HEALTH CARE EDUCATION/TRAINING PROGRAM

## 2023-02-13 PROCEDURE — G8417 CALC BMI ABV UP PARAM F/U: HCPCS | Performed by: STUDENT IN AN ORGANIZED HEALTH CARE EDUCATION/TRAINING PROGRAM

## 2023-02-13 PROCEDURE — 3075F SYST BP GE 130 - 139MM HG: CPT | Performed by: STUDENT IN AN ORGANIZED HEALTH CARE EDUCATION/TRAINING PROGRAM

## 2023-02-13 PROCEDURE — 1036F TOBACCO NON-USER: CPT | Performed by: STUDENT IN AN ORGANIZED HEALTH CARE EDUCATION/TRAINING PROGRAM

## 2023-02-13 PROCEDURE — 93000 ELECTROCARDIOGRAM COMPLETE: CPT | Performed by: STUDENT IN AN ORGANIZED HEALTH CARE EDUCATION/TRAINING PROGRAM

## 2023-02-13 ASSESSMENT — PATIENT HEALTH QUESTIONNAIRE - PHQ9
SUM OF ALL RESPONSES TO PHQ QUESTIONS 1-9: 2
SUM OF ALL RESPONSES TO PHQ QUESTIONS 1-9: 2
3. TROUBLE FALLING OR STAYING ASLEEP: 0
1. LITTLE INTEREST OR PLEASURE IN DOING THINGS: 1
9. THOUGHTS THAT YOU WOULD BE BETTER OFF DEAD, OR OF HURTING YOURSELF: 0
10. IF YOU CHECKED OFF ANY PROBLEMS, HOW DIFFICULT HAVE THESE PROBLEMS MADE IT FOR YOU TO DO YOUR WORK, TAKE CARE OF THINGS AT HOME, OR GET ALONG WITH OTHER PEOPLE: 0
6. FEELING BAD ABOUT YOURSELF - OR THAT YOU ARE A FAILURE OR HAVE LET YOURSELF OR YOUR FAMILY DOWN: 0
7. TROUBLE CONCENTRATING ON THINGS, SUCH AS READING THE NEWSPAPER OR WATCHING TELEVISION: 0
8. MOVING OR SPEAKING SO SLOWLY THAT OTHER PEOPLE COULD HAVE NOTICED. OR THE OPPOSITE, BEING SO FIGETY OR RESTLESS THAT YOU HAVE BEEN MOVING AROUND A LOT MORE THAN USUAL: 0
4. FEELING TIRED OR HAVING LITTLE ENERGY: 0
SUM OF ALL RESPONSES TO PHQ9 QUESTIONS 1 & 2: 2
SUM OF ALL RESPONSES TO PHQ QUESTIONS 1-9: 2
5. POOR APPETITE OR OVEREATING: 0
2. FEELING DOWN, DEPRESSED OR HOPELESS: 1
SUM OF ALL RESPONSES TO PHQ QUESTIONS 1-9: 2

## 2023-02-13 ASSESSMENT — ENCOUNTER SYMPTOMS
BLOOD IN STOOL: 0
SHORTNESS OF BREATH: 0
ABDOMINAL DISTENTION: 0
WHEEZING: 0
CHEST TIGHTNESS: 0
PHOTOPHOBIA: 0

## 2023-02-13 NOTE — PROGRESS NOTES
Subjective:     Alex Ramírez is a 65 y.o.  female  who presents to the office today for a preoperative consultation at the request of surgeon Dr. Seema Altamirano who plans on performing right primary total hip arthroplasty on March 6. Planned anesthesia is General.  The patient has the following known anesthesia issues: family history of problems with anesthesia  (father had hypotension with anesthesia) Patient has a bleeding risk of : taking naproxen for hip pain. Patient does not have objection to receiving blood products if needed. Family hx of blood disorders or problems with anesthesia (malignant hyperthermia) : none other than listed above    Mets: 4-10  can do 1-2 hours of house work    Patient Active Problem List   Diagnosis    HTN (hypertension)    Depression    Mixed hyperlipidemia    Chronic dermatitis    Family history of melanoma    Postmenopausal    Trochanteric bursitis of right hip    Closed displaced fracture of base of fifth metacarpal bone of right hand    Mucous cyst of finger       No Known Allergies    Family History   Problem Relation Age of Onset    Breast Cancer Sister 62    Breast Cancer Niece 22    Breast Cancer Paternal Aunt 72         Review of Systems  Review of Systems   Constitutional:  Negative for fever and unexpected weight change. HENT:  Negative for nosebleeds. Eyes:  Negative for photophobia. Respiratory:  Negative for chest tightness, shortness of breath and wheezing. Cardiovascular:  Negative for chest pain, palpitations and leg swelling. Gastrointestinal:  Negative for abdominal distention and blood in stool. Genitourinary:  Negative for dysuria. Musculoskeletal:  Negative for gait problem. Neurological:  Negative for seizures and syncope. Psychiatric/Behavioral:  Negative for behavioral problems. Objective:      Physical Exam    Physical Exam  Constitutional:       Appearance: Normal appearance. HENT:      Head: Atraumatic. Right Ear: External ear normal.      Left Ear: External ear normal.      Nose: Nose normal.      Mouth/Throat:      Mouth: Mucous membranes are moist.      Pharynx: Oropharynx is clear. Eyes:      Extraocular Movements: Extraocular movements intact. Conjunctiva/sclera: Conjunctivae normal.      Pupils: Pupils are equal, round, and reactive to light. Cardiovascular:      Rate and Rhythm: Normal rate and regular rhythm. Heart sounds: Normal heart sounds. No murmur heard. Pulmonary:      Effort: Pulmonary effort is normal.      Breath sounds: Normal breath sounds. Abdominal:      General: Bowel sounds are normal.      Palpations: Abdomen is soft. Musculoskeletal:         General: Normal range of motion. Cervical back: Normal range of motion. Right lower leg: No edema. Left lower leg: No edema. Skin:     Capillary Refill: Capillary refill takes less than 2 seconds. Neurological:      General: No focal deficit present. Mental Status: She is alert. Psychiatric:         Mood and Affect: Mood normal.           Cardiographics  ECG: normal sinus rhythm, no blocks or conduction defects, no ischemic changes  Echocardiogram: not done      Lab Review         No visits with results within 2 Month(s) from this visit.    Latest known visit with results is:   Orders Only on 06/22/2022   Component Date Value    TSH 06/22/2022 2.47     Vit D, 25-Hydroxy 06/22/2022 39.1     Cholesterol, Total 06/22/2022 151     Triglycerides 06/22/2022 72     HDL 06/22/2022 50     LDL Calculated 06/22/2022 87     VLDL Cholesterol Calcula* 06/22/2022 14     Hep C Ab Interp 06/22/2022 Non-reactive     HIV Ag/Ab 06/22/2022 Non-Reactive     HIV-1 Antibody 06/22/2022 Non-Reactive     HIV ANTIGEN 06/22/2022 Non-Reactive     HIV-2 Ab 06/22/2022 Non-Reactive     Hemoglobin A1C 06/22/2022 5.2     eAG 06/22/2022 102.5     WBC 06/22/2022 8.0     RBC 06/22/2022 4.62     Hemoglobin 06/22/2022 13.5     Hematocrit 06/22/2022 40.4     MCV 06/22/2022 87.5     MCH 06/22/2022 29.3     MCHC 06/22/2022 33.5     RDW 06/22/2022 14.0     Platelets 97/24/9572 301     MPV 06/22/2022 8.1     Neutrophils % 06/22/2022 73.0     Lymphocytes % 06/22/2022 20.0     Monocytes % 06/22/2022 6.4     Eosinophils % 06/22/2022 0.1     Basophils % 06/22/2022 0.5     Neutrophils Absolute 06/22/2022 5.8     Lymphocytes Absolute 06/22/2022 1.6     Monocytes Absolute 06/22/2022 0.5     Eosinophils Absolute 06/22/2022 0.0     Basophils Absolute 06/22/2022 0.0     Sodium 06/22/2022 140     Potassium 06/22/2022 5.2 (A)     Chloride 06/22/2022 102     CO2 06/22/2022 25     Anion Gap 06/22/2022 13     Glucose 06/22/2022 83     BUN 06/22/2022 18     Creatinine 06/22/2022 0.7     GFR Non- 06/22/2022 >60     GFR  06/22/2022 >60     Calcium 06/22/2022 10.2     Total Protein 06/22/2022 7.3     Albumin 06/22/2022 4.7     Albumin/Globulin Ratio 06/22/2022 1.8     Total Bilirubin 06/22/2022 0.4     Alkaline Phosphatase 06/22/2022 111     ALT 06/22/2022 17     AST 06/22/2022 16       Assessment:     65 y.o. female  with planned surgery as above. Known risk factors for perioperative complications: None    Difficulty with intubation is not anticipated. Cardiac Risk Estimation: per the Revised Cardiac Risk Index (Circ. 100:1043, 1999), the patient's risk factors for cardiac complications include none, putting them in: RCI RISK CLASS I (0 risk factors, risk of major cardiac compl. appr. 0.5%)    Current medications which may produce withdrawal symptoms if withheld perioperatively: none        Plan:      1. Preoperative workup as follows ECG, electrolytes, glucose, coagulation studies  2. Change in medication regimen before surgery: discontinue NSAIDs 7d before surgery  3. Other measures: CBC w diff, albumin  4. Patient's overall benefit from surgery is not outweighed by risk for age and comorbidity pending normal lab values.  Cleared from family medicine standpoint. Final clearance per operating surgeon.        Lilly Arambula MD  2/13/2023

## 2023-02-20 DIAGNOSIS — F33.0 MILD EPISODE OF RECURRENT MAJOR DEPRESSIVE DISORDER (HCC): ICD-10-CM

## 2023-02-20 NOTE — TELEPHONE ENCOUNTER
I dont see the medication on her list bu the pharmacy sent over DULOXETINE HCL DR 20 MG CAP. 3 Times a day.  For a quantity of 60 tabs

## 2023-02-20 NOTE — TELEPHONE ENCOUNTER
I called the pharmacy and they stated that it was a new patient of theres and they had asked them to call it in for them. I tried to call the patient to discuss further with them however they had a voice mailbox that hadn't been set up yet.

## 2023-02-21 RX ORDER — FLUOXETINE HYDROCHLORIDE 20 MG/1
CAPSULE ORAL
Qty: 270 CAPSULE | Refills: 0 | Status: SHIPPED | OUTPATIENT
Start: 2023-02-21

## 2023-02-22 ENCOUNTER — TELEPHONE (OUTPATIENT)
Dept: ORTHOPEDIC SURGERY | Age: 58
End: 2023-02-22

## 2023-02-27 ENCOUNTER — TELEPHONE (OUTPATIENT)
Dept: ORTHOPEDIC SURGERY | Age: 58
End: 2023-02-27

## 2023-02-27 NOTE — TELEPHONE ENCOUNTER
ORTHOPAEDIC NURSE NAVIGATOR SUMMARY NOTE  Pt has post op N/V    Anticipated Date of Surgery: 3/6/23    Recieved Pre-Op Education: yes   In person class:no  Pt used educational link:Yes   Pt completed pre and post test to measure learning:Yes    If pt did not complete either, why not? N/A  ERAS protocol explained to pt. Pt does not have the following medical conditions:  -Diabetes  -Gastroparesis  -CHF  -Fluid restricted diet  -Known difficult airway  Pt instructed to drink up to 40 oz of Gatorade type drink the evening prior to surgery. Pt told to HOLD 40 oz of water the day of surgery due to BMI over 35  Pt verbalized understanding. PCP: Yeimi Colunga MD   Phone #: [phone]    Date of PCP Visit for H&P: 2/13/23    Any Noted Concerns from PCP prior to surgery:  No   If Yes, what concerns?:    IS THE PATIENT IN A PAIN MANAGEMENT PROGRAM?:   No     Review of Past Medical History Reveals History of:      Critical Lab Values:   Hgb/Hct:   Hemoglobin (g/dL)   Date Value   02/13/2023 13.1   /  Hematocrit (%)   Date Value   02/13/2023 40.6      HgbA1C:    Lab Results   Component Value Date    LABA1C 5.2 06/22/2022    LABA1C 5.8 06/21/2021    LABA1C 5.8 03/27/2020      Albumin:    Lab Results   Component Value Date    LABALBU 4.4 02/13/2023      BUN/Cr:   BUN (mg/dL)   Date Value   02/13/2023 21 (H)   /  Creatinine (mg/dL)   Date Value   02/13/2023 0.7      BMI:    BMI Readings from Last 1 Encounters:   02/13/23 39.18 kg/m²        Coronary Artery Disease/HTN/CHF History: Yes-HTN      Cardiologist:     Cardiac Clearance Necessary: No    Date of Cardiac Clearance Appt: On Plavix? No,  If YES, when will they stop taking?     Final Cardiac Recommendations:N/A   -On any anticoagulation-NONE       Diabetes History: No    Most Recent HgbA1C: N/A    PCP or Endocrine Recommendations: N/A    Nutritionist/Dietician Consult Scheduled: N/A    Final Plan For Diabetic Control: N/A   Pulmonary: COPD/Emphysema/ Use of home oxygen: NONE     Alcohol use:        DVT Risk Stratification:  Low      Vascular Consult Ordered:  NA    Date of Vascular Appt:     Hematology/Oncology Consult Ordered:  NA    Date of Hematology/Oncology Appt:     Final Recommendation For DVT Prophylaxis:   -Smoking history or use of estrogen-         BMI Greater than 40 at time of scheduling?: No    Has Surgeon been notified of BMI concern? Not Applicable    Weight Loss Clinic Consult Ordered: Not Applicable    Date of Wt Loss Clinic Appt:     BMI at time of surgery (if went through [facility]): Additional Medical Concerns:         Discharge Disposition Information:     Attended Pre-Hab Program: no     Anticipated Discharge Disposition:  Home with Mary Annstephania Mcrae    Who will be with patient at home following discharge? , Sister to come stay      Equipment pt already has:  Garon Lusher, raised toilet seat, BSC, shower bench, grab bars, lift chair.     Bedroom on first or second floor: First   Bathroom on first or second floor: first   Weight bearing status: full   Pre-op ambulatory status: none   Number of entry steps: 1 then 1 into home   Caregiver assistance: full time    Pt plan to DC same day: ok to    Ayse Mcrae preference: Teresa Martines RN  2/27/2023

## 2023-02-28 ENCOUNTER — ANESTHESIA EVENT (OUTPATIENT)
Dept: OPERATING ROOM | Age: 58
End: 2023-02-28
Payer: COMMERCIAL

## 2023-03-01 RX ORDER — ACETAMINOPHEN 500 MG
1000 TABLET ORAL EVERY 6 HOURS PRN
COMMUNITY

## 2023-03-01 NOTE — PROGRESS NOTES
Pt instructed to drink up to 40 oz of Gatorade type drink the evening prior to surgery. Pt informed they can have up to 40 oz of water and that it must be completed 2 hours prior to scheduled surgery   PATIENT STATES SHE WAS INSTRUCTED BY [address] [place] [address] BUT NO FLUIDS AFTER MN.

## 2023-03-01 NOTE — PROGRESS NOTES
1. Do not eat or drink anything after 12 midnight prior to surgery. This includes no water, chewing gum mints, or ice chips. You may brush your teeth and gargle the day of surgery but DO NOT SWALLOW THE WATER. 2. Please see your family doctor/pediatrician for a history and physical and/or concerning medications. Bring any test results/reports from your physician's office. If you are under the care of a heart doctor or specialist please be aware that you may be asked to see him or her for clearance. 3. You may be asked to stop blood thinners such as Coumadin, Plavix, Fragmin, and Lovenox or Anti-inflammatories such as Aspirin, Ibuprofen, Advil, and Naproxen prior to your surgery. Please check with your doctor before stopping these or any other medications. 4. Do not smoke, and do not drink any alcoholic beverages 24 hours prior to surgery. 5. You MUST make arrangements for a responsible adult to take you home after your surgery. For your safety, you will not be allowed to leave alone or drive yourself home. Your surgery will be cancelled if you do not have a ride home. Also for your safety, it is strongly suggested someone stay with you the first 24 hrs after your surgery. 6. A parent/legal guardian must accompany a child scheduled for surgery and plan to stay at the hospital until the child is discharged. Please do not bring other children with you. 7. For your comfort,please wear simple, loose fitting clothing to the hospital.  Please do not bring valuables (money, credit cards, checkbooks, etc.) Do not wear any makeup (including no eye makeup) or nail polish on your fingers or toes. 8. For your safety, please DO NOT wear any jewelry or piercings on day of surgery. All body piercing jewelry must be removed. 9. If you have dentures, they will be removed before going to the OR; for your convenience we will provide you with a container.   If you wear contact lenses or glasses, they will be removed, they will be removed, please bring a case for them. 10. If appicable,Please see your family doctor/pediatrician for a history & physical and/or concerning medications. Bring any test results/reports from your physician's office. 11. Remember to bring Blood Bank bracelet to the hospital on the day of surgery. 12. If you have a Living Will and Durable Power of  for Healthcare, please bring in a copy. 15. Notify your Surgeon if you develop any illness between now and surgery  time, cough, cold, fever, sore throat, nausea, vomiting, etc.  Please notify your surgeon if you experience dizziness, shortness of breath or blurred vision between now & the time of your surgery   14. DO NOT shave your operative site 96 hours prior to surgery. For face & neck surgery, men may use an electric razor 48 hours prior to surgery. 15. Shower the night before surgery with _X__Antibacterial soap _X__Hibiclens   16. To provide excellent care visitors will be limited to one in the room at any given time. 17.  Please bring picture ID and insurance card. 18.  Visit our web site for additional information:  [employer]/surgery.

## 2023-03-03 ENCOUNTER — TELEPHONE (OUTPATIENT)
Dept: ORTHOPEDIC SURGERY | Age: 58
End: 2023-03-03

## 2023-03-03 NOTE — TELEPHONE ENCOUNTER
Other PATIENT CALLED STATES THAT SHE HAS NOT HEARD FROM [facility] REGARDING HER POST OP THERAPY. PATIENT WANTS TO KNOW WHEN SHE SHOULD HEAR OR IF SHE CAN HAVE CONTACT INFO.  Lists of hospitals in the [place] CALL TO [facility] [phone]

## 2023-03-06 ENCOUNTER — APPOINTMENT (OUTPATIENT)
Dept: GENERAL RADIOLOGY | Age: 58
End: 2023-03-06
Attending: ORTHOPAEDIC SURGERY
Payer: COMMERCIAL

## 2023-03-06 ENCOUNTER — ANESTHESIA (OUTPATIENT)
Dept: OPERATING ROOM | Age: 58
End: 2023-03-06
Payer: COMMERCIAL

## 2023-03-06 ENCOUNTER — HOSPITAL ENCOUNTER (OUTPATIENT)
Age: 58
Setting detail: OBSERVATION
Discharge: HOME | End: 2023-03-07
Attending: ORTHOPAEDIC SURGERY | Admitting: ORTHOPAEDIC SURGERY
Payer: COMMERCIAL

## 2023-03-06 DIAGNOSIS — Z96.641 STATUS POST TOTAL REPLACEMENT OF RIGHT HIP: Primary | ICD-10-CM

## 2023-03-06 PROBLEM — Z48.89 OBSERVATION AFTER SURGERY: Status: ACTIVE | Noted: 2023-03-06

## 2023-03-06 LAB
ABO/RH: NORMAL
ANTIBODY SCREEN: NORMAL
APTT: 30.7 SEC (ref 23–34.3)
GLUCOSE BLD-MCNC: 98 MG/DL (ref 70–99)
PERFORMED ON: NORMAL

## 2023-03-06 PROCEDURE — 85730 THROMBOPLASTIN TIME PARTIAL: CPT

## 2023-03-06 PROCEDURE — 97530 THERAPEUTIC ACTIVITIES: CPT

## 2023-03-06 PROCEDURE — 3700000001 HC ADD 15 MINUTES (ANESTHESIA): Performed by: ORTHOPAEDIC SURGERY

## 2023-03-06 PROCEDURE — C1776 JOINT DEVICE (IMPLANTABLE): HCPCS | Performed by: ORTHOPAEDIC SURGERY

## 2023-03-06 PROCEDURE — 2709999900 HC NON-CHARGEABLE SUPPLY: Performed by: ORTHOPAEDIC SURGERY

## 2023-03-06 PROCEDURE — 3600000015 HC SURGERY LEVEL 5 ADDTL 15MIN: Performed by: ORTHOPAEDIC SURGERY

## 2023-03-06 PROCEDURE — 86901 BLOOD TYPING SEROLOGIC RH(D): CPT

## 2023-03-06 PROCEDURE — 6360000002 HC RX W HCPCS

## 2023-03-06 PROCEDURE — 3600000005 HC SURGERY LEVEL 5 BASE: Performed by: ORTHOPAEDIC SURGERY

## 2023-03-06 PROCEDURE — 73502 X-RAY EXAM HIP UNI 2-3 VIEWS: CPT

## 2023-03-06 PROCEDURE — 6360000002 HC RX W HCPCS: Performed by: ORTHOPAEDIC SURGERY

## 2023-03-06 PROCEDURE — 3600000014 HC SURGERY LEVEL 4 ADDTL 15MIN: Performed by: ORTHOPAEDIC SURGERY

## 2023-03-06 PROCEDURE — 6360000002 HC RX W HCPCS: Performed by: ANESTHESIOLOGY

## 2023-03-06 PROCEDURE — 3700000000 HC ANESTHESIA ATTENDED CARE: Performed by: ORTHOPAEDIC SURGERY

## 2023-03-06 PROCEDURE — G0378 HOSPITAL OBSERVATION PER HR: HCPCS

## 2023-03-06 PROCEDURE — 2500000003 HC RX 250 WO HCPCS

## 2023-03-06 PROCEDURE — 6370000000 HC RX 637 (ALT 250 FOR IP): Performed by: ORTHOPAEDIC SURGERY

## 2023-03-06 PROCEDURE — 2580000003 HC RX 258: Performed by: ORTHOPAEDIC SURGERY

## 2023-03-06 PROCEDURE — 2720000010 HC SURG SUPPLY STERILE: Performed by: ORTHOPAEDIC SURGERY

## 2023-03-06 PROCEDURE — 64447 NJX AA&/STRD FEMORAL NRV IMG: CPT | Performed by: ANESTHESIOLOGY

## 2023-03-06 PROCEDURE — 6370000000 HC RX 637 (ALT 250 FOR IP): Performed by: ANESTHESIOLOGY

## 2023-03-06 PROCEDURE — 97535 SELF CARE MNGMENT TRAINING: CPT

## 2023-03-06 PROCEDURE — 27130 TOTAL HIP ARTHROPLASTY: CPT | Performed by: ORTHOPAEDIC SURGERY

## 2023-03-06 PROCEDURE — 2500000003 HC RX 250 WO HCPCS: Performed by: ANESTHESIOLOGY

## 2023-03-06 PROCEDURE — C1713 ANCHOR/SCREW BN/BN,TIS/BN: HCPCS | Performed by: ORTHOPAEDIC SURGERY

## 2023-03-06 PROCEDURE — 2580000003 HC RX 258: Performed by: ANESTHESIOLOGY

## 2023-03-06 PROCEDURE — A4217 STERILE WATER/SALINE, 500 ML: HCPCS | Performed by: ORTHOPAEDIC SURGERY

## 2023-03-06 PROCEDURE — 97165 OT EVAL LOW COMPLEX 30 MIN: CPT

## 2023-03-06 PROCEDURE — 3600000004 HC SURGERY LEVEL 4 BASE: Performed by: ORTHOPAEDIC SURGERY

## 2023-03-06 PROCEDURE — 86850 RBC ANTIBODY SCREEN: CPT

## 2023-03-06 PROCEDURE — 97161 PT EVAL LOW COMPLEX 20 MIN: CPT

## 2023-03-06 PROCEDURE — 7100000000 HC PACU RECOVERY - FIRST 15 MIN: Performed by: ORTHOPAEDIC SURGERY

## 2023-03-06 PROCEDURE — 2500000003 HC RX 250 WO HCPCS: Performed by: ORTHOPAEDIC SURGERY

## 2023-03-06 PROCEDURE — 7100000001 HC PACU RECOVERY - ADDTL 15 MIN: Performed by: ORTHOPAEDIC SURGERY

## 2023-03-06 PROCEDURE — 86900 BLOOD TYPING SEROLOGIC ABO: CPT

## 2023-03-06 PROCEDURE — 3209999900 FLUORO FOR SURGICAL PROCEDURES

## 2023-03-06 DEVICE — OR3O DUAL MOBILITY XLPE INSERT 22/36
Type: IMPLANTABLE DEVICE | Site: HIP | Status: FUNCTIONAL
Brand: OR3O DUAL MOBILITY

## 2023-03-06 DEVICE — OXINIUM FEMORAL HEAD 12/14 TAPER                                    22 MM +0
Type: IMPLANTABLE DEVICE | Site: HIP | Status: FUNCTIONAL
Brand: OXINIUM

## 2023-03-06 DEVICE — SYNERGY POROUS COATED FEMORAL SIZE 12
Type: IMPLANTABLE DEVICE | Site: HIP | Status: FUNCTIONAL
Brand: SYNERGY

## 2023-03-06 DEVICE — R3 3 HOLE ACETABULAR SHELL 48MM
Type: IMPLANTABLE DEVICE | Site: HIP | Status: FUNCTIONAL
Brand: R3 ACETABULAR

## 2023-03-06 DEVICE — OR3O DUAL MOBILITY LINER 36/48
Type: IMPLANTABLE DEVICE | Site: HIP | Status: FUNCTIONAL
Brand: OR3O DUAL MOBILITY

## 2023-03-06 DEVICE — REFLECTION SPHERICAL HEAD SCREW 25MM
Type: IMPLANTABLE DEVICE | Site: HIP | Status: FUNCTIONAL
Brand: REFLECTION

## 2023-03-06 RX ORDER — SODIUM CHLORIDE 9 MG/ML
INJECTION, SOLUTION INTRAVENOUS PRN
Status: DISCONTINUED | OUTPATIENT
Start: 2023-03-06 | End: 2023-03-06 | Stop reason: HOSPADM

## 2023-03-06 RX ORDER — PROPOFOL 10 MG/ML
INJECTION, EMULSION INTRAVENOUS PRN
Status: DISCONTINUED | OUTPATIENT
Start: 2023-03-06 | End: 2023-03-06 | Stop reason: SDUPTHER

## 2023-03-06 RX ORDER — FLUOXETINE HYDROCHLORIDE 20 MG/1
60 CAPSULE ORAL DAILY
Status: DISCONTINUED | OUTPATIENT
Start: 2023-03-06 | End: 2023-03-07 | Stop reason: HOSPADM

## 2023-03-06 RX ORDER — LIDOCAINE HYDROCHLORIDE 10 MG/ML
1 INJECTION, SOLUTION EPIDURAL; INFILTRATION; INTRACAUDAL; PERINEURAL
Status: DISCONTINUED | OUTPATIENT
Start: 2023-03-06 | End: 2023-03-06 | Stop reason: HOSPADM

## 2023-03-06 RX ORDER — SODIUM CHLORIDE, SODIUM LACTATE, POTASSIUM CHLORIDE, CALCIUM CHLORIDE 600; 310; 30; 20 MG/100ML; MG/100ML; MG/100ML; MG/100ML
INJECTION, SOLUTION INTRAVENOUS CONTINUOUS
Status: DISCONTINUED | OUTPATIENT
Start: 2023-03-06 | End: 2023-03-06 | Stop reason: HOSPADM

## 2023-03-06 RX ORDER — BUPIVACAINE HYDROCHLORIDE 5 MG/ML
INJECTION, SOLUTION EPIDURAL; INTRACAUDAL
Status: COMPLETED | OUTPATIENT
Start: 2023-03-06 | End: 2023-03-06

## 2023-03-06 RX ORDER — MAGNESIUM HYDROXIDE 1200 MG/15ML
LIQUID ORAL CONTINUOUS PRN
Status: COMPLETED | OUTPATIENT
Start: 2023-03-06 | End: 2023-03-06

## 2023-03-06 RX ORDER — SODIUM CHLORIDE 0.9 % (FLUSH) 0.9 %
5-40 SYRINGE (ML) INJECTION EVERY 12 HOURS SCHEDULED
Status: DISCONTINUED | OUTPATIENT
Start: 2023-03-06 | End: 2023-03-06 | Stop reason: HOSPADM

## 2023-03-06 RX ORDER — ONDANSETRON 2 MG/ML
4 INJECTION INTRAMUSCULAR; INTRAVENOUS
Status: DISCONTINUED | OUTPATIENT
Start: 2023-03-06 | End: 2023-03-06 | Stop reason: HOSPADM

## 2023-03-06 RX ORDER — VANCOMYCIN HYDROCHLORIDE 1 G/20ML
INJECTION, POWDER, LYOPHILIZED, FOR SOLUTION INTRAVENOUS PRN
Status: DISCONTINUED | OUTPATIENT
Start: 2023-03-06 | End: 2023-03-06 | Stop reason: ALTCHOICE

## 2023-03-06 RX ORDER — ACETAMINOPHEN 500 MG
1000 TABLET ORAL ONCE
Status: COMPLETED | OUTPATIENT
Start: 2023-03-06 | End: 2023-03-06

## 2023-03-06 RX ORDER — CEFAZOLIN SODIUM IN 0.9 % NACL 2 G/100 ML
2000 PLASTIC BAG, INJECTION (ML) INTRAVENOUS EVERY 8 HOURS
Status: COMPLETED | OUTPATIENT
Start: 2023-03-06 | End: 2023-03-06

## 2023-03-06 RX ORDER — ACETAMINOPHEN 325 MG/1
650 TABLET ORAL EVERY 6 HOURS
Status: DISCONTINUED | OUTPATIENT
Start: 2023-03-06 | End: 2023-03-07 | Stop reason: HOSPADM

## 2023-03-06 RX ORDER — OXYCODONE HYDROCHLORIDE 5 MG/1
10 TABLET ORAL EVERY 4 HOURS PRN
Status: DISCONTINUED | OUTPATIENT
Start: 2023-03-06 | End: 2023-03-07 | Stop reason: HOSPADM

## 2023-03-06 RX ORDER — SODIUM CHLORIDE 0.9 % (FLUSH) 0.9 %
5-40 SYRINGE (ML) INJECTION PRN
Status: DISCONTINUED | OUTPATIENT
Start: 2023-03-06 | End: 2023-03-06 | Stop reason: HOSPADM

## 2023-03-06 RX ORDER — MINOCYCLINE HYDROCHLORIDE 50 MG/1
100 CAPSULE ORAL 2 TIMES DAILY
Status: DISCONTINUED | OUTPATIENT
Start: 2023-03-06 | End: 2023-03-07 | Stop reason: HOSPADM

## 2023-03-06 RX ORDER — OXYCODONE HYDROCHLORIDE 5 MG/1
5 TABLET ORAL EVERY 4 HOURS PRN
Status: DISCONTINUED | OUTPATIENT
Start: 2023-03-06 | End: 2023-03-07 | Stop reason: HOSPADM

## 2023-03-06 RX ORDER — MIDAZOLAM HYDROCHLORIDE 1 MG/ML
2 INJECTION INTRAMUSCULAR; INTRAVENOUS ONCE
Status: DISCONTINUED | OUTPATIENT
Start: 2023-03-06 | End: 2023-03-06 | Stop reason: HOSPADM

## 2023-03-06 RX ORDER — DEXAMETHASONE SODIUM PHOSPHATE 4 MG/ML
INJECTION, SOLUTION INTRA-ARTICULAR; INTRALESIONAL; INTRAMUSCULAR; INTRAVENOUS; SOFT TISSUE PRN
Status: DISCONTINUED | OUTPATIENT
Start: 2023-03-06 | End: 2023-03-06 | Stop reason: SDUPTHER

## 2023-03-06 RX ORDER — SODIUM CHLORIDE 0.9 % (FLUSH) 0.9 %
5-40 SYRINGE (ML) INJECTION PRN
Status: DISCONTINUED | OUTPATIENT
Start: 2023-03-06 | End: 2023-03-07 | Stop reason: HOSPADM

## 2023-03-06 RX ORDER — ONDANSETRON 4 MG/1
4 TABLET, ORALLY DISINTEGRATING ORAL EVERY 8 HOURS PRN
Status: DISCONTINUED | OUTPATIENT
Start: 2023-03-06 | End: 2023-03-07 | Stop reason: HOSPADM

## 2023-03-06 RX ORDER — CEFAZOLIN SODIUM IN 0.9 % NACL 2 G/100 ML
2000 PLASTIC BAG, INJECTION (ML) INTRAVENOUS
Status: COMPLETED | OUTPATIENT
Start: 2023-03-06 | End: 2023-03-06

## 2023-03-06 RX ORDER — MAGNESIUM SULFATE IN WATER 40 MG/ML
2000 INJECTION, SOLUTION INTRAVENOUS ONCE
Status: COMPLETED | OUTPATIENT
Start: 2023-03-06 | End: 2023-03-06

## 2023-03-06 RX ORDER — SODIUM CHLORIDE 0.9 % (FLUSH) 0.9 %
5-40 SYRINGE (ML) INJECTION EVERY 12 HOURS SCHEDULED
Status: DISCONTINUED | OUTPATIENT
Start: 2023-03-06 | End: 2023-03-07 | Stop reason: HOSPADM

## 2023-03-06 RX ORDER — METHOCARBAMOL 500 MG/1
500 TABLET, FILM COATED ORAL 3 TIMES DAILY PRN
Status: DISCONTINUED | OUTPATIENT
Start: 2023-03-06 | End: 2023-03-07 | Stop reason: HOSPADM

## 2023-03-06 RX ORDER — ATORVASTATIN CALCIUM 10 MG/1
10 TABLET, FILM COATED ORAL NIGHTLY
Status: DISCONTINUED | OUTPATIENT
Start: 2023-03-07 | End: 2023-03-07 | Stop reason: HOSPADM

## 2023-03-06 RX ORDER — SODIUM CHLORIDE 9 MG/ML
INJECTION, SOLUTION INTRAVENOUS PRN
Status: DISCONTINUED | OUTPATIENT
Start: 2023-03-06 | End: 2023-03-07 | Stop reason: HOSPADM

## 2023-03-06 RX ORDER — ONDANSETRON 2 MG/ML
4 INJECTION INTRAMUSCULAR; INTRAVENOUS EVERY 6 HOURS PRN
Status: DISCONTINUED | OUTPATIENT
Start: 2023-03-06 | End: 2023-03-07 | Stop reason: HOSPADM

## 2023-03-06 RX ORDER — HYDROMORPHONE HCL 110MG/55ML
PATIENT CONTROLLED ANALGESIA SYRINGE INTRAVENOUS PRN
Status: DISCONTINUED | OUTPATIENT
Start: 2023-03-06 | End: 2023-03-06 | Stop reason: SDUPTHER

## 2023-03-06 RX ORDER — CELECOXIB 100 MG/1
400 CAPSULE ORAL ONCE
Status: COMPLETED | OUTPATIENT
Start: 2023-03-06 | End: 2023-03-06

## 2023-03-06 RX ORDER — MIDAZOLAM HYDROCHLORIDE 1 MG/ML
INJECTION INTRAMUSCULAR; INTRAVENOUS
Status: DISPENSED
Start: 2023-03-06 | End: 2023-03-06

## 2023-03-06 RX ORDER — ROCURONIUM BROMIDE 10 MG/ML
INJECTION, SOLUTION INTRAVENOUS PRN
Status: DISCONTINUED | OUTPATIENT
Start: 2023-03-06 | End: 2023-03-06 | Stop reason: SDUPTHER

## 2023-03-06 RX ORDER — ONDANSETRON 2 MG/ML
INJECTION INTRAMUSCULAR; INTRAVENOUS PRN
Status: DISCONTINUED | OUTPATIENT
Start: 2023-03-06 | End: 2023-03-06 | Stop reason: SDUPTHER

## 2023-03-06 RX ORDER — GLYCOPYRROLATE 0.2 MG/ML
INJECTION INTRAMUSCULAR; INTRAVENOUS PRN
Status: DISCONTINUED | OUTPATIENT
Start: 2023-03-06 | End: 2023-03-06 | Stop reason: SDUPTHER

## 2023-03-06 RX ORDER — LIDOCAINE HYDROCHLORIDE 20 MG/ML
INJECTION, SOLUTION INFILTRATION; PERINEURAL PRN
Status: DISCONTINUED | OUTPATIENT
Start: 2023-03-06 | End: 2023-03-06 | Stop reason: SDUPTHER

## 2023-03-06 RX ADMIN — ROCURONIUM BROMIDE 50 MG: 10 SOLUTION INTRAVENOUS at 07:35

## 2023-03-06 RX ADMIN — Medication 2000 MG: at 07:37

## 2023-03-06 RX ADMIN — ACETAMINOPHEN 325MG 650 MG: 325 TABLET ORAL at 21:00

## 2023-03-06 RX ADMIN — SODIUM CHLORIDE, SODIUM LACTATE, POTASSIUM CHLORIDE, AND CALCIUM CHLORIDE: .6; .31; .03; .02 INJECTION, SOLUTION INTRAVENOUS at 08:26

## 2023-03-06 RX ADMIN — SODIUM CHLORIDE: 9 INJECTION, SOLUTION INTRAVENOUS at 15:45

## 2023-03-06 RX ADMIN — HYDROMORPHONE HYDROCHLORIDE 0.5 MG: 0.5 INJECTION, SOLUTION INTRAMUSCULAR; INTRAVENOUS; SUBCUTANEOUS at 10:13

## 2023-03-06 RX ADMIN — PROPOFOL 150 MG: 10 INJECTION, EMULSION INTRAVENOUS at 07:35

## 2023-03-06 RX ADMIN — ROCURONIUM BROMIDE 10 MG: 10 SOLUTION INTRAVENOUS at 08:29

## 2023-03-06 RX ADMIN — GLYCOPYRROLATE 0.2 MG: 0.2 INJECTION, SOLUTION INTRAMUSCULAR; INTRAVENOUS at 08:08

## 2023-03-06 RX ADMIN — LIDOCAINE HYDROCHLORIDE 50 MG: 20 INJECTION, SOLUTION INFILTRATION; PERINEURAL at 07:35

## 2023-03-06 RX ADMIN — SUGAMMADEX 200 MG: 100 INJECTION, SOLUTION INTRAVENOUS at 09:36

## 2023-03-06 RX ADMIN — BUPIVACAINE HYDROCHLORIDE 20 ML: 5 INJECTION, SOLUTION EPIDURAL; INTRACAUDAL; PERINEURAL at 07:00

## 2023-03-06 RX ADMIN — DEXAMETHASONE SODIUM PHOSPHATE 4 MG: 4 INJECTION, SOLUTION INTRAMUSCULAR; INTRAVENOUS at 07:40

## 2023-03-06 RX ADMIN — MAGNESIUM SULFATE IN WATER 2000 MG: 40 INJECTION, SOLUTION INTRAVENOUS at 07:45

## 2023-03-06 RX ADMIN — HYDROMORPHONE HYDROCHLORIDE 1 MG: 2 INJECTION, SOLUTION INTRAMUSCULAR; INTRAVENOUS; SUBCUTANEOUS at 08:00

## 2023-03-06 RX ADMIN — ONDANSETRON 4 MG: 2 INJECTION INTRAMUSCULAR; INTRAVENOUS at 07:40

## 2023-03-06 RX ADMIN — HYDROMORPHONE HYDROCHLORIDE 1 MG: 2 INJECTION, SOLUTION INTRAMUSCULAR; INTRAVENOUS; SUBCUTANEOUS at 09:38

## 2023-03-06 RX ADMIN — Medication 2000 MG: at 15:46

## 2023-03-06 RX ADMIN — SODIUM CHLORIDE: 9 INJECTION, SOLUTION INTRAVENOUS at 23:12

## 2023-03-06 RX ADMIN — SODIUM CHLORIDE, SODIUM LACTATE, POTASSIUM CHLORIDE, AND CALCIUM CHLORIDE: .6; .31; .03; .02 INJECTION, SOLUTION INTRAVENOUS at 07:27

## 2023-03-06 RX ADMIN — Medication 20 ML: at 21:00

## 2023-03-06 RX ADMIN — Medication 2000 MG: at 23:13

## 2023-03-06 RX ADMIN — BUPIVACAINE HYDROCHLORIDE 20 ML: 5 INJECTION, SOLUTION EPIDURAL; INTRACAUDAL at 07:00

## 2023-03-06 RX ADMIN — ACETAMINOPHEN 325MG 650 MG: 325 TABLET ORAL at 15:40

## 2023-03-06 RX ADMIN — PROPOFOL 50 MG: 10 INJECTION, EMULSION INTRAVENOUS at 07:44

## 2023-03-06 RX ADMIN — FLUOXETINE 60 MG: 20 CAPSULE ORAL at 15:40

## 2023-03-06 RX ADMIN — CELECOXIB 400 MG: 100 CAPSULE ORAL at 06:30

## 2023-03-06 RX ADMIN — ACETAMINOPHEN 1000 MG: 500 TABLET ORAL at 06:30

## 2023-03-06 RX ADMIN — MINOCYCLINE HYDROCHLORIDE 100 MG: 50 CAPSULE ORAL at 21:00

## 2023-03-06 ASSESSMENT — PAIN DESCRIPTION - ORIENTATION
ORIENTATION: RIGHT

## 2023-03-06 ASSESSMENT — PAIN SCALES - GENERAL
PAINLEVEL_OUTOF10: 3
PAINLEVEL_OUTOF10: 3
PAINLEVEL_OUTOF10: 7
PAINLEVEL_OUTOF10: 0
PAINLEVEL_OUTOF10: 1
PAINLEVEL_OUTOF10: 3
PAINLEVEL_OUTOF10: 1

## 2023-03-06 ASSESSMENT — PAIN DESCRIPTION - PAIN TYPE: TYPE: CHRONIC PAIN

## 2023-03-06 ASSESSMENT — PAIN DESCRIPTION - LOCATION
LOCATION: HIP

## 2023-03-06 ASSESSMENT — PAIN DESCRIPTION - DESCRIPTORS
DESCRIPTORS: ACHING

## 2023-03-06 NOTE — PROGRESS NOTES
Occupational Therapy  Facility/Department: [facility] OR  Occupational Therapy Initial Assessment/Treatment    Name: Luz Marina Fitzpatrick  : 1965  MRN: [id number]  Date of Service: 3/6/2023    Discharge Recommendations:  24 hour supervision or assist          Patient Diagnosis(es): There were no encounter diagnoses.  Past Medical History:  has a past medical history of Arthritis, Depression, Hyperlipidemia, and Hypertension.  Past Surgical History:  has a past surgical history that includes cyst removal () and [place] tooth extraction.           Assessment   Performance deficits / Impairments: Decreased functional mobility ;Decreased balance;Decreased ADL status  Patient evaluated POD #0 s/p R anterolateral THR 3/6/23, patient CGA/SBA for functional/toilet transfers with [place] for LE dressing. Patient educated on safe car transfers with demo/verbalized understanding.After evaluation, pt found to be presenting with the above mentioned occupational performance deficits which are affecting participation in daily living skills. Pt would benefit from continued skilled occupational therapy to address ADLs, functional mobility, and safety while in acute care.    Prognosis: Good  Decision Making: Low Complexity  REQUIRES OT FOLLOW-UP: Yes  Activity Tolerance  Activity Tolerance: Patient Tolerated treatment well        Plan   Occupational Therapy Plan  Times Per Week: 1 more session     Restrictions  Restrictions/Precautions  Restrictions/Precautions: Fall Risk, Weight Bearing, ROM Restrictions  Lower Extremity Weight Bearing Restrictions  Right Lower Extremity Weight Bearing: Weight Bearing As Tolerated  Position Activity Restriction  Hip Precautions: No hip extension, No hip external rotation, No active ABduction  Other position/activity restrictions: Day of Surgery Orders- 1) Up to bedside evening of surgery 2) Bathroom privileges with assistance    Subjective   General  Chart Reviewed: Yes, Orders, Progress Notes,  History and Physical, Operative Notes  Patient assessed for rehabilitation services?: Yes  Family / Caregiver Present: No  Referring Practitioner: Mojgan Escobar MD 3/06/23  Diagnosis: R hip OA, s/ R anterolateral THR 3/06/23  Subjective  Subjective: Pt pleasant and agreeable to OT evaluation, states feeling okay. \"I think I'm staying all night, Dr. Sisi Luciano says I'll have better outcomes\"  Pain: 4/10 R hip -ice applied at end of session for non-pharm pain mgmt. Social/Functional History  Social/Functional History  Lives With: Family (spouse (14 yr old and 21 yrs old))  Type of Home: House  Home Layout: One level  Home Access: Stairs to enter without rails  Entrance Stairs - Number of Steps: 2 DANNIE  Bathroom Shower/Tub: Tub/Shower unit  Bathroom Toilet: Handicap height  Bathroom Equipment: Shower chair, Grab bars in shower, Toilet raiser  Home Equipment: Walker, rolling  Has the patient had two or more falls in the past year or any fall with injury in the past year?: No  ADL Assistance: Independent  Homemaking Assistance: Independent  Ambulation Assistance: Independent  Transfer Assistance: Independent  Active : Yes  Occupation: Retired  Type of Occupation: special education       Objective   Heart Rate: 85  [facility]: Monitor  BP: 134/73  BP Location: Left upper arm  BP Method: Automatic  Patient Position: Semi fowlers  SpO2: 93 %  O2 Device: None (Room air)             Safety Devices  Type of Devices: Gait belt;Call light within reach; Left in bed;Nurse notified    Bed Mobility Training  Bed Mobility Training: Yes  Supine to Sit: Stand-by assistance  Sit to Supine: Other (comment) (EOB at end of session)    Balance  Sitting: Intact  Standing: Impaired  Standing - Static: Fair;Constant support (with RW for UE support)  Standing - Dynamic: Fair;Constant support    Transfer Training  Transfer Training: Yes  Sit to Stand: Contact-guard assistance  Stand to Sit: Stand-by assistance  Toilet Transfer: Stand-by assistance (with R grab bar)    Car Transfer:   Patient Educated in safety with car transfers and  dispensed instruction on car transfers with use of assistive device with patient demonstrating:  [x] Verbalizing understanding of appropriate technique, maintaining any ordered precautions for car transfer training s/p TJR  [x] [place] with simulated car transfer with walker    [x] Other: Educated patient in written car transfer instruction with patient verbalizing understanding of appropriate techniques.    Gait  Overall Level of Assistance: Stand-by assistance  Interventions: Safety awareness training;Verbal cues;Weight shifting training/pressure relief  Assistive Device: Gait belt;Walker, rolling     AROM: Within functional limits  PROM: Within functional limits  Strength: Within functional limits  Coordination: Within functional limits  Tone: Normal  Sensation: Intact    ADL  Feeding: Setup;Beverage management  LE Dressing: Minimal assistance (briefs)  Toileting: Stand by assistance      Vision  Vision: Impaired  Vision Exceptions: Wears glasses at all times  Hearing  Hearing: Within functional limits  Cognition  Overall Cognitive Status: WFL  Orientation  Overall Orientation Status: Within Functional Limits      Education Given To: Patient  Education Provided: Role of Therapy;ADL Adaptive Strategies;Fall Prevention Strategies;Plan of Care;Transfer Training;Equipment;Precautions  Education Provided Comments: Disease specific: In veronica of hip precautions/wb'ing restrictions, educated patient on compensatory methods for LE ADLs. Pt verbalized understanding.   Education Method: Demonstration;Verbal  Barriers to Learning: None  Education Outcome: Demonstrated understanding;Verbalized understanding       AM-PAC Score        AM-[facility] Inpatient Daily Activity Raw Score: 19 (03/06/23 1322)  AM-PAC Inpatient ADL T-Scale Score : 40.22 (03/06/23 1322)  ADL Inpatient CMS 0-100% Score: 42.8 (03/06/23 1322)  ADL Inpatient CMS  G-Code Modifier : CK (03/06/23 6233)         Goals  Short Term Goals  Time Frame for Short Term Goals: 3 days 3/09  Short Term Goal 1: Pt will complete LE dressing with SBA  Short Term Goal 2: Pt will complete toilet transfers with SBA-STG met 3/06  Short Term Goal 3: Pt will verbalize understanding of safe car transfer after education-stg met 3/06  Patient Goals   Patient goals : \"to be able to get on/off toilet without you helping (SBA)\"-STG met 3/06       Therapy Time   Individual Concurrent Group Co-treatment   Time In 1120         Time Out 1155         Minutes 35         Timed Code Treatment Minutes: 25 Minutes       Sendy Gutierrez OTR/L  If pt is unable to be seen after this session, please let this note serve as discharge summary. Please see case management note for discharge disposition. Thank you.

## 2023-03-06 NOTE — PROGRESS NOTES
4 Eyes Skin Assessment     The patient is being assess for   Admission    I agree that 2 RN's have performed a thorough Head to Toe Skin Assessment on the patient. ALL assessment sites listed below have been assessed. Areas assessed for pressure by both nurses:   [x]   Head, Face, and Ears   [x]   Shoulders, Back, and Chest, Abdomen  [x]   Arms, Elbows, and Hands   [x]   Coccyx, Sacrum, and Ischium  [x]   Legs, Feet, and Heels        Skin Assessed Under all Medical Devices by both nurses:  NA               All Mepilex Borders were peeled back and area peeked at by both nurses:  Yes  Please list where Mepilex Borders are located:  Back             **SHARE this note so that the co-signing nurse is able to place an eSignature**    Co-signer eSignature: {Esignature:[id number]}    Does the Patient have Skin Breakdown related to pressure?   No     (Insert Photo here)         Donte Prevention initiated:  No   Wound Care Orders initiated:  No      WOC nurse consulted for Pressure Injury (Stage 3,4, Unstageable, DTI, NWPT, Complex wounds)and New or Established Ostomies:  No      Primary Nurse eSignature: Electronically signed by Anahi Gautam RN on 3/6/23 at 4:45 PM EST

## 2023-03-06 NOTE — DISCHARGE INSTRUCTIONS
*** Please contact [address] with any questions or concerns after your discharge. *** Mon- Fri 9am- 5pm [phone]. If you have any issues or concerns after 5pm or on the weekend please call your surgeon's office. I will be contacting you in a few days to follow up. If you need a pain medication refill please contact your surgeon's office. Total Hip &Bipolar Replacement  Discharge Instructions    To prevent Clot formation, you have been placed on the following medication:  Aspirin 325mg daily for 4 weeks post op  Surgical Site Care:  Dressing change every 5-7 days with mepilex dressing. Can be changed at home until incision healed  If you have staples or sutures, these will be removed on or around post-operative day 10-12 at the office or by your care team and steri-strips applied  If you have glue, this will be removed in the office or gradually wear off as your incision heals  Showering is permitted if waterproof mepilex dressing is applied or when staples are removed and all areas of incision are healed. Physical Therapy:  Weight Bearing Status:     Weight bearing as tolerated  Continue your Physical Therapy activities as instructed. If any questions contact your outpatient physical therapy office, your home health care physical therapist (if you have this arranged), or contact your Nurse Navigator to help you in reaching the appropriate person. Precautions  Per Physical Therapy handout  Pain Medications  You were given oxycodone (Oxycontin, Oxyir)  Wean off pain medications as you deem appropriate as long as pain is under control  Be sure to drink plenty of fluids (recommend water) while taking narcotic pain medications to prevent constipation  You may take an over the counter laxative or stool softener as needed to prevent/treat constipation as well, we recommend Senokot S OTC.   We recommend that you consider taking these medications the entire time you are taking pain medication. Cold packs/Ice packs/Machine  May be used as much as necessary to reduce swelling/inflammation/soreness  Be sure to have a barrier (cloth, clothing, towel) between your skin/incision and the ice pack to prevent frostbite  Contact [facility]'s office or your Nurse Navigator if:  Increased redness, swelling, drainage of any kind, and/or pain to surgery site. As well as new onset fevers and or chills. These could signify an infection. Calf or thigh tenderness to touch as well as increased swelling or redness. This could signify a clot formation. Numbness or tingling to an area around the incision site or below the incision site (toes). Any rash appears, increased  or new onset nausea/vomiting occur. This may indicate a reaction to a medication. Phone # [phone] - [phone] [facility]. Follow up with Surgeon at scheduled appointment time.    I acknowledge that I have received erin hose and understand the instructions on how and when to wear them   __________________________________  Discharging RN who has gone over instructions and acknowledges erin hose have been received   ____________________________________________  Please remove [employer] wrist band on Post Operative Day #4

## 2023-03-06 NOTE — PROGRESS NOTES
Physical Therapy  Facility/Department: [facility] C5 - MED SURG/ORTHO  Physical Therapy Initial Assessment/Treatment     Name: Sonia Wells  : 1965  MRN: [id number]  Date of Service: 3/6/2023    Discharge Recommendations:  24 hour supervision or assist   PT Equipment Recommendations  Equipment Needed: No  Other: Has RW      Patient Diagnosis(es): There were no encounter diagnoses. Past Medical History:  has a past medical history of Arthritis, Depression, Hyperlipidemia, and Hypertension. Past Surgical History:  has a past surgical history that includes cyst removal () and [place] tooth extraction. Assessment   Body Structures, Functions, Activity Limitations Requiring Skilled Therapeutic Intervention: Decreased functional mobility ; Decreased ROM; Decreased strength;Decreased balance;Decreased endurance  Assessment: Pt to [facility] for R BRIANA on 3/06/23. PTA pt lives with family in single story house with 2 DANNIE. Pt was indepdnent with transfers and ambulation with no AD. Pt currently able to complete bed mobility and trnasfer with SBA, ambulate with CGA to SBA, and navigate up<>down 1 curb step with CGA. Pt demos safe mobility and ability to maintain BRIANA precautions while completing functional mobility. Pt is safe to DC home with 24/ assist when deemed medically appropraite.   Therapy Prognosis: Good  Decision Making: Low Complexity  Barriers to Learning: None  Requires PT Follow-Up: Yes  Activity Tolerance  Activity Tolerance: Patient tolerated evaluation without incident;Patient tolerated treatment well     Plan   Physcial Therapy Plan  General Plan: 2 times a day 7 days a week  Current Treatment Recommendations: Strengthening, ROM, Balance training, Gait training, Safety education & training, Therapeutic activities, Home exercise program, Stair training, Patient/Caregiver education & training, Transfer training, Functional mobility training, Neuromuscular re-education, Endurance training, Pain management, Positioning  Safety Devices  Type of Devices: Gait belt, Call light within reach, Left in bed, Nurse notified  Restraints  Restraints Initially in Place: No     Restrictions  Restrictions/Precautions  Restrictions/Precautions: Fall Risk, Weight Bearing, ROM Restrictions  Lower Extremity Weight Bearing Restrictions  Right Lower Extremity Weight Bearing: Weight Bearing As Tolerated  Position Activity Restriction  Hip Precautions: No hip extension, No hip external rotation, No active ABduction  Other position/activity restrictions: Day of Surgery Orders- 1) Up to bedside evening of surgery 2) Bathroom privileges with assistance     Subjective   Pain: 4/10 R joaquin  General  Chart Reviewed: Yes  Patient assessed for rehabilitation services?: Yes  Response To Previous Treatment: Not applicable  Family / Caregiver Present: No  Referring Practitioner: Seema Galaviz MD  Referral Date : 03/06/23  Diagnosis: R BRIANA  Follows Commands: Within Functional Limits  General Comment  Comments: Pt sitting at EOB with OT upon arrival  Subjective  Subjective: pt pleasant and agreeable to therapy eval         Social/Functional History  Social/Functional History  Lives With: Family (spouse (16 yr old and 21 yrs old))  Type of Home: [address] [place], [place] [address],[address]: One level  Home Access: Stairs to enter without rails  Entrance Stairs - Number of Steps: 2 DANNIE  Bathroom Shower/Tub: Tub/Shower unit  Bathroom Toilet: Handicap height  Bathroom Equipment: Shower chair, Grab bars in shower, Toilet raiser  Home Equipment: Areta Narachel, rolling  Has the patient had two or more falls in the past year or any fall with injury in the past year?: No  ADL Assistance: Independent  Homemaking Assistance: Independent  Ambulation Assistance: Independent  Transfer Assistance: Independent  Active : Yes  Occupation: Retired  Type of Occupation: special education    Vision/Hearing  Vision  Vision: Impaired  Vision Exceptions: Wears glasses at all times  Hearing  Hearing: Within functional limits      Cognition   Orientation  Overall Orientation Status: Within Functional Limits  Cognition  Overall Cognitive Status: WFL     Objective   Heart Rate: 82  Heart Rate Source: Monitor  BP: 121/80  BP Location: Right upper arm  BP Method: Automatic  Patient Position: Semi fowlers  MAP (Calculated): 94  Resp: 18  SpO2: 98 %  O2 Device: None (Room air)     Observation/Palpation  Posture: Good  Gross Assessment  AROM: Generally decreased, functional  PROM: Generally decreased, functional  Strength: Generally decreased, functional     Bed Mobility Training  Bed Mobility Training: Yes  Supine to Sit: Other (comment) (Sitting EOB upon arrival)  Sit to Supine: Stand-by assistance (With HOB flat)  Scooting: Supervision  Balance  Sitting: Intact  Standing: Impaired (With RW)  Standing - Static: Fair;Constant support  Standing - Dynamic: Fair;Constant support  Transfer Training  Transfer Training: Yes  Sit to Stand: Contact-guard assistance  Stand to Sit: Stand-by assistance  Toilet Transfer: Stand-by assistance (with R grab bar)  Car Transfer: Other (comment)  Gait Training  Gait Training: Yes  Gait  Overall Level of Assistance: Stand-by assistance  Interventions: Safety awareness training;Verbal cues; Weight shifting training/pressure relief (Cues for walker managment and sequencing)  Base of Support: Shift to left  Speed/Cristel: Pace decreased (< 100 feet/min); Slow  Step Length: Left shortened  Stance: Left increased  Gait Abnormalities: Step to gait  Distance (ft): 50 Feet  Assistive Device: Gait belt;Walker, rolling  Curbs/Ramps: Contact-guard assistance (1 curb step with RW with cues for sequencing)  Wheelchair Management  Wheelchair Management: No     Exercise Treatment: 5-10x BRIANA exericses        OutComes Score  AM-PAC Score  AM-PAC Inpatient Mobility Raw Score : 19 (03/06/23 1531)  AM-PAC Inpatient T-Scale Score : 45.44 (03/06/23 1531)  Mobility Inpatient CMS 0-100% Score: 41.77 (03/06/23 1531)  Mobility Inpatient CMS G-Code Modifier : CK (03/06/23 1531)    Goals  Short Term Goals  Time Frame for Short Term Goals: 3/08/23  Short Term Goal 1: pt will complete bed mobility with mod I  Short Term Goal 2: pt will complete transfers with supervision  Short Term Goal 3: pt will ambulate 100ft with LRAD and supervision  Short Term Goal 4: pt will navigate up<>down 2 steps with no HR and LRAD with SBA  Short Term Goal 5: pt will participate in 12-15 reps of [facility] exericses by 3/07/23  Additional Goals?: No  Patient Goals   Patient Goals : \"I want to walk with my walker with only a little help\" -- MET 3/06       Education  Patient Education  Education Given To: Patient  Education Provided: Plan of Care;Role of Therapy;Home Exercise Program;Precautions; Equipment;Transfer Training  Education Method: Verbal;Printed Information/Hand-outs;Demonstration  Barriers to Learning: None  Education Outcome: Verbalized understanding;Demonstrated understanding      Therapy Time   Individual Concurrent Group Co-treatment   Time In 1159         Time Out 1219         Minutes 20         Timed Code Treatment Minutes: 10 Minutes (10 min eval)        Wolfgang Nicole, PT, DPT    If pt is unable to be seen after this session, please let this note serve as discharge summary. Please see case management note for discharge disposition. Thank you.

## 2023-03-06 NOTE — INTERVAL H&P NOTE
Update History & Physical    The patient's History and Physical of February 22, 2023 was reviewed with the patient and I examined the patient. There was no change. The surgical site was confirmed by the patient and me. Plan: The risks, benefits, expected outcome, and alternative to the recommended procedure have been discussed with the patient. Patient understands and wants to proceed with the procedure. Plan R BRIANA today.     Electronically signed by Cary Loya MD on 3/6/2023 at 7:01 AM

## 2023-03-06 NOTE — DISCHARGE INSTR - COC
Continuity of Care Form    Patient Name: Natalie Fernandez   :  1965  MRN:  [id number]    Admit date:  3/6/2023  Discharge date:  3/7/23      Code Status Order: Full Code   Advance Directives:   [facility] Documentation       Date/Time Healthcare Directive Type of Healthcare Directive Copy in [address] Agent's Name Healthcare Agent's Phone Number    [phone] No, patient does not have an advance directive for healthcare treatment -- -- -- -- --            Admitting Physician:  Ortiz Treadwell MD  PCP: Gene Madrigal MD    Discharging Nurse: Shari Greco 23 Unit/Room#: [address] [facility]  Discharging Unit Phone Number: [phone]    Emergency Contact:   Extended Emergency Contact Information  Primary Emergency Contact: Corie Jeffries  Address: [address], [address] [place] [address] [address] Phone: [phone]  Work Phone: [phone]  Relation: Spouse    Past Surgical History:  Past Surgical History:   Procedure Laterality Date    CYST REMOVAL      from head not residual problems, MULTIPLE    WISDOM TOOTH EXTRACTION         Immunization History:   Immunization History   Administered Date(s) Administered    COVID-19, MODERNA Bivalent BOOSTER, (age 12y+), IM, 48 mcg/0.5 mL 2023    COVID-19, PFIZER PURPLE top, DILUTE for use, (age 15 y+), 30mcg/0.3mL 2021, 2021, 2022    Influenza A (L0Z3-28) Vaccine Nasal 12/15/2009    Tdap (Boostrix, Adacel) 2020    Zoster Recombinant (Shingrix) 2020       Active Problems:  Patient Active Problem List   Diagnosis Code    HTN (hypertension) I10    Depression F32. A    Mixed hyperlipidemia E78.2    Chronic dermatitis L30.9    Family history of melanoma Z80.8    Postmenopausal Z78.0    Trochanteric bursitis of right hip M70.61    Closed displaced fracture of base of fifth metacarpal bone of right hand S62.316A    Mucous cyst of finger M67.449    Observation after surgery Z48.89       Isolation/Infection:   Isolation            No Isolation          Patient Infection Status       None to display            Nurse Assessment:  Last Vital Signs: /61   Pulse 85   Temp 97.7 °F (36.5 °C)   Resp 18   Ht 5' 2\" (1.575 m)   Wt 219 lb 8 oz (99.6 kg)   LMP 01/23/2011   SpO2 94%   BMI 40.15 kg/m²     Last documented pain score (0-10 scale): Pain Level: 7  Last Weight:   Wt Readings from Last 1 Encounters:   03/06/23 219 lb 8 oz (99.6 kg)     Mental Status:  oriented    IV Access:  - None    Nursing Mobility/ADLs:  Walking   Assisted  Transfer  Assisted  Bathing  Assisted  Dressing  Assisted  Toileting  Assisted  Feeding  Independent  Med Admin  Independent  Med Delivery   whole    Wound Care Documentation and Therapy:  Incision 03/06/23 Hip Right;Lateral (Active)   Dressing Status Clean;Dry; Intact 03/06/23 0934   Dressing/Treatment Other (comment) 03/06/23 0934   Closure Sutures 03/06/23 0934   Margins Approximated 03/06/23 0934   Number of days: 0        Elimination:  Continence: Bowel: Yes  Bladder: Yes  Urinary Catheter: None   Colostomy/Ileostomy/Ileal Conduit: No       Date of Last BM: ***    Intake/Output Summary (Last 24 hours) at 3/6/2023 1304  Last data filed at 3/6/2023 0826  Gross per 24 hour   Intake 1000 ml   Output --   Net 1000 ml     No intake/output data recorded. Safety Concerns: At Risk for Falls    Impairments/Disabilities:      None    Nutrition Therapy:  Current Nutrition Therapy:   - Oral Diet:  General    Routes of Feeding: Oral  Liquids: Thin Liquids  Daily Fluid Restriction: no  Last Modified Barium Swallow with Video (Video Swallowing Test): not done    Treatments at the Time of Hospital Discharge:   Respiratory Treatments: ***  Oxygen Therapy:  is not on home oxygen therapy.   Ventilator:    - No ventilator support    Rehab Therapies: Physical Therapy and Occupational Therapy; HRS if patient agreeable  Weight Bearing Status/Restrictions: No weight bearing restrictions  Other Medical Equipment (for information only, NOT a DME order):  walker  Other Treatments: [facility]: LEVEL 1 [address] agency to establish plan of care for patient over 60 day period   Nursing  Initial home SN evaluation visit to occur within 24-48 hours for:  1)  medication management  2)  VS and clinical assessment  3)  S&S chronic disease exacerbation education + when to contact MD/NP  4)  care coordination  Medication Reconciliation during 1st SN visit    PT/OT   Evaluate with goal of regaining prior level of functioning   OT to evaluate if patient has [address] needs for personal care    PCP Visit scheduled within 7 days of hospital discharge   Telehealth-Homecare Vitals(If patient is agreeable and meets guidelines)      Patient's personal belongings (please select all that are sent with patient):  None    RN SIGNATURE:  Electronically signed by Greta Bliss RN on 3/7/23 at 3:02 PM EST    CASE MANAGEMENT/SOCIAL WORK SECTION    Inpatient Status Date: 3/6/23    Readmission Risk Assessment Score:  Readmission Risk              Risk of Unplanned Readmission:  0           Discharging to Facility/ Agency   Name:  [facility] care    Address: [address] [place] [address], [address] [place] [address]  Phone: [phone]  Fax: [phone]    / signature: Electronically signed by Celso Khanna RN on 3/7/23 at 1:24 PM EST    PHYSICIAN SECTION    Prognosis: Good    Condition at Discharge: Stable    Rehab Potential (if transferring to Rehab): Good    Recommended Labs or Other Treatments After Discharge: PT/OT WBAT to the LLE with anterolateral hip precautions. Follow up with Dr Eladia Hodge as scheduled.     Physician Certification: I certify the above information and transfer of Elva Johnson  is necessary for the continuing treatment of the diagnosis listed and that she requires Home Care for less 30 days.     Update Admission H&P: No change in H&P    PHYSICIAN SIGNATURE:  Electronically signed by Andrew Merritt PA-C  on 3/7/23 at 1:22 PM EST

## 2023-03-06 NOTE — ANESTHESIA PRE PROCEDURE
Department of Anesthesiology  Preprocedure Note       Name:  Kortney Solis   Age:  65 y.o.  :  1965                                          MRN:  [id number]         Date:  3/6/2023      Surgeon: Praveen Conklin):  Laurence Murphy MD    Procedure: Procedure(s):  RIGHT PRIMARY TOTAL HIP ARTHROPLASTY              MCKENZIE & NEPHEW NOTE: BLOCK    Medications prior to admission:   Prior to Admission medications    Medication Sig Start Date End Date Taking?  Authorizing Provider   acetaminophen (TYLENOL) 500 MG tablet Take 1,000 mg by mouth every 6 hours as needed for Pain   Yes Historical Provider, MD   FLUoxetine (PROZAC) 20 MG capsule TAKE THREE CAPSULES BY MOUTH DAILY 23   NAVIN Fowler CNP   methocarbamol (ROBAXIN) 500 MG tablet Take 1-2 tablets by mouth 3 times daily as needed (back pain) 23   Keven Lemon DO   atorvastatin (LIPITOR) 10 MG tablet TAKE ONE TABLET BY MOUTH DAILY 22   Leonard Girard MD   lisinopril (PRINIVIL;ZESTRIL) 20 MG tablet TAKE ONE TABLET BY MOUTH DAILY  Patient taking differently: Take 20 mg by mouth Daily with lunch TAKE ONE TABLET BY MOUTH DAILY 22   Leonard Girard MD   diclofenac sodium (VOLTAREN) 1 % GEL APPLY 2-4 GRAMS TOPICALLY FOUR TIMES A DAY AS NEEDED FOR PAIN 22   Leonard Girard MD   Cholecalciferol (VITAMIN D) 50 MCG (2000 UT) CAPS capsule Take 2,000 Units by mouth daily    Historical Provider, MD   Naproxen Sodium 220 MG CAPS Take by mouth as needed for Pain    Historical Provider, MD   minocycline (MINOCIN;DYNACIN) 100 MG capsule 2 capsules daily  11/30/15   Historical Provider, MD       Current medications:    Current Facility-Administered Medications   Medication Dose Route Frequency Provider Last Rate Last Admin    lidocaine PF 1 % injection 1 mL  1 mL IntraDERmal Once PRN Ana Paula Gunter MD        lactated ringers IV soln infusion   IntraVENous Continuous Ana Paula Gunter MD        sodium chloride flush 0.9 % injection 5-40 mL  5-40 mL IntraVENous 2 times per day George Brunson MD        sodium chloride flush 0.9 % injection 5-40 mL  5-40 mL IntraVENous PRN George Brunson MD        0.9 % sodium chloride infusion   IntraVENous PRN George Brunson MD        magnesium sulfate 2000 mg in 50 mL IVPB premix  2,000 mg IntraVENous Once George Brunson MD        ceFAZolin (ANCEF) 2000 mg in 0.9% sodium chloride 100 mL IVPB  2,000 mg IntraVENous On Call to  Pratik Parr MD        ortho mix injection   Injection On Call Beck Salazar MD           Allergies:  No Known Allergies    Problem List:    Patient Active Problem List   Diagnosis Code    HTN (hypertension) I10    Depression F32. A    Mixed hyperlipidemia E78.2    Chronic dermatitis L30.9    Family history of melanoma Z80.8    Postmenopausal Z78.0    Trochanteric bursitis of right hip M70.61    Closed displaced fracture of base of fifth metacarpal bone of right hand S62.316A    Mucous cyst of finger M67.449       Past Medical History:        Diagnosis Date    Arthritis     Depression     Hyperlipidemia     Hypertension        Past Surgical History:        Procedure Laterality Date    CYST REMOVAL  2003    from head not residual problems, MULTIPLE    WISDOM TOOTH EXTRACTION         Social History:    Social History     Tobacco Use    Smoking status: Never    Smokeless tobacco: Never   Substance Use Topics    Alcohol use:  No                                Counseling given: Not Answered      Vital Signs (Current):   Vitals:    03/01/23 1324 03/06/23 0600   BP:  134/70   Pulse:  91   Resp:  16   Temp:  98.7 °F (37.1 °C)   TempSrc:  Temporal   SpO2:  98%   Weight: 214 lb (97.1 kg) 219 lb 8 oz (99.6 kg)   Height: 5' 2\" (1.575 m) 5' 2\" (1.575 m)                                              BP Readings from Last 3 Encounters:   03/06/23 134/70   02/13/23 130/76   11/11/22 114/78       NPO Status: Time of last liquid consumption: 2345                        Time of last solid consumption: 2345                        Date of last liquid consumption: 03/05/23                        Date of last solid food consumption: 03/05/23    BMI:   Wt Readings from Last 3 Encounters:   03/06/23 219 lb 8 oz (99.6 kg)   02/13/23 214 lb 3.2 oz (97.2 kg)   12/16/22 215 lb (97.5 kg)     Body mass index is 40.15 kg/m².     CBC:   Lab Results   Component Value Date/Time    WBC 7.3 02/13/2023 02:47 PM    RBC 4.52 02/13/2023 02:47 PM    HGB 13.1 02/13/2023 02:47 PM    HCT 40.6 02/13/2023 02:47 PM    MCV 89.7 02/13/2023 02:47 PM    RDW 14.0 02/13/2023 02:47 PM     02/13/2023 02:47 PM       CMP:   Lab Results   Component Value Date/Time     02/13/2023 02:47 PM    K 4.8 02/13/2023 02:47 PM     02/13/2023 02:47 PM    CO2 27 02/13/2023 02:47 PM    BUN 21 02/13/2023 02:47 PM    CREATININE 0.7 02/13/2023 02:47 PM    GFRAA >60 06/22/2022 11:49 AM    AGRATIO 1.8 06/22/2022 11:49 AM    LABGLOM >60 02/13/2023 02:47 PM    GLUCOSE 82 02/13/2023 02:47 PM    PROT 7.3 06/22/2022 11:49 AM    CALCIUM 10.2 02/13/2023 02:47 PM    BILITOT 0.4 06/22/2022 11:49 AM    ALKPHOS 111 06/22/2022 11:49 AM    AST 16 06/22/2022 11:49 AM    ALT 17 06/22/2022 11:49 AM       POC Tests:   Recent Labs     03/06/23  0633   POCGLU 98       Coags:   Lab Results   Component Value Date/Time    PROTIME 13.9 02/13/2023 02:47 PM    INR 1.08 02/13/2023 02:47 PM    APTT 30.7 03/06/2023 06:20 AM       HCG (If Applicable): No results found for: PREGTESTUR, PREGSERUM, HCG, HCGQUANT     ABGs: No results found for: PHART, PO2ART, KMS0LSU, AEB1AHG, BEART, R6NSASSS     Type & Screen (If Applicable):  No results found for: LABABO, LABRH    Drug/Infectious Status (If Applicable):  No results found for: HIV, HEPCAB    COVID-19 Screening (If Applicable): No results found for: COVID19        Anesthesia Evaluation    Airway: Mallampati: I  TM distance: >3 FB   Neck ROM: full  Mouth opening: > = 3 FB   Dental: normal exam         Pulmonary: breath sounds clear to auscultation                             Cardiovascular:    (+) murmur: Grade 1,         Rhythm: regular  Rate: normal                    Neuro/Psych:               GI/Hepatic/Renal:             Endo/Other:                     Abdominal:             Vascular:          Other Findings:           Anesthesia Plan      general and regional     ASA 2     (Pt agrees to risks. Benefits and options of GETA/regional.  Questions answered.  Willing to proceed.)  Induction: intravenous.      Anesthetic plan and risks discussed with patient.              Post-op pain plan if not by surgeon: regional            Romero Patino MD   3/6/2023

## 2023-03-06 NOTE — ANESTHESIA POSTPROCEDURE EVALUATION
Department of Anesthesiology  Postprocedure Note    Patient: Elmer Jon  MRN: [id number]  YOB: 1965  Date of evaluation: 3/6/2023      Procedure Summary     Date: 03/06/23 Room / Location: [facility]    Anesthesia Start: 9800 Anesthesia Stop: 72 [phone]    Procedure: RIGHT PRIMARY TOTAL HIP ARTHROPLASTY              MCKENZIE & NEPHEW  (Right: Hip) Diagnosis:       Primary osteoarthritis of right hip      (RIGHT HIP OSTEOARTHRITIS)    Surgeons: Alejandra Thorne MD Responsible Provider: Bharati Arellano MD    Anesthesia Type: general, regional ASA Status: 2          Anesthesia Type: No value filed.     Alan Phase I: Alan Score: 10    Alan Phase II:        Anesthesia Post Evaluation    Patient location during evaluation: PACU  Patient participation: complete - patient participated  Level of consciousness: awake and alert  Pain score: 0  Airway patency: patent  Nausea & Vomiting: no nausea and no vomiting  Complications: no  Cardiovascular status: blood pressure returned to baseline  Respiratory status: acceptable  Hydration status: stable  Multimodal analgesia pain management approach

## 2023-03-06 NOTE — ANESTHESIA PROCEDURE NOTES
Peripheral Block    Patient location during procedure: holding area  Reason for block: post-op pain management and at surgeon's request  Start time: 3/6/2023 7:00 AM  End time: 3/6/2023 7:15 AM  Staffing  Anesthesiologist: Frank Padgett MD  Preanesthetic Checklist  Completed: patient identified, IV checked, site marked, risks and benefits discussed, surgical/procedural consents, equipment checked, timeout performed and anesthesia consent given  Peripheral Block   Patient position: supine  Prep: ChloraPrep  Provider prep: sterile gloves  Patient monitoring: responsive to questions  Block type: PENG  Laterality: right  Injection technique: single-shot  Guidance: ultrasound guided    Needle   Needle type: short-bevel   Needle gauge: 21 G  Needle localization: ultrasound guidance  Needle length: 10 cm  Assessment   Injection assessment: negative aspiration for heme  Paresthesia pain: none  Slow fractionated injection: yes  Hemodynamics: stable  Real-time US image taken/store: yes  Outcomes: patient tolerated procedure well    Medications Administered  bupivacaine (PF) 0.5 % - Perineural   20 mL - 3/6/2023 7:00:00 AM

## 2023-03-06 NOTE — ANESTHESIA PROCEDURE NOTES
Peripheral Block    Patient location during procedure: holding area  Reason for block: post-op pain management and at surgeon's request  Start time: 3/6/2023 7:00 AM  End time: 3/6/2023 7:15 AM  Staffing  Anesthesiologist: Romero Patino MD  Preanesthetic Checklist  Completed: patient identified, IV checked, risks and benefits discussed, pre-op evaluation, timeout performed, anesthesia consent given and oxygen available  Peripheral Block   Patient position: supine  Prep: ChloraPrep  Provider prep: sterile gloves  Block type: PENG  Laterality: right  Injection technique: single-shot  Guidance: ultrasound guided    Needle   Needle type: short-bevel   Needle gauge: 21 G  Needle localization: ultrasound guidance  Needle length: 10 cm  Assessment   Injection assessment: negative aspiration for heme, no paresthesia on injection, local visualized surrounding nerve on ultrasound and no intravascular symptoms  Paresthesia pain: none  Slow fractionated injection: yes  Hemodynamics: stable  Real-time US image taken/store: yes  Outcomes: patient tolerated procedure well    Medications Administered  bupivacaine (PF) 0.5 % - Perineural   20 mL - 3/6/2023 7:00:00 AM         arm pain/injury

## 2023-03-06 NOTE — ANESTHESIA POSTPROCEDURE EVALUATION
Department of Anesthesiology  Postprocedure Note    Patient: Samanta Lai  MRN: [id number]  YOB: 1965  Date of evaluation: 3/6/2023      Procedure Summary     Date: 03/06/23 Room / Location: [facility]    Anesthesia Start: 2723 Anesthesia Stop: [phone]    Procedure: RIGHT PRIMARY TOTAL HIP ARTHROPLASTY              [employer]  (Right: Hip) Diagnosis:       Primary osteoarthritis of right hip      (RIGHT HIP OSTEOARTHRITIS)    Surgeons: Siva Johnston MD Responsible Provider: Skylar Amado MD    Anesthesia Type: general, regional ASA Status: 2          Anesthesia Type: No value filed.     Alan Phase I: Alan Score: 10    Alan Phase II:        Anesthesia Post Evaluation    Patient location during evaluation: PACU  Patient participation: complete - patient participated  Level of consciousness: awake and alert  Pain score: 0  Airway patency: patent  Nausea & Vomiting: no nausea and no vomiting  Complications: no  Cardiovascular status: blood pressure returned to baseline  Respiratory status: acceptable  Hydration status: stable  Multimodal analgesia pain management approach

## 2023-03-06 NOTE — OP NOTE
Operative Note      Patient: Wade Lara  YOB: 1965  MRN: [id number]    Date of Procedure: 3/6/2023    Pre-Op Diagnosis: RIGHT HIP OSTEOARTHRITIS    Post-Op Diagnosis: Same       Procedure(s):  RIGHT PRIMARY TOTAL HIP ARTHROPLASTY    Surgeon(s):  Gini Dance, MD    Assistant:   Surgical Assistant: Elisabeth Myles    Anesthesia: General    Estimated Blood Loss (mL): 067    Complications: None    Specimens:   * No specimens in log *    Implants:  Implant Name Type Inv. Item Serial No.  Lot No. LRB No. Used Action   SHELL ACET [id number] 3 H STD HIP POLY POR PRESSFIT R3 - [id number]  SHELL ACET [id number] 3 H STD HIP POLY POR PRESSFIT R3  [facility] [id number] Right 1 Implanted   STEM FEM SZ 15 L165MM HIP TI POR STD OFFSET CEMENTLESS FOR - [id number]  STEM FEM SZ 15 L165MM HIP TI POR STD OFFSET CEMENTLESS FOR  [facility] AND [employer] [facility] [id number] Right 1 Implanted   LINER ACET 36X48 MM HIP 2 MOBILITY XLPE OR3O - [id number]  LINER ACET 36X48 MM HIP 2 MOBILITY XLPE [facility] AND [employer] ORTHOPAEDICS- [id number] Right 1 Implanted   INSERT TIB 22X36 MM 2 MOBILITY XLPE OR3O - [id number]  INSERT TIB 22X36 MM 2 MOBILITY XLPE [facility] AND [employer] [facility] [id number] Right 1 Implanted   HEAD FEM [id number] NK L+0MM OXIDIZED ZIRCONIUM 12/14 TAPR MUKUL - [id number]  HEAD FEM [id number] NK L+0MM OXIDIZED ZIR[employer]IUM 12/14 TAPR MUKUL  [facility] [id number] Right 1 Implanted   SCREW BNE L25MM DIA6.5MM CANC HIP SPHR HD FOR ACET SYS - [id number]  SCREW BNE L25MM DIA6.5MM CANC HIP SPHR HD FOR ACET SYS  [employer] [place] [id number] Right 1 Implanted         Drains: * No LDAs found *    Findings: severe hip osteoarthritis, bare eburnated bone superiorly with flattening. Femoral neck osteophytes, anterior acetabular osteophytes. Operative Technique    The patient was positively identified in the preoperative holding area by the attending surgeon. Informed consent was verified and placed on the chart. Her right lower extremity was marked appropriately. She received a preoperative pericapsular nerve group block. The patient was then taken to the operating room by the anesthesia team.  A general anesthesia was administered. The patient was then positioned in the left lateral decubitus position using a Wixson to stabilize the pelvis. An axillary roll was placed in the left axilla. At this point the hip was examined. Leg lengths were noted. At this point the right lower extremity was prepped and draped in a standard sterile fashion. Timeout was held to verify the correct patient, operative site, laterality and procedure. Everyone in the room was in agreement. At this point a standard anterolateral Lam-Parker approach was utilized to the hip. A longitudinal incision was made centered over the tip of the greater trochanter in line with the axis of the femur. Dissection was carried sharply through the skin and subcutaneous tissues down to the level of the iliotibial band. Electrocautery was used to maintain hemostasis. Next, a Ayala elevator was used to bluntly clear away the soft tissues for adequate visualization. Next, the iliotibial band was split in line with its fibers. This was extended both proximally and distally using curved Ponce scissors. At this point, the gluteus medius insertion onto the greater trochanter was identified. A slip of the anterior third was taken down from the greater trochanter using electrocautery and reflected anteriorly. At this point, an anterior acetabular retractor was placed. At this point, the hip capsule was visualized. A T-shaped capsulotomy was created in line with the axis of the femoral neck and along the intertrochanteric ridge. Once this was done, a bone hook was placed around the femoral neck and the hip was dislocated without difficulty.   The limb was placed into a sterile bag at the edge of the table. There were significant anterior femoral neck osteophytes and this was removed with a rongeur in order to better visualize the femoral neck. At this point, a femoral neck osteotomy was made 1 cm proximal to the lesser trochanter. The femoral head was examined. It was noted to be significantly arthritic with exposed eburnated bone superiorly. It was flattened. At this point, attention was turned to the acetabulum. Retractors were placed around the periphery of the acetabulum. A deep scalpel was used to excise the labrum from around the periphery. At this point, the fovea was identified and ligamentum teres was excised. There was a false medial wall which was apparent upon medial reaming. A size 45 mm diameter reamer was used to medialize down to the medial wall. Subsequent reamers of increasing diameters were utilized to ream to a size 48 outer diameter. This gave appropriate fit and fill while also generating cancellous bleeding bone. The 48 mm diameter trial fit nicely and was able to obtain excellent rim fit. Being satisfied with this, the final 48 outer diameter shell was impacted into place, taking great care to reproduce appropriate anteversion and abduction angle consistent with preoperative templating and also using external guides. There was an anterior osteophyte present about the acetabulum and this was removed using a rongeur. At this point, a single 25-mm screw was drilled, measured, and inserted. This gave excellent fixation in the pelvis. Next, the final dual mobility acetabular liner was impacted into place and ensured to be well seated. Attention was turned to the femoral side. A box osteotome was used to gain access to the intramedullary canal.  This was followed by the canal finder. Subsequent reamers of increasing diameter were used to a size 12. Next, the femur was also broached to a size 12.   The broach was able to be sunk to the appropriate level without difficulty and was stable to rotational stresses. It came to rest just shy of the neck cut. Being satisfied with this, the C-arm fluoroscopy was brought in to evaluate this to assess the cup position as well as femoral neck and stem fill. Being satisfied with this, the final size 12 femoral stem with standard offset was inserted. Trialing was once again carried out. Ultimately, the +0 neck length was felt to be appropriate. The limb was examined for stability throughout range of motion including at extremes as well as physiologic abductor tensioning and the ability to repair the capsule back to its origin. Being satisfied with this, the final +0 36mm diameter dual mobility femoral head was impacted in place, and the hip was reduced one final time. The wound was copiously irrigated with sterile saline solution via pulse lavage. A gram of vancomycin powder was placed deep within the wound. The capsule was closed in a running fashion using 0 Vicryl. A slip of the gluteus medius tendon was repaired using #2 Raji X-braid with modified Ramiro-Facundo type suture via transosseous tunnels. The repair was reinforced with 0 Vicryl. Next, a pericapsular injection mixture was distributed about the soft tissues of the hip. At this point, a layered closure of the wound was carried out using 0 Vicryl in a figure-of-eight interrupted fashion for the IT band. This was reinforced with #1 stratafix. The wound was then closed in a layered fashion with 0 vicryl,, 2-0 vicryl and a subcuticular 3-0 Monocryl. Dermabond glue was used for the skin. Next, a sterile Mepilex dressing was applied. The patient was then awakened from anesthesia and transitioned back to the hospital bed. She was taken to the postoperative recovery area in apparent stable condition. There were no immediate complications. All sponge and needle counts were correct. Postoperative Plan    The patient will receive IV Ancef for 24 hours postoperatively.   She will be allowed weightbearing as tolerated with anterolateral hip precautions and mobilized with physical therapy. She will be provided with oral and IV pain medications. She will be started on aspirin full dose daily for deep venous thrombosis prevention.   Anticipate discharge late today vs tomorrow AM.    Electronically signed by Seema Galaviz MD on 3/6/2023 at 9:50 AM

## 2023-03-06 NOTE — PROGRESS NOTES
Arrived to [facility] consents verified, NPO since 2345, belongings on cart for admission ( to take purse).

## 2023-03-06 NOTE — CARE COORDINATION
[facility]    Referral received from [facility] ortho gordon RN to follow for home care services. I will follow for needs, and speak with patient to verify demos.     Adi Thomas RN, BSN CTN  [facility] [phone]

## 2023-03-07 VITALS
OXYGEN SATURATION: 98 % | HEART RATE: 80 BPM | DIASTOLIC BLOOD PRESSURE: 74 MMHG | HEIGHT: 62 IN | TEMPERATURE: 98.1 F | RESPIRATION RATE: 14 BRPM | SYSTOLIC BLOOD PRESSURE: 128 MMHG | WEIGHT: 219.5 LBS | BODY MASS INDEX: 40.39 KG/M2

## 2023-03-07 PROBLEM — Z96.641 STATUS POST TOTAL REPLACEMENT OF RIGHT HIP: Status: ACTIVE | Noted: 2023-03-07

## 2023-03-07 LAB
ANION GAP SERPL CALCULATED.3IONS-SCNC: 11 MMOL/L (ref 3–16)
BUN BLDV-MCNC: 17 MG/DL (ref 7–20)
CALCIUM SERPL-MCNC: 9.3 MG/DL (ref 8.3–10.6)
CHLORIDE BLD-SCNC: 101 MMOL/L (ref 99–110)
CO2: 24 MMOL/L (ref 21–32)
CREAT SERPL-MCNC: 0.6 MG/DL (ref 0.6–1.1)
GFR SERPL CREATININE-BSD FRML MDRD: >60 ML/MIN/{1.73_M2}
GLUCOSE BLD-MCNC: 127 MG/DL (ref 70–99)
HCT VFR BLD CALC: 28.1 % (ref 36–48)
HEMOGLOBIN: 9.5 G/DL (ref 12–16)
MCH RBC QN AUTO: 30.4 PG (ref 26–34)
MCHC RBC AUTO-ENTMCNC: 33.9 G/DL (ref 31–36)
MCV RBC AUTO: 89.5 FL (ref 80–100)
PDW BLD-RTO: 13.7 % (ref 12.4–15.4)
PLATELET # BLD: 240 K/UL (ref 135–450)
PMV BLD AUTO: 7.5 FL (ref 5–10.5)
POTASSIUM SERPL-SCNC: 4.5 MMOL/L (ref 3.5–5.1)
RBC # BLD: 3.14 M/UL (ref 4–5.2)
SODIUM BLD-SCNC: 136 MMOL/L (ref 136–145)
WBC # BLD: 11.5 K/UL (ref 4–11)

## 2023-03-07 PROCEDURE — G0378 HOSPITAL OBSERVATION PER HR: HCPCS

## 2023-03-07 PROCEDURE — 2580000003 HC RX 258: Performed by: ORTHOPAEDIC SURGERY

## 2023-03-07 PROCEDURE — 97110 THERAPEUTIC EXERCISES: CPT

## 2023-03-07 PROCEDURE — 97530 THERAPEUTIC ACTIVITIES: CPT

## 2023-03-07 PROCEDURE — 36415 COLL VENOUS BLD VENIPUNCTURE: CPT

## 2023-03-07 PROCEDURE — 6370000000 HC RX 637 (ALT 250 FOR IP): Performed by: ORTHOPAEDIC SURGERY

## 2023-03-07 PROCEDURE — 97116 GAIT TRAINING THERAPY: CPT

## 2023-03-07 PROCEDURE — 85027 COMPLETE CBC AUTOMATED: CPT

## 2023-03-07 PROCEDURE — APPNB60 APP NON BILLABLE TIME 46-60 MINS: Performed by: SPECIALIST/TECHNOLOGIST

## 2023-03-07 PROCEDURE — 99024 POSTOP FOLLOW-UP VISIT: CPT | Performed by: SPECIALIST/TECHNOLOGIST

## 2023-03-07 PROCEDURE — 80048 BASIC METABOLIC PNL TOTAL CA: CPT

## 2023-03-07 RX ORDER — OXYCODONE HYDROCHLORIDE 5 MG/1
5 TABLET ORAL EVERY 6 HOURS PRN
Qty: 28 TABLET | Refills: 0 | Status: SHIPPED | OUTPATIENT
Start: 2023-03-07 | End: 2023-03-14

## 2023-03-07 RX ADMIN — ACETAMINOPHEN 325MG 650 MG: 325 TABLET ORAL at 02:16

## 2023-03-07 RX ADMIN — Medication 10 ML: at 09:02

## 2023-03-07 RX ADMIN — MINOCYCLINE HYDROCHLORIDE 100 MG: 50 CAPSULE ORAL at 08:53

## 2023-03-07 RX ADMIN — ACETAMINOPHEN 325MG 650 MG: 325 TABLET ORAL at 15:14

## 2023-03-07 RX ADMIN — ASPIRIN 325 MG: 325 TABLET, COATED ORAL at 08:53

## 2023-03-07 RX ADMIN — ACETAMINOPHEN 325MG 650 MG: 325 TABLET ORAL at 08:53

## 2023-03-07 RX ADMIN — FLUOXETINE 60 MG: 20 CAPSULE ORAL at 08:53

## 2023-03-07 ASSESSMENT — PAIN DESCRIPTION - ORIENTATION: ORIENTATION: RIGHT

## 2023-03-07 ASSESSMENT — PAIN DESCRIPTION - LOCATION
LOCATION: HIP
LOCATION: HIP

## 2023-03-07 ASSESSMENT — PAIN SCALES - GENERAL
PAINLEVEL_OUTOF10: 3
PAINLEVEL_OUTOF10: 3

## 2023-03-07 NOTE — PROGRESS NOTES
Department of Orthopedic Surgery  Physician Assistant   Progress Note    Subjective:       Systemic or Specific Complaints:No Complaints and and pain is well controlled. Patient very pleasant, tolerating p.o. and voiding appropriately. No family at bedside. Was able to work with therapy, feels ready to discharge    Objective:     Patient Vitals for the past 24 hrs:   BP Temp Temp src Pulse Resp SpO2   03/07/23 0937 128/74 -- -- 80 -- 98 %   03/07/23 0850 128/71 98.1 °F (36.7 °C) Oral 91 14 100 %   03/07/23 0217 (!) 142/69 98.3 °F (36.8 °C) Oral 84 16 97 %   03/06/23 2310 (!) 155/72 98.3 °F (36.8 °C) Oral 86 14 97 %   03/06/23 1945 120/75 98.2 °F (36.8 °C) Oral 81 14 100 %   03/06/23 1715 118/78 98.1 °F (36.7 °C) Oral 75 18 95 %   03/06/23 1616 119/72 97.7 °F (36.5 °C) Oral -- 18 99 %   03/06/23 1500 121/80 97.8 °F (36.6 °C) Oral 82 18 98 %   03/06/23 1314 129/68 -- -- -- -- 93 %   03/06/23 1309 134/66 -- -- -- -- 93 %   03/06/23 1303 137/65 -- -- -- -- 94 %   03/06/23 1253 127/66 -- -- -- -- 94 %   03/06/23 1248 135/67 -- -- -- -- 92 %   03/06/23 1243 132/66 -- -- -- -- 92 %   03/06/23 1238 122/64 -- -- -- -- 94 %   03/06/23 1233 130/61 -- -- -- -- 95 %   03/06/23 1228 (!) 126/59 -- -- -- -- 92 %   03/06/23 1223 132/61 -- -- -- -- 94 %   03/06/23 1218 128/67 -- -- -- -- 98 %   03/06/23 1158 134/73 -- -- -- -- --   03/06/23 1145 135/74 -- -- -- -- --   03/06/23 1139 111/89 -- -- 85 18 99 %   03/06/23 1138 111/89 -- -- 82 -- 100 %   03/06/23 1123 (!) 121/56 -- -- 72 14 93 %   03/06/23 1108 (!) 100/55 -- -- 71 19 96 %   03/06/23 1104 (!) 115/56 -- -- 76 20 92 %   03/06/23 1059 (!) 116/57 -- -- 75 14 93 %   03/06/23 1053 (!) 125/56 -- -- 81 17 91 %       General: alert, appears stated age, cooperative, and no distress   Wound: Wound clean and dry no evidence of infection. , No Erythema, No Drainage, and Positive for Edema   Motion: Painful range of Motion in affected extremity   DVT Exam: No evidence of DVT seen on physical exam.  No cords or calf tenderness. No significant calf/ankle edema. Additional exam: Patient seen sitting up in chair at time of interview. Rotational alignment of legs at neutral  Moderate swelling to the right thigh. Compartments soft and compressible  Nontender to palpation  EHL, FHL, gastroc, anterior tib motor intact  DP and PT pulses 2+, foot warm and well-perfused  Sensation intact to light touch    Data Review  CBC:   Lab Results   Component Value Date/Time    WBC 11.5 03/07/2023 06:01 AM    RBC 3.14 03/07/2023 06:01 AM    HGB 9.5 03/07/2023 06:01 AM    HCT 28.1 03/07/2023 06:01 AM     03/07/2023 06:01 AM       Renal:   Lab Results   Component Value Date/Time     03/07/2023 06:01 AM    K 4.5 03/07/2023 06:01 AM     03/07/2023 06:01 AM    CO2 24 03/07/2023 06:01 AM    BUN 17 03/07/2023 06:01 AM    CREATININE 0.6 03/07/2023 06:01 AM    GLUCOSE 127 03/07/2023 06:01 AM    CALCIUM 9.3 03/07/2023 06:01 AM            Assessment:      right total hip arthroplasty, anterolateral approach POD 1. DOS 3/6/23 by Dr Roche Noni:      1:  WBAT to the LLE, anterolateral hip precautions- no abduction, no internal rotation, no extension  2:  Continue Deep venous thrombosis prophylaxis- aspirin 325mg daily for 4 weeks post op  3:  Continue Pain Control  4: two doses IV ancef completed post op  5: PT/OT recommending 24hr sup or assist, has rolling walker  6: Anticipate DC this afternoon.  DCP updated, prescription sent to [facility] per patient request    Zeny Rea

## 2023-03-07 NOTE — DISCHARGE SUMMARY
Department of Orthopedic Surgery  Physician Assistant   Discharge Summary      The Nyla Paniagua is a 65 y.o. female underwent total hip replacement procedure without complication. Karuna Fitzpatrick was admitted to the floor following their recovery in the PACU. Discharge Diagnosis  right Hip Replacement    Current Inpatient Medications    Current Facility-Administered Medications: sodium chloride flush 0.9 % injection 5-40 mL, 5-40 mL, IntraVENous, 2 times per day  sodium chloride flush 0.9 % injection 5-40 mL, 5-40 mL, IntraVENous, PRN  0.9 % sodium chloride infusion, , IntraVENous, PRN  acetaminophen (TYLENOL) tablet 650 mg, 650 mg, Oral, Q6H  ondansetron (ZOFRAN-ODT) disintegrating tablet 4 mg, 4 mg, Oral, Q8H PRN **OR** ondansetron (ZOFRAN) injection 4 mg, 4 mg, IntraVENous, Q6H PRN  oxyCODONE (ROXICODONE) immediate release tablet 5 mg, 5 mg, Oral, Q4H PRN **OR** oxyCODONE (ROXICODONE) immediate release tablet 10 mg, 10 mg, Oral, Q4H PRN  HYDROmorphone (DILAUDID) injection 0.5 mg, 0.5 mg, IntraVENous, Q3H PRN  aspirin EC tablet 325 mg, 325 mg, Oral, Daily  atorvastatin (LIPITOR) tablet 10 mg, 10 mg, Oral, Nightly  FLUoxetine (PROZAC) capsule 60 mg, 60 mg, Oral, Daily  methocarbamol (ROBAXIN) tablet 500 mg, 500 mg, Oral, TID PRN  minocycline (MINOCIN;DYNACIN) capsule 100 mg, 100 mg, Oral, BID    Post-operatively the patients diet was advanced as tolerated and their incision was checked prior to discharge. Patient was personally seen by myself or a provider team member each day of admission. The incision is dressing in place, clean, dry, and intact with no signs of infection. The patient remained neurovascularly intact in the lower extremity and had intact pulses distally. Patients calf remained soft and showed no evidence of DVT. The patient was able to move their leg and ankle/foot without any problems post-operatively.   Physical therapy and occupational therapy were consulted and began working with the patient post-operatively. The patient progressed with PT/OT as would be expected and continued to improve through their stay. Agree with PT on their recommendations of a assistive device such as a walker or cane at the time of discharge. The patients pain was initially controlled with IV medications but we were able to transition to oral pain medications soon after arrival to the floor and their pain remained under good control through their hospital stay. From a medical standpoint the patient remained stable and continued to have the medicine team follow throughout their stay. The patients dressing was changed/incision was checked on day of d/c. The patient will be discharged at this time to Home  with their current diet restrictions and will continue to follow the hip precautions outlined to them by us and PT/OT. Condition on Discharge: Stable    Plan  Return visit in 2 weeks. .  Patient was instructed on the use of pain medications, the signs and symptoms of infection, and was given our number to call should they have any questions or concerns following discharge. For opioid prescriptions given at discharge the following statement is provided for compliance with [employer] rules. Patient being given increased dosage/quantity of opoid pain medication in excess of [employer] guidelines which noted a 30 MED daily of opioids due to the fact that he/she has undergone major orthopaedic surgery as outlined in rule 4731-11-13. Dosages and further duration of the pain medication will be re-evaluated at her post op visit in 2 weeks. Patient was educated on dosing expectations and limits of prescribing as a result of the new [facility] rules enacted August 31, 2017. Please also note that this is not the initial opoid prescription issued to this patient but a continuation of medication utilized during the hospital admission as noted in the medical record.  [place] report has also been utilized to screen for any abuse history or suspicious activity as outlined in [place]. All efforts have been taken to prevent abuse potential and misuse of opioid medications including education, screening, and close clinical follow up.

## 2023-03-07 NOTE — CARE COORDINATION
[facility]    DC order noted, all docs needed have been faxed to [facility] for home care services.     Home care to see patient within 24-48 hrs    Art Jacob RN, BSN CTN  [facility] [phone]

## 2023-03-07 NOTE — PLAN OF CARE
Problem: Discharge Planning  Goal: Discharge to home or other facility with appropriate resources  3/7/2023 1353 by Jason Allan  Outcome: Adequate for Discharge  3/7/2023 1001 by Jason Allan  Outcome: Progressing     Problem: Safety - Adult  Goal: Free from fall injury  3/7/2023 1353 by Jason Allan  Outcome: Adequate for Discharge  3/7/2023 1001 by Jason Allan  Outcome: Progressing     Problem: ABCDS Injury Assessment  Goal: Absence of physical injury  3/7/2023 1353 by Jason Allan  Outcome: Adequate for Discharge  3/7/2023 1001 by Jason Allan  Outcome: Progressing     Problem: Pain  Goal: Verbalizes/displays adequate comfort level or baseline comfort level  3/7/2023 1353 by Jason Allan  Outcome: Adequate for Discharge  3/7/2023 1001 by Jason Allan  Outcome: Progressing

## 2023-03-07 NOTE — PROGRESS NOTES
Patient and family given discharge instructions. All questions and concerns were addressed. Patient dressing was clean dry and intact. Patient wheeled to car with all patient belongings.

## 2023-03-07 NOTE — PROGRESS NOTES
Physical Therapy  Facility/Department: [facility] C5 - MED SURG/ORTHO  Daily Treatment Note  NAME: Samanta Lai  : 1965  MRN: [id number]    Date of Service: 3/7/2023    Discharge Recommendations:  Home with assist PRN;  Home health PT  PT Equipment Recommendations  Equipment Needed: No  Other: Has RW  [facility] 6 Clicks Inpatient Mobility:  AM-PAC Basic Mobility - Inpatient   How much help is needed turning from your back to your side while in a flat bed without using bedrails?: None  How much help is needed moving from lying on your back to sitting on the side of a flat bed without using bedrails?: None  How much help is needed moving to and from a bed to a chair?: None  How much help is needed standing up from a chair using your arms?: None  How much help is needed walking in hospital room?: None  How much help is needed climbing 3-5 steps with a railing?: A Little  AM-[facility] Inpatient Mobility Raw Score : 23  AM-PAC Inpatient T-Scale Score : 56.93  Mobility Inpatient CMS 0-100% Score: 11.2  Mobility Inpatient CMS G-Code Modifier : CI    Patient Diagnosis(es): There were no encounter diagnoses. Assessment   Assessment: Pt demos bed mobility mod indep and transfer with SPV, ambulate with SPV and navigate up<>down 1 curb step with sba. Pt demos safe mobility and ability to maintain BRIANA precautions while completing functional mobility. Pt is safe to DC home with PRN assist when deemed medically appropraite. Activity Tolerance: Patient tolerated treatment well  Equipment Needed: No  Other: Has RW     Plan    Physcial Therapy Plan  General Plan: 2 times a day 7 days a week  Current Treatment Recommendations: Strengthening;ROM;Balance training;Gait training; Safety education & training; Therapeutic activities; Home exercise program;Stair training;Patient/Caregiver education & training;Transfer training;Functional mobility training;Neuromuscular re-education; Endurance training;Pain management;Positioning Restrictions  Restrictions/Precautions  Restrictions/Precautions: Fall Risk, Weight Bearing, ROM Restrictions  Lower Extremity Weight Bearing Restrictions  Right Lower Extremity Weight Bearing: Weight Bearing As Tolerated  Position Activity Restriction  Hip Precautions: No hip extension, No hip external rotation, No active ABduction  Other position/activity restrictions: Day of Surgery Orders- 1) Up to bedside evening of surgery 2) Bathroom privileges with assistance     Subjective    Subjective  Subjective: Pt agrees to PT session; supine in bed at approach  Pain: 4/10 R joaquin  Orientation  Overall Orientation Status: Within Functional Limits     Objective   Vitals  Heart Rate: 80  BP: 128/74  BP Location: Right upper arm  MAP (Calculated): 92  SpO2: 98 %  O2 Device: None (Room air)  Bed Mobility Training  Bed Mobility Training: Yes  Interventions: Verbal cues  Supine to Sit: Modified independent  Sit to Supine: Modified independent  Scooting: Modified independent  Balance  Sitting: Intact  Standing: Impaired (rw)  Standing - Static: Good  Standing - Dynamic: Good  Transfer Training  Transfer Training: Yes  Sit to Stand: Supervision  Stand to Sit: Supervision  Toilet Transfer: Supervision  Gait Training  Gait Training: Yes  Gait  Overall Level of Assistance: Supervision  Interventions: Safety awareness training;Verbal cues  Gait Abnormalities: Step to gait  Distance (ft): 150 Feet (15 ft, 49 ft)  Assistive Device: Gait belt;Walker, rolling  Rail Use: None  Curbs/Ramps: Stand-by assistance (2x; 1 curb step with RW)   Patient Educated in safety with car transfers and  dispensed instruction on car transfers with use of assistive device with patient demonstrating:  [x] Verbalizing understanding of appropriate technique, maintaining any ordered precautions for car transfer training s/p TJR  [x] Other: Educated patient in written car transfer instruction with patient verbalizing understanding of appropriate techniques. PT Exercises  Exercise Treatment: 12-15 BRIANA exericses     Safety Devices  Type of Devices: Gait belt;Call light within reach;Nurse notified; Chair alarm in place; Left in chair       Goals  Short Term Goals  Time Frame for Short Term Goals: 3/08/23  Short Term Goal 1: pt will complete bed mobility with mod I  -3/07 met  Short Term Goal 2: pt will complete transfers with supervision   -3/07 met  Short Term Goal 3: pt will ambulate 100ft with LRAD and supervision   -3/07 met  Short Term Goal 4: pt will navigate up<>down 2 steps with no HR and LRAD with SBA   -3/07 met  Short Term Goal 5: pt will participate in 12-15 reps of BLE exericses by 3/07/23   -3/07  met  Additional Goals?: No  Patient Goals   Patient Goals : \"I want to walk with my walker with only a little help\" -- MET 3/06    Education  Patient Education  Education Given To: Patient  Education Provided: Plan of Care;Role of Therapy;Home Exercise Program;Precautions; Equipment;Transfer Training  Education Method: Verbal;Printed Information/Hand-outs;Demonstration  Barriers to Learning: None  Education Outcome: Verbalized understanding;Demonstrated understanding    Therapy Time   Individual Concurrent Group Co-treatment   Time In 0800         Time Out 0853         Minutes 53         Timed Code Treatment Minutes: [address]

## 2023-03-08 ENCOUNTER — TELEPHONE (OUTPATIENT)
Dept: ORTHOPEDIC SURGERY | Age: 58
End: 2023-03-08

## 2023-03-08 ENCOUNTER — TELEPHONE (OUTPATIENT)
Dept: FAMILY MEDICINE CLINIC | Age: 58
End: 2023-03-08

## 2023-03-08 NOTE — TELEPHONE ENCOUNTER
[facility] Transitions Initial Follow Up Call    Outreach made within 2 business days of discharge: Yes    Patient: Aravind Blanco Patient : 1965   MRN: [id number]  Reason for Admission: There are no discharge diagnoses documented for the most recent discharge. Discharge Date: 3/7/23       Spoke with: PT had total hip replacement procedure without complication, with follow Ortho, no TCM needed. Discharge department/facility: [facility]    Non-face-to-face services provided:  Obtained and reviewed discharge summary and/or continuity of care documents    TCM Interactive Patient Contact:  Was patient able to fill all prescriptions: Yes  Was patient instructed to bring all medications to the follow-up visit: Yes  Is patient taking all medications as directed in the discharge summary?  Yes  Does patient understand their discharge instructions: Yes  Does patient have questions or concerns that need addressed prior to 7-14 day follow up office visit: no    Scheduled appointment with PCP within 7-14 days    Follow Up  Future Appointments   Date Time Provider Torres Flores   3/21/2023 11:15 AM Opal Gamble MD AND ORTHO [facility]   2023  1:15 PM MD KAMI Esteves LPN

## 2023-03-08 NOTE — TELEPHONE ENCOUNTER
Medical Facility Question     Facility Name: [address] [place] [address]  Contact Name: Tori Darling Number: [phone]  Request or Information:       STATES THAT THEY DID GREAT     PT IS GOING TO SEE HER 1X FOR 3 WEEKS. PLEASE GIVE  VERBAL OKAY.

## 2023-03-09 ENCOUNTER — TELEPHONE (OUTPATIENT)
Dept: ORTHOPEDIC SURGERY | Age: 58
End: 2023-03-09

## 2023-03-09 NOTE — TELEPHONE ENCOUNTER
Returned call, left voicemail. Informed her she can leave questions with call center when she calls back and we will get back to her with answers.

## 2023-03-09 NOTE — TELEPHONE ENCOUNTER
Spoke with pt, doing much better than she thought she would. Getting up and waling well with walker    Incision status: No drainage or redness    Edema/Swelling/Teds: Minimal    Pain level and status: Using tylenol only for pain management    Use of pain medications: Using robaxin and tylenol every 6 hours. Blood thinner: ASA 325mg daily    Bowels: Starting to move    [address] active: Ayse Mcrae active and is going well. Outpatient therapy: NA    Do you have all of your medications: Yes    Changes in medications: no    Instructed pt to call Nurse Navigator or surgeon's office with any questions or concerns.      Follow up appointments:    Future Appointments   Date Time Provider Torres Flores   3/21/2023 11:15 AM Seema Galaviz MD AND [facility]   6/22/2023  1:15 PM MD KAMI Solis

## 2023-03-09 NOTE — TELEPHONE ENCOUNTER
Surgery and/or Procedure Scheduling     Contact Name: Kurt Coe  Surgical/Procedure Request: Sx on 03/06/23   Patient Contact Number: [phone]    The patient would like a call back she has some questions regarding her sx. Please Advise.

## 2023-03-12 DIAGNOSIS — G89.29 CHRONIC RIGHT-SIDED LOW BACK PAIN, UNSPECIFIED WHETHER SCIATICA PRESENT: ICD-10-CM

## 2023-03-12 DIAGNOSIS — M54.50 ACUTE LEFT-SIDED LOW BACK PAIN, UNSPECIFIED WHETHER SCIATICA PRESENT: ICD-10-CM

## 2023-03-12 DIAGNOSIS — M54.50 CHRONIC RIGHT-SIDED LOW BACK PAIN, UNSPECIFIED WHETHER SCIATICA PRESENT: ICD-10-CM

## 2023-03-13 RX ORDER — METHOCARBAMOL 500 MG/1
500-1000 TABLET, FILM COATED ORAL 3 TIMES DAILY PRN
Qty: 60 TABLET | Refills: 0 | Status: SHIPPED | OUTPATIENT
Start: 2023-03-13

## 2023-03-13 NOTE — TELEPHONE ENCOUNTER
Refill Request     CONFIRM preferred pharmacy with the patient. If Mail Order Rx - Pend for 90 day refill. Last Seen: Last Seen Department: 2/13/2023  Last Seen by PCP: Visit date not found    Last Written: 01/16/2023 60 tablet 0 refills     If no future appointment scheduled, route STAFF MESSAGE with patient name to the [facility] [address] for scheduling. Next Appointment:   Future Appointments   Date Time Provider Torres Flores   3/21/2023 11:15 AM Pastor Len MD AND ORTHO [facility]   6/22/2023  1:15 PM Maricarmen King MD [facility] [place] - [facility]       Message sent to [address] [place] [address] to schedule appt with patient?   NO      Requested Prescriptions     Pending Prescriptions Disp Refills    methocarbamol (ROBAXIN) 500 MG tablet [Pharmacy Med Name: METHOCARBAMOL 500 MG TABLET] 60 tablet 0     Sig: TAKE 1-2 TABLETS BY MOUTH 3 TIMES DAILY AS NEEDED (BACK PAIN)

## 2023-03-15 ENCOUNTER — TELEPHONE (OUTPATIENT)
Dept: ORTHOPEDIC SURGERY | Age: 58
End: 2023-03-15

## 2023-03-17 NOTE — TELEPHONE ENCOUNTER
Attempted to contact pt, unable to leave a voicemail with purpose and call back number. Signed off from pt. Instructed pt to call RN or Surgeon's office with any issues or concerns.     Roberta Saunders  Orthopedic Nurse Navigator  Phone number: [phone]    Follow up appointments:    Future Appointments   Date Time Provider Torres Flores   3/21/2023 11:15 AM Sharon Espinosa MD AND ORTHO [facility]   6/22/2023  1:15 PM MD KAMI Lozano

## 2023-03-21 ENCOUNTER — OFFICE VISIT (OUTPATIENT)
Dept: ORTHOPEDIC SURGERY | Age: 58
End: 2023-03-21

## 2023-03-21 VITALS — WEIGHT: 219 LBS | BODY MASS INDEX: 40.3 KG/M2 | HEIGHT: 62 IN

## 2023-03-21 DIAGNOSIS — Z47.89 ORTHOPEDIC AFTERCARE: ICD-10-CM

## 2023-03-21 DIAGNOSIS — Z96.641 STATUS POST TOTAL REPLACEMENT OF RIGHT HIP: Primary | ICD-10-CM

## 2023-03-21 PROCEDURE — 99024 POSTOP FOLLOW-UP VISIT: CPT | Performed by: ORTHOPAEDIC SURGERY

## 2023-03-22 NOTE — PROGRESS NOTES
tenderness and a negative Homans' sign. RADIOGRAPHIC EXAM:  AP pelvis as well as AP and frog lateral views of the right hip demonstrate total hip arthroplasty components in expected position. There is no evidence of tino-implant fracture. No lucency or loosening. No change in alignment from immediate postoperative films. Overall limb length and offset is appropriate. ASSESSMENT AND PLAN:  2 weeks status post right primary total hip arthroplasty    Doing extremely well  Pain control  Weightbearing as tolerated right lower extremity  DVT prophylaxis: Aspirin 325 mg by mouth daily x6 weeks  Wound care: May leave incision site open to air at this point. May shower but not soak or submerge incision sites. Anterolateral hip precautions x10 weeks postop  PT: Referral provided to [facility] office outpatient PT  Return to clinic in 4 weeks for repeat assessment with new x-rays at that time.     Milo Zuñiga MD

## 2023-03-29 ENCOUNTER — HOSPITAL ENCOUNTER (OUTPATIENT)
Dept: PHYSICAL THERAPY | Age: 58
Setting detail: THERAPIES SERIES
Discharge: HOME | End: 2023-03-29
Payer: COMMERCIAL

## 2023-03-29 PROCEDURE — 97161 PT EVAL LOW COMPLEX 20 MIN: CPT | Performed by: PHYSICAL THERAPIST

## 2023-03-29 PROCEDURE — 97110 THERAPEUTIC EXERCISES: CPT | Performed by: PHYSICAL THERAPIST

## 2023-03-29 NOTE — FLOWSHEET NOTE
Progress Update:  [] Patient is progressing as expected towards functional goals listed. [] Progression is slowed due to complexities/Impairments listed. [] Progression has been slowed due to co-morbidities. [x] Plan just implemented, too soon to assess goals progression <30days   [] Goals require adjustment due to lack of progress  [] Patient is not progressing as expected and requires additional follow up with physician  [] Other    Prognosis for POC: [x] Good [] Fair  [] Poor      Patient requires continued skilled intervention: [x] Yes  [] No    Treatment/Activity Tolerance:  [x] Patient able to complete treatment  [] Patient limited by fatigue  [] Patient limited by pain    [] Patient limited by other medical complications  [] Other:     ASSESSMENT: see eval      PLAN: See eval  [] Continue per plan of care [] Alter current plan (see comments above)  [x] Plan of care initiated [] Hold pending MD visit [] Discharge      Electronically signed by:  Aleyda Kaufman, PT  Missy Montesinos, SPT Therapist was present, directed the patient's care, made skilled judgement, and was responsible for assessment and treatment of the patient. Note: If patient does not return for scheduled/ recommended follow up visits, this note will serve as a discharge from care along with most recent update on progress.

## 2023-03-29 NOTE — THERAPY EVALUATION
[] Not Met: [] Adjusted  2. Patient will demonstrate increased AROM to 115 degrees of hip flexion to allow for proper joint to complete flight of stairs to the basement to do laundry. [] Progressing: [] Met: [] Not Met: [] Adjusted  3. Patient will demonstrate an increase in Strength to 5/5 proximal hip strength and control to allow for proper functional mobility to ambulate community distances without an AD. [] Progressing: [] Met: [] Not Met: [] Adjusted  4. Patient will return to household chores including cleaning and vacuuming without pain no greater than 1-2/10 pain. [] Progressing: [] Met: [] Not Met: [] Adjusted  5. Pt will demonstrate the ability to return water aerobics class at the [facility] for 30-45 min with no pain. [] Progressing: [] Met: [] Not Met: [] Adjusted     Electronically signed by:  Steve Lau, PT   Madiha Gamboa, AKIL Therapist was present, directed the patient's care, made skilled judgement, and was responsible for assessment and treatment of the patient.

## 2023-04-03 ENCOUNTER — HOSPITAL ENCOUNTER (OUTPATIENT)
Dept: PHYSICAL THERAPY | Age: 58
Setting detail: THERAPIES SERIES
Discharge: HOME | End: 2023-04-03
Payer: COMMERCIAL

## 2023-04-03 PROCEDURE — 97140 MANUAL THERAPY 1/> REGIONS: CPT | Performed by: PHYSICAL THERAPIST

## 2023-04-03 PROCEDURE — 97110 THERAPEUTIC EXERCISES: CPT | Performed by: PHYSICAL THERAPIST

## 2023-04-03 NOTE — FLOWSHEET NOTE
functional goals listed. [] Progression is slowed due to complexities listed. [] Progression has been slowed due to co-morbidities. [] Plan just implemented, too soon to assess goals progression  [] Other:         Overall Progression Towards Functional goals/ Treatment Progress Update:  [x] Patient is progressing as expected towards functional goals listed. [] Progression is slowed due to complexities/Impairments listed. [] Progression has been slowed due to co-morbidities. [] Plan just implemented, too soon to assess goals progression <30days   [] Goals require adjustment due to lack of progress  [] Patient is not progressing as expected and requires additional follow up with physician  [] Other    Prognosis for POC: [x] Good [] Fair  [] Poor      Patient requires continued skilled intervention: [x] Yes  [] No    Treatment/Activity Tolerance:  [x] Patient able to complete treatment  [] Patient limited by fatigue  [] Patient limited by pain    [] Patient limited by other medical complications  [] Other:     ASSESSMENT: Pt demonstrated increased achy sensation in the R hip today prior to starting therapy. Performed gentle ROM and stretches to decrease symptoms. Added standing hip abduction glider with UE support, required SBA for improved safety and verbal cues to decrease ER. Added narrow stance balance on airex required SBA without use of UE, pt demonstrated no pain in the R hip. Pt reported improvements of discomfort in the R hip following exercises performed today. Pt education of activity modifications at home and implementing rest breaks between tasks due to increased soreness/achy sensation.       PLAN: See eval  [x] Continue per plan of care [] Alter current plan (see comments above)  [] Plan of care initiated [] Hold pending MD visit [] Discharge      Electronically signed by:  Andrew Fuller PT  Maddie Manriquez, [facility] Therapist was present, directed the patient's care, made skilled judgement, and

## 2023-04-04 DIAGNOSIS — G89.29 CHRONIC RIGHT-SIDED LOW BACK PAIN, UNSPECIFIED WHETHER SCIATICA PRESENT: ICD-10-CM

## 2023-04-04 DIAGNOSIS — F33.0 MILD EPISODE OF RECURRENT MAJOR DEPRESSIVE DISORDER (HCC): ICD-10-CM

## 2023-04-04 DIAGNOSIS — M54.50 ACUTE LEFT-SIDED LOW BACK PAIN, UNSPECIFIED WHETHER SCIATICA PRESENT: ICD-10-CM

## 2023-04-04 DIAGNOSIS — M54.50 CHRONIC RIGHT-SIDED LOW BACK PAIN, UNSPECIFIED WHETHER SCIATICA PRESENT: ICD-10-CM

## 2023-04-04 NOTE — TELEPHONE ENCOUNTER
.Refill Request     CONFIRM preferred pharmacy with the patient. If Mail Order Rx - Pend for 90 day refill. Last Seen: Last Seen Department: 2/13/2023  Last Seen by PCP: 11/11/2022    Last Written: 3/13/23 60 with 0     If no future appointment scheduled:  Review the last OV with PCP and review information for follow-up visit,  Route STAFF MESSAGE with patient name to the [facility] for scheduling with the following inform3/13/23 60 with 0 ation:            -  Timing of next visit           -  Visit type ie Physical, OV, etc           -  Diagnoses/Reason ie. COPD, HTN - Do not use MEDICATION, Follow-up or CHECK UP - Give reason for visit      Next Appointment:   Future Appointments   Date Time Provider Torres Flores   4/10/2023  2:00 PM Tonnie Hook, PT [facility] AND PT Samia Dalia HOD   4/13/2023  2:00 PM Tonnie Hook, PT [facility] AND PT Macel Police   4/17/2023  2:00 PM Tonnie Hook, PT [facility] AND PT [facility]   4/18/2023 10:15 AM Mansi Eng MD AND ORTHO [facility]   4/20/2023 12:00 PM Tonnie Hook, PT [facility] AND PT [facility]   4/24/2023  2:00 PM Tonnie Hook, PT [facility] AND PT [facility]   4/27/2023  2:00 PM Tonnie Hook, PT [facility] AND PT [facility]   6/22/2023  1:15 PM Dino Aguilar MD [facility] [place] [facility]       Message sent to [address] [place] [address] to schedule appt with patient?   N/A      Requested Prescriptions     Pending Prescriptions Disp Refills    methocarbamol (ROBAXIN) 500 MG tablet [Pharmacy Med Name: METHOCARBAMOL 500 MG TABLET] 60 tablet 0     Sig: TAKE 1-2 TABLETS BY MOUTH 3 TIMES DAILY AS NEEDED (BACK PAIN)

## 2023-04-05 RX ORDER — METHOCARBAMOL 500 MG/1
500-1000 TABLET, FILM COATED ORAL 3 TIMES DAILY PRN
Qty: 60 TABLET | Refills: 0 | Status: SHIPPED | OUTPATIENT
Start: 2023-04-05

## 2023-04-05 RX ORDER — FLUOXETINE HYDROCHLORIDE 20 MG/1
CAPSULE ORAL
Qty: 270 CAPSULE | Refills: 0 | Status: SHIPPED | OUTPATIENT
Start: 2023-04-05

## 2023-04-17 ENCOUNTER — HOSPITAL ENCOUNTER (OUTPATIENT)
Dept: PHYSICAL THERAPY | Age: 58
Setting detail: THERAPIES SERIES
Discharge: HOME | End: 2023-04-17
Payer: COMMERCIAL

## 2023-04-17 PROCEDURE — 97110 THERAPEUTIC EXERCISES: CPT | Performed by: PHYSICAL THERAPIST

## 2023-04-17 PROCEDURE — 97140 MANUAL THERAPY 1/> REGIONS: CPT | Performed by: PHYSICAL THERAPIST

## 2023-04-17 NOTE — FLOWSHEET NOTE
[facility],  [address]  Phone: [phone]  Fax [phone]    Physical Therapy Treatment Note/ Progress Report:           Date:  2023    Patient Name:  Nichole Wise    :  1965  MRN: [id number]  Restrictions/Precautions:    Medical Diagnosis Information:  Orthopedic aftercare [Z47.89]  Status post total replacement of right hip [Z96.641]    Treatment diagnosis: S/p R BRIANA 3/6/23 Z96.649, difficulty walking R26.2                                    Insurance information: Medical [place], 40 visits PT, 0$ copay, no auth  Physician Information:  Marie Hernandez MD  Has the plan of care been signed (Y/N):        []  Yes  [x]  No     Date of Patient follow up with Physician: 3/18/23      Is this a Progress Report:     []  Yes  [x]  No        If Yes:  Date Range for reporting period:  Beginning 23  Ending    Progress report will be due (10 Rx or 30 days whichever is less):        Recertification will be due (POC Duration  / 90 days whichever is less): 12 weeks POC      Visit # Insurance Allowable Auth Required   In-person 3 40 visits []  Yes [x]  No    Telehealth   []  Yes []  No    Total          Functional Scale: LEFS: = 85%    Date assessed:  23      Therapy Diagnosis/Practice Pattern:      Number of Comorbidities:  []0     []1-2    [x]3+    Latex Allergy:  [x]NO      []YES  Preferred Language for Healthcare:   [x]English       []other:      Pain level:  1/10     SUBJECTIVE: Pt reports getting in/out of the car and shower are becoming easier. Pt has been able to find comfortable position sleeping in bed. Pt reports she is able to walk longer distances with FWW.     OBJECTIVE: See eval  Observation:   Test measurements:      RESTRICTIONS/PRECAUTIONS: S/p Anterolateral Approach R BRIANA 3/6/23 avoid excessive hip extension combined with ER, avoid excessive adduction, WBAT, HBP,

## 2023-04-18 ENCOUNTER — OFFICE VISIT (OUTPATIENT)
Dept: ORTHOPEDIC SURGERY | Age: 58
End: 2023-04-18

## 2023-04-18 VITALS — WEIGHT: 219 LBS | HEIGHT: 62 IN | BODY MASS INDEX: 40.3 KG/M2

## 2023-04-18 DIAGNOSIS — Z96.641 STATUS POST TOTAL REPLACEMENT OF RIGHT HIP: ICD-10-CM

## 2023-04-18 DIAGNOSIS — Z47.89 ORTHOPEDIC AFTERCARE: Primary | ICD-10-CM

## 2023-04-18 NOTE — PROGRESS NOTES
and frog lateral views of the right hip demonstrate total hip arthroplasty components in expected position. There is no evidence of tino-implant fracture. No lucency or loosening. No features of heterotopic ossification. No change in alignment from immediate postoperative films. Overall limb length and offset is appropriate. ASSESSMENT AND PLAN:  6 weeks status post right primary total hip arthroplasty     Doing extremely well  Pain control  Weightbearing as tolerated right lower extremity  DVT prophylaxis: May discontinue  Wound care: Recommended scar massage. May submerge. No restriction. PT: Continue to progress  Return to clinic at 3 months postop.      Adelso Sterling MD

## 2023-04-20 ENCOUNTER — HOSPITAL ENCOUNTER (OUTPATIENT)
Dept: PHYSICAL THERAPY | Age: 58
Setting detail: THERAPIES SERIES
Discharge: HOME | End: 2023-04-20
Payer: COMMERCIAL

## 2023-04-20 PROCEDURE — 97140 MANUAL THERAPY 1/> REGIONS: CPT | Performed by: PHYSICAL THERAPIST

## 2023-04-20 PROCEDURE — 97110 THERAPEUTIC EXERCISES: CPT | Performed by: PHYSICAL THERAPIST

## 2023-04-20 NOTE — FLOWSHEET NOTE
Reps Notes/CUES   Bike 5 min     Glute Sets 10 sec 15 HEP   Quad Sets 10 sec 10 HEP   Slantboard 30 sec 3    SAQ 10 sec 15 HEP   Supine Heel slide HEP   Hooklying Clam OVL 15x each    Hooklying hip abd with SD 10 sec 20 HEP   SB curl in  15x2    SB bridge  10x2    Heel raise/toe raise 2 10 With UE support HEP   Mini Squat  10x2 With UE support HEP   Standing hip abd with glider small ROM  15x each    Standing hip abd bilateral 15x Pt education             Manual Intervention (09531)      R hip PROM flexion/abd X 5 min     Bilat HS stretch X 2 min     Gentle R hip add stretch X 2 min      Gentle Quad stretch supine X 3 min                 NMR re-education (30880)   CUES NEEDED   Weight shift lateral  HEP with UE support   Airex balance stagger stance  2x30 sec SBA   SL balance 15 sec 3x SBA   Gait training without AD  X 3 min SBA                                 Therapeutic Activity (32803)                                          HEP  Access Code: [email or web address]  URL: [email or web address]  Date: 03/29/2023  Prepared by: Crispin Patino     Exercises  - Supine Gluteal Sets  - 1 x daily - 7 x weekly - 10 reps - 10 hold  - Supine Quad Set  - 1 x daily - 7 x weekly - 10 reps - 10 hold  - Supine Short Arc Quad  - 1 x daily - 7 x weekly - 10 reps - 5 hold  - Supine Heel Slide  - 1 x daily - 7 x weekly - 3 sets - 10 reps  - Hooklying Isometric Clamshell  - 1 x daily - 7 x weekly - 1-2 sets - 10 reps - 5 hold  - Supine Hip Adduction Isometric with Ball  - 1 x daily - 7 x weekly - 1-2 sets - 10 reps - 10 hold  - Side to Side Weight Shift with Counter Support  - 1 x daily - 7 x weekly - 2 sets - 10 reps  - Heel Toe Raises with Counter Support  - 1 x daily - 7 x weekly - 2 sets - 10 reps  - Mini Squat with Counter Support  - 1 x daily - 7 x weekly - 10 reps    Therapeutic Exercise and NMR EXR  [x] (54087) Provided verbal/tactile cueing for activities related to strengthening, flexibility, endurance, ROM for improvements in

## 2023-04-24 ENCOUNTER — HOSPITAL ENCOUNTER (OUTPATIENT)
Dept: PHYSICAL THERAPY | Age: 58
Setting detail: THERAPIES SERIES
Discharge: HOME | End: 2023-04-24
Payer: COMMERCIAL

## 2023-04-24 PROCEDURE — 97140 MANUAL THERAPY 1/> REGIONS: CPT | Performed by: PHYSICAL THERAPIST

## 2023-04-24 PROCEDURE — 97110 THERAPEUTIC EXERCISES: CPT | Performed by: PHYSICAL THERAPIST

## 2023-04-24 NOTE — FLOWSHEET NOTE
[facility], [address]  Phone: [phone]  Fax [phone]    Physical Therapy Treatment Note/ Progress Report:           Date:  2023    Patient Name:  Reynaldo Tam    :  1965  MRN: [id number]  Restrictions/Precautions:    Medical Diagnosis Information:  Orthopedic aftercare [Z47.89]  Status post total replacement of right hip [Z96.641]    Treatment diagnosis: S/p R BRIANA 3/6/23 Z96.649, difficulty walking R26.2                                    Insurance information: Medical [place], 40 visits PT, 0$ copay, no auth  Physician Information:  Wander Maddox MD  Has the plan of care been signed (Y/N):        []  Yes  [x]  No     Date of Patient follow up with Physician: 3 month follow up      Is this a Progress Report:     []  Yes  [x]  No        If Yes:  Date Range for reporting period:  Beginning 23  Ending    Progress report will be due (10 Rx or 30 days whichever is less):        Recertification will be due (POC Duration  / 90 days whichever is less): 12 weeks POC      Visit # Insurance Allowable Auth Required   In-person 5 40 visits []  Yes [x]  No    Telehealth   []  Yes []  No    Total          Functional Scale: LEFS: = 85%    Date assessed:  23      Therapy Diagnosis/Practice Pattern:      Number of Comorbidities:  []0     []1-2    [x]3+    Latex Allergy:  [x]NO      []YES  Preferred Language for Healthcare:   [x]English       []other:      Pain level:  1/10     SUBJECTIVE: Pt reported no soreness following last session. Pt reported she is able to put on her R sock where she was unable to prior to surgery. Pt states she can walk around without limping. OBJECTIVE: See eval  Observation: Pt enters clinic with use of FWW.   Test measurements:      RESTRICTIONS/PRECAUTIONS: S/p Anterolateral Approach R BRIANA 3/6/23 avoid excessive hip extension combined with ER,

## 2023-04-27 ENCOUNTER — HOSPITAL ENCOUNTER (OUTPATIENT)
Dept: PHYSICAL THERAPY | Age: 58
Setting detail: THERAPIES SERIES
Discharge: HOME | End: 2023-04-27
Payer: COMMERCIAL

## 2023-04-27 PROCEDURE — 97110 THERAPEUTIC EXERCISES: CPT | Performed by: PHYSICAL THERAPIST

## 2023-04-27 PROCEDURE — 97140 MANUAL THERAPY 1/> REGIONS: CPT | Performed by: PHYSICAL THERAPIST

## 2023-04-27 NOTE — FLOWSHEET NOTE
related to strengthening, flexibility, endurance, ROM for improvements in LE, proximal hip, and core control with self care, mobility, lifting, ambulation. [x] (78835) Provided verbal/tactile cueing for activities related to improving balance, coordination, kinesthetic sense, posture, motor skill, proprioception  to assist with LE, proximal hip, and core control in self care, mobility, lifting, ambulation and eccentric single leg control. NMR and Therapeutic Activities:    [x] (12004 or 65308) Provided verbal/tactile cueing for activities related to improving balance, coordination, kinesthetic sense, posture, motor skill, proprioception and motor activation to allow for proper function of core, proximal hip and LE with self care and ADLs  [x] (67168) Gait Re-education- Provided training and instruction to the patient for proper LE, core and proximal hip recruitment and positioning and eccentric body weight control with ambulation re-education including up and down stairs     Home Exercise Program:    [x] (70337) Reviewed/Progressed HEP activities related to strengthening, flexibility, endurance, ROM of core, proximal hip and LE for functional self-care, mobility, lifting and ambulation/stair navigation   [] (36111)Reviewed/Progressed HEP activities related to improving balance, coordination, kinesthetic sense, posture, motor skill, proprioception of core, proximal hip and LE for self care, mobility, lifting, and ambulation/stair navigation      Manual Treatments:  PROM / STM / Oscillations-Mobs:  G-I, II, III, IV (PA's, Inf., Post.)  [x] (07438) Provided manual therapy to mobilize LE, proximal hip and/or LS spine soft tissue/joints for the purpose of modulating pain, promoting relaxation,  increasing ROM, reducing/eliminating soft tissue swelling/inflammation/restriction, improving soft tissue extensibility and allowing for proper ROM for normal function with self care, mobility, lifting and ambulation.

## 2023-05-01 ENCOUNTER — HOSPITAL ENCOUNTER (OUTPATIENT)
Dept: PHYSICAL THERAPY | Age: 58
Setting detail: THERAPIES SERIES
Discharge: HOME | End: 2023-05-01
Payer: COMMERCIAL

## 2023-05-01 PROCEDURE — 97110 THERAPEUTIC EXERCISES: CPT | Performed by: PHYSICAL THERAPIST

## 2023-05-01 PROCEDURE — 97140 MANUAL THERAPY 1/> REGIONS: CPT | Performed by: PHYSICAL THERAPIST

## 2023-05-01 NOTE — FLOWSHEET NOTE
[facility],  [address]  Phone: [phone]  Fax [phone]    Physical Therapy Treatment Note/ Progress Report:           Date:  2023    Patient Name:  Kamron Alicea    :  1965  MRN: [id number]  Restrictions/Precautions:    Medical Diagnosis Information:  Orthopedic aftercare [Z47.89]  Status post total replacement of right hip [Z96.641]    Treatment diagnosis: S/p R BRIANA 3/6/23 Z96.649, difficulty walking R26.2                                    Insurance information: Medical [place], 40 visits PT, 0$ copay, no auth  Physician Information:  Gissel Tran MD  Has the plan of care been signed (Y/N):        []  Yes  [x]  No     Date of Patient follow up with Physician: 3 month follow up      Is this a Progress Report:     [x]  Yes  []  No        If Yes:  Date Range for reporting period:  Beginning 23  Ending 23    Progress report will be due (10 Rx or 30 days whichever is less):       Recertification will be due (POC Duration  / 90 days whichever is less): 12 weeks POC      Visit # Insurance Allowable Auth Required   In-person 7 40 visits []  Yes [x]  No    Telehealth   []  Yes []  No    Total          Functional Scale: LEFS: 12= 85%    Date assessed:  23   LEFS:  44 = 45% 23    Therapy Diagnosis/Practice Pattern:      Number of Comorbidities:  []0     []1-2    [x]3+    Latex Allergy:  [x]NO      []YES  Preferred Language for Healthcare:   [x]English       []other:      Pain level:  1/10     SUBJECTIVE: Pt is 8 weeks s/p R BRIANA. Pt reports her R hip is achy today due to the cold weather. Pt reported soreness in the R hip following therapy but no pain. OBJECTIVE: See eval  Observation: Pt enters clinic without use of AD.   Test measurements:    ROM LEFT RIGHT 23 R LE   HIP Flex AROM 95  PROM 68 AROM 115   HIP Abd AROM 25 PROM 25 AROM 55   HIP Ext   NT

## 2023-05-04 ENCOUNTER — HOSPITAL ENCOUNTER (OUTPATIENT)
Dept: PHYSICAL THERAPY | Age: 58
Setting detail: THERAPIES SERIES
Discharge: HOME | End: 2023-05-04
Payer: COMMERCIAL

## 2023-05-04 NOTE — FLOWSHEET NOTE
[facility],  [address]        Physical Therapy  Cancellation/No-show Note  Patient Name:  Dai Srinivasan  :  1965   Date:  2023  Cancelled visits to date: 3  No-shows to date: 0    For today's appointment patient:  [x]  Cancelled  []  Rescheduled appointment  []  No-show     Reason given by patient:  [x]  Patient ill  []  Conflicting appointment  []  No transportation    []  Conflict with work  []  No reason given  []  Other:  Flu like symptoms   Comments:      Electronically signed by:  Kirstie Small, PT

## 2023-05-08 ENCOUNTER — HOSPITAL ENCOUNTER (OUTPATIENT)
Dept: PHYSICAL THERAPY | Age: 58
Setting detail: THERAPIES SERIES
Discharge: HOME | End: 2023-05-08
Payer: COMMERCIAL

## 2023-05-08 PROCEDURE — 97140 MANUAL THERAPY 1/> REGIONS: CPT | Performed by: PHYSICAL THERAPIST

## 2023-05-08 PROCEDURE — 97110 THERAPEUTIC EXERCISES: CPT | Performed by: PHYSICAL THERAPIST

## 2023-05-08 NOTE — FLOWSHEET NOTE
[facility],  [address] [place]  Phone: [phone]  Fax [phone]    Physical Therapy Treatment Note/ Progress Report:           Date:  2023    Patient Name:  Amy Greenwood    :  1965  MRN: [id number]  Restrictions/Precautions:    Medical Diagnosis Information:  Orthopedic aftercare [Z47.89]  Status post total replacement of right hip [Z96.641]    Treatment diagnosis: S/p R BRIANA 3/6/23 Z96.649, difficulty walking R26.2                                    Insurance information: Medical [place], 40 visits PT, 0$ copay, no auth  Physician Information:  Valeria Kowalski MD  Has the plan of care been signed (Y/N):        []  Yes  [x]  No     Date of Patient follow up with Physician: 3 month follow up      Is this a Progress Report:     [x]  Yes  []  No        If Yes:  Date Range for reporting period:  Beginning 23  Ending 23    Progress report will be due (10 Rx or 30 days whichever is less): 10/20/76      Recertification will be due (POC Duration  / 90 days whichever is less): 12 weeks POC      Visit # Insurance Allowable Auth Required   In-person 8 40 visits []  Yes [x]  No    Telehealth   []  Yes []  No    Total          Functional Scale: LEFS: = 85%    Date assessed:  23   LEFS:   = 45% 23    Therapy Diagnosis/Practice Pattern:      Number of Comorbidities:  []0     []1-2    [x]3+    Latex Allergy:  [x]NO      []YES  Preferred Language for Healthcare:   [x]English       []other:      Pain level:  1/10     SUBJECTIVE: Pt is 9 weeks s/p R BRIANA. Pt reports she was sick last week with a flu bug, and was achy all over. Pt states she is feeling much better this week. OBJECTIVE: See eval  Observation: Pt enters clinic without use of AD.   Test measurements:    ROM LEFT RIGHT 23 R LE   HIP Flex AROM 95  PROM 68 AROM 115   HIP Abd AROM 25 PROM 25 AROM 55   HIP Ext   NT

## 2023-05-11 ENCOUNTER — HOSPITAL ENCOUNTER (OUTPATIENT)
Dept: PHYSICAL THERAPY | Age: 58
Setting detail: THERAPIES SERIES
Discharge: HOME | End: 2023-05-11
Payer: COMMERCIAL

## 2023-05-11 NOTE — FLOWSHEET NOTE
[facility],  [address]        Physical Therapy  Cancellation/No-show Note  Patient Name:  Shilpi Cronin  :  1965   Date:  2023  Cancelled visits to date: 4  No-shows to date: 0    For today's appointment patient:  [x]  Cancelled  []  Rescheduled appointment  []  No-show     Reason given by patient:  []  Patient ill  []  Conflicting appointment  []  No transportation    []  Conflict with work  []  No reason given  [x]  Other:    Comments:  Family issue    Electronically signed by:  Krystal Khan, PT

## 2023-05-15 ENCOUNTER — HOSPITAL ENCOUNTER (OUTPATIENT)
Dept: PHYSICAL THERAPY | Age: 58
Setting detail: THERAPIES SERIES
Discharge: HOME | End: 2023-05-15
Payer: COMMERCIAL

## 2023-05-15 PROCEDURE — 97110 THERAPEUTIC EXERCISES: CPT | Performed by: PHYSICAL THERAPIST

## 2023-05-15 PROCEDURE — 97140 MANUAL THERAPY 1/> REGIONS: CPT | Performed by: PHYSICAL THERAPIST

## 2023-05-15 NOTE — FLOWSHEET NOTE
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]    Treatment diagnosis: S/p R BRIANA 3/6/23 Z96.649, difficulty walking R26.2                                    Insurance information: Medical [place], 40 visits PT, 0$ copay, no auth  Physician Information:  Lindsey Lobato MD  Has the plan of care been signed (Y/N):        []  Yes  [x]  No     Date of Patient follow up with Physician: 3 month follow up      Is this a Progress Report:     [x]  Yes  []  No        If Yes:  Date Range for reporting period:  Beginning 23  Ending 23    Progress report will be due (10 Rx or 30 days whichever is less):       Recertification will be due (POC Duration  / 90 days whichever is less): 12 weeks POC      Visit # Insurance Allowable Auth Required   In-person 9 40 visits []  Yes [x]  No    Telehealth   []  Yes []  No    Total          Functional Scale: LEFS: = 85%    Date assessed:  23   LEFS:   = 45% 23    Therapy Diagnosis/Practice Pattern:      Number of Comorbidities:  []0     []1-2    [x]3+    Latex Allergy:  [x]NO      []YES  Preferred Language for Healthcare:   [x]English       []other:      Pain level:  1/10     SUBJECTIVE:  Pt reports she has feeling better and able to get around easier. Pt is continuing to use no AD. OBJECTIVE: See eval  Observation: Pt enters clinic without use of AD. Pt demonstrates mild trendelenburg in R SLS.   Test measurements:    ROM LEFT RIGHT 23 R LE   HIP Flex AROM 95  PROM 68 AROM 115   HIP Abd AROM 25 PROM 25 AROM 55   HIP Ext   NT

## 2023-05-18 ENCOUNTER — HOSPITAL ENCOUNTER (OUTPATIENT)
Dept: PHYSICAL THERAPY | Age: 58
Setting detail: THERAPIES SERIES
Discharge: HOME | End: 2023-05-18
Payer: COMMERCIAL

## 2023-05-18 PROCEDURE — 97110 THERAPEUTIC EXERCISES: CPT | Performed by: PHYSICAL THERAPIST

## 2023-05-18 PROCEDURE — 97140 MANUAL THERAPY 1/> REGIONS: CPT | Performed by: PHYSICAL THERAPIST

## 2023-05-18 NOTE — FLOWSHEET NOTE
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]    Treatment diagnosis: S/p R BRIANA 3/6/23 Z96.649, difficulty walking R26.2                                    Insurance information: Medical [employer], 40 visits PT, 0$ copay, no auth  Physician Information:  Virginia Mora MD  Has the plan of care been signed (Y/N):        []  Yes  [x]  No     Date of Patient follow up with Physician: 3 month follow up      Is this a Progress Report:     [x]  Yes  []  No        If Yes:  Date Range for reporting period:  Beginning 23  Ending 23    Progress report will be due (10 Rx or 30 days whichever is less):       Recertification will be due (POC Duration  / 90 days whichever is less): 12 weeks POC      Visit # Insurance Allowable Auth Required   In-person 10 40 visits []  Yes [x]  No    Telehealth   []  Yes []  No    Total          Functional Scale: LEFS: = 85%    Date assessed:  23   LEFS:  44 = 45% 23    Therapy Diagnosis/Practice Pattern:      Number of Comorbidities:  []0     []1-2    [x]3+    Latex Allergy:  [x]NO      []YES  Preferred Language for Healthcare:   [x]English       []other:      Pain level:  1/10     SUBJECTIVE:  Pt reports increased soreness and aching sensation in her hip following last session after sitting prolonged period with drawing. OBJECTIVE: See eval  Observation: Pt enters clinic without use of AD. Pt demonstrates mild trendelenburg in R SLS.   Test measurements:    ROM LEFT RIGHT 23 R LE   HIP Flex AROM 95  PROM 68 AROM 115   HIP Abd AROM 25

## 2023-05-22 ENCOUNTER — HOSPITAL ENCOUNTER (OUTPATIENT)
Dept: PHYSICAL THERAPY | Age: 58
Setting detail: THERAPIES SERIES
Discharge: HOME | End: 2023-05-22
Payer: COMMERCIAL

## 2023-05-22 ENCOUNTER — APPOINTMENT (OUTPATIENT)
Dept: PHYSICAL THERAPY | Age: 58
End: 2023-05-22
Payer: COMMERCIAL

## 2023-05-22 NOTE — FLOWSHEET NOTE
[facility],  [address]        Physical Therapy  Cancellation/No-show Note  Patient Name:  Eric Dent  :  1965   Date:  2023  Cancelled visits to date: 5  No-shows to date: 0    For today's appointment patient:  [x]  Cancelled  []  Rescheduled appointment  []  No-show     Reason given by patient:  []  Patient ill  []  Conflicting appointment  []  No transportation    []  Conflict with work  []  No reason given  [x]  Other:    Comments:  rescheduled for Tuesday    Electronically signed by:  Luz Jackson PT

## 2023-05-23 ENCOUNTER — HOSPITAL ENCOUNTER (OUTPATIENT)
Dept: PHYSICAL THERAPY | Age: 58
Setting detail: THERAPIES SERIES
Discharge: HOME | End: 2023-05-23
Payer: COMMERCIAL

## 2023-05-23 PROCEDURE — 97140 MANUAL THERAPY 1/> REGIONS: CPT | Performed by: PHYSICAL THERAPIST

## 2023-05-23 PROCEDURE — 97110 THERAPEUTIC EXERCISES: CPT | Performed by: PHYSICAL THERAPIST

## 2023-05-23 NOTE — FLOWSHEET NOTE
Met: [] Not Met: [] Adjusted  3. Patient will demonstrate an increase in Strength to 5/5 proximal hip strength and control to allow for proper functional mobility to ambulate community distances without an AD. [x] Progressing: [] Met: [] Not Met: [] Adjusted  4. Patient will return to household chores including cleaning and vacuuming without pain no greater than 1-2/10 pain. [] Progressing: [x] Met: [] Not Met: [] Adjusted  5. Pt will demonstrate the ability to return water aerobics class at the [facility] for 30-45 min with no pain. [x] Progressing: [] Met: [] Not Met: [] Adjusted      Progression Towards Functional goals:  [x] Patient is progressing as expected towards functional goals listed. [] Progression is slowed due to complexities listed. [] Progression has been slowed due to co-morbidities. [] Plan just implemented, too soon to assess goals progression  [] Other:         Overall Progression Towards Functional goals/ Treatment Progress Update:  [x] Patient is progressing as expected towards functional goals listed. [] Progression is slowed due to complexities/Impairments listed. [] Progression has been slowed due to co-morbidities. [] Plan just implemented, too soon to assess goals progression <30days   [] Goals require adjustment due to lack of progress  [] Patient is not progressing as expected and requires additional follow up with physician  [] Other    Prognosis for POC: [x] Good [] Fair  [] Poor      Patient requires continued skilled intervention: [x] Yes  [] No    Treatment/Activity Tolerance:  [x] Patient able to complete treatment  [] Patient limited by fatigue  [] Patient limited by pain    [] Patient limited by other medical complications  [] Other:     ASSESSMENT: Pt  encouraged to use reciprocal gait on stairs. Pt demonstrated good mechanics with SANTY on 8 inch step. Pt demonstrated good balance. PLAN: Pt will continue 1-2x a week to address remaining deficits.   [x] Continue

## 2023-05-25 ENCOUNTER — APPOINTMENT (OUTPATIENT)
Dept: PHYSICAL THERAPY | Age: 58
End: 2023-05-25
Payer: COMMERCIAL

## 2023-06-01 ENCOUNTER — HOSPITAL ENCOUNTER (OUTPATIENT)
Dept: PHYSICAL THERAPY | Age: 58
Setting detail: THERAPIES SERIES
Discharge: HOME | End: 2023-06-01
Payer: COMMERCIAL

## 2023-06-01 PROCEDURE — 97140 MANUAL THERAPY 1/> REGIONS: CPT | Performed by: PHYSICAL THERAPIST

## 2023-06-01 PROCEDURE — 97110 THERAPEUTIC EXERCISES: CPT | Performed by: PHYSICAL THERAPIST

## 2023-06-01 NOTE — FLOWSHEET NOTE
Met: [] Not Met: [] Adjusted  3. Patient will demonstrate an increase in Strength to 5/5 proximal hip strength and control to allow for proper functional mobility to ambulate community distances without an AD. [x] Progressing: [] Met: [] Not Met: [] Adjusted  4. Patient will return to household chores including cleaning and vacuuming without pain no greater than 1-2/10 pain. [] Progressing: [x] Met: [] Not Met: [] Adjusted  5. Pt will demonstrate the ability to return water aerobics class at the [facility] for 30-45 min with no pain. [x] Progressing: [] Met: [] Not Met: [] Adjusted      Progression Towards Functional goals:  [x] Patient is progressing as expected towards functional goals listed. [] Progression is slowed due to complexities listed. [] Progression has been slowed due to co-morbidities. [] Plan just implemented, too soon to assess goals progression  [] Other:         Overall Progression Towards Functional goals/ Treatment Progress Update:  [x] Patient is progressing as expected towards functional goals listed. [] Progression is slowed due to complexities/Impairments listed. [] Progression has been slowed due to co-morbidities. [] Plan just implemented, too soon to assess goals progression <30days   [] Goals require adjustment due to lack of progress  [] Patient is not progressing as expected and requires additional follow up with physician  [] Other    Prognosis for POC: [x] Good [] Fair  [] Poor      Patient requires continued skilled intervention: [x] Yes  [] No    Treatment/Activity Tolerance:  [x] Patient able to complete treatment  [] Patient limited by fatigue  [] Patient limited by pain    [] Patient limited by other medical complications  [] Other:     ASSESSMENT: Reviewed reciprocal pattern ascending/descending stairs with bilateral railing and use of 1 railing, pt demonstrated good control. Progressed to SLS on airex pad, pt required SBA and cues for glute activation.  Pt is

## 2023-06-05 ENCOUNTER — HOSPITAL ENCOUNTER (OUTPATIENT)
Dept: PHYSICAL THERAPY | Age: 58
Setting detail: THERAPIES SERIES
Discharge: HOME | End: 2023-06-05
Payer: COMMERCIAL

## 2023-06-05 NOTE — FLOWSHEET NOTE
[facility],  [address]        Physical Therapy  Cancellation/No-show Note  Patient Name:  Justin Beavers  :  1965   Date:  2023  Cancelled visits to date: 10  No-shows to date: 0    For today's appointment patient:  [x]  Cancelled  []  Rescheduled appointment  []  No-show     Reason given by patient:  []  Patient ill  [x]  Conflicting appointment  []  No transportation    []  Conflict with work  []  No reason given  []  Other:    Comments:      Electronically signed by:  Chris Mas, PT

## 2023-06-21 ENCOUNTER — HOSPITAL ENCOUNTER (OUTPATIENT)
Dept: PHYSICAL THERAPY | Age: 58
Setting detail: THERAPIES SERIES
Discharge: HOME | End: 2023-06-21
Payer: COMMERCIAL

## 2023-06-21 NOTE — FLOWSHEET NOTE
[facility],  [address]        Physical Therapy  Cancellation/No-show Note  Patient Name:  Price Kent  :  1965   Date:  2023  Cancelled visits to date: 7  No-shows to date: 0    For today's appointment patient:  [x]  Cancelled  []  Rescheduled appointment  []  No-show     Reason given by patient:  [x]  Patient ill  []  Conflicting appointment  []  No transportation    []  Conflict with work  []  No reason given  []  Other:    Comments:  Allergies    Electronically signed by:  Ivone Neumann, PT

## 2023-06-22 ENCOUNTER — OFFICE VISIT (OUTPATIENT)
Dept: FAMILY MEDICINE CLINIC | Age: 58
End: 2023-06-22
Payer: COMMERCIAL

## 2023-06-22 VITALS
HEIGHT: 62 IN | DIASTOLIC BLOOD PRESSURE: 60 MMHG | OXYGEN SATURATION: 98 % | TEMPERATURE: 98 F | WEIGHT: 215.8 LBS | HEART RATE: 83 BPM | BODY MASS INDEX: 39.71 KG/M2 | SYSTOLIC BLOOD PRESSURE: 112 MMHG

## 2023-06-22 DIAGNOSIS — Z12.31 ENCOUNTER FOR SCREENING MAMMOGRAM FOR MALIGNANT NEOPLASM OF BREAST: ICD-10-CM

## 2023-06-22 DIAGNOSIS — I10 PRIMARY HYPERTENSION: ICD-10-CM

## 2023-06-22 DIAGNOSIS — Z96.641 STATUS POST TOTAL REPLACEMENT OF RIGHT HIP: ICD-10-CM

## 2023-06-22 DIAGNOSIS — Z12.4 CERVICAL CANCER SCREENING: ICD-10-CM

## 2023-06-22 DIAGNOSIS — E78.2 MIXED HYPERLIPIDEMIA: ICD-10-CM

## 2023-06-22 DIAGNOSIS — Z00.00 ENCOUNTER FOR WELL ADULT EXAM WITHOUT ABNORMAL FINDINGS: ICD-10-CM

## 2023-06-22 DIAGNOSIS — E55.9 VITAMIN D DEFICIENCY: ICD-10-CM

## 2023-06-22 DIAGNOSIS — Z12.11 COLON CANCER SCREENING: ICD-10-CM

## 2023-06-22 DIAGNOSIS — D50.9 IRON DEFICIENCY ANEMIA, UNSPECIFIED IRON DEFICIENCY ANEMIA TYPE: ICD-10-CM

## 2023-06-22 DIAGNOSIS — Z00.00 ENCOUNTER FOR WELL ADULT EXAM WITHOUT ABNORMAL FINDINGS: Primary | ICD-10-CM

## 2023-06-22 PROBLEM — M67.449 MUCOUS CYST OF FINGER: Status: RESOLVED | Noted: 2021-07-21 | Resolved: 2023-06-22

## 2023-06-22 PROBLEM — M70.61 TROCHANTERIC BURSITIS OF RIGHT HIP: Status: RESOLVED | Noted: 2020-03-27 | Resolved: 2023-06-22

## 2023-06-22 LAB
BASOPHILS # BLD: 0.1 K/UL (ref 0–0.2)
BASOPHILS NFR BLD: 0.8 %
DEPRECATED RDW RBC AUTO: 14.2 % (ref 12.4–15.4)
EOSINOPHIL # BLD: 0.1 K/UL (ref 0–0.6)
EOSINOPHIL NFR BLD: 1.7 %
HCT VFR BLD AUTO: 37.5 % (ref 36–48)
HGB BLD-MCNC: 12.3 G/DL (ref 12–16)
LYMPHOCYTES # BLD: 1.9 K/UL (ref 1–5.1)
LYMPHOCYTES NFR BLD: 28 %
MCH RBC QN AUTO: 28.7 PG (ref 26–34)
MCHC RBC AUTO-ENTMCNC: 32.7 G/DL (ref 31–36)
MCV RBC AUTO: 87.7 FL (ref 80–100)
MONOCYTES # BLD: 0.5 K/UL (ref 0–1.3)
MONOCYTES NFR BLD: 7.7 %
NEUTROPHILS # BLD: 4.3 K/UL (ref 1.7–7.7)
NEUTROPHILS NFR BLD: 61.8 %
PLATELET # BLD AUTO: 258 K/UL (ref 135–450)
PMV BLD AUTO: 8.6 FL (ref 5–10.5)
RBC # BLD AUTO: 4.28 M/UL (ref 4–5.2)
WBC # BLD AUTO: 6.9 K/UL (ref 4–11)

## 2023-06-22 PROCEDURE — 99396 PREV VISIT EST AGE 40-64: CPT | Performed by: FAMILY MEDICINE

## 2023-06-22 PROCEDURE — 3078F DIAST BP <80 MM HG: CPT | Performed by: FAMILY MEDICINE

## 2023-06-22 PROCEDURE — 3074F SYST BP LT 130 MM HG: CPT | Performed by: FAMILY MEDICINE

## 2023-06-22 ASSESSMENT — ENCOUNTER SYMPTOMS
TROUBLE SWALLOWING: 0
COUGH: 0
BACK PAIN: 0
NAUSEA: 0
BLOOD IN STOOL: 0
ABDOMINAL PAIN: 0
COLOR CHANGE: 0
VOMITING: 0
CONSTIPATION: 0
DIARRHEA: 0
SHORTNESS OF BREATH: 0

## 2023-06-22 NOTE — PROGRESS NOTES
Well Adult Note  Name: Kasia Santos Date: 2023   MRN: [id number] Sex: Female   Age: 65 y.o. Ethnicity: Non- / Non    : 1965 Race: White (non-)      Price Kent is here for well adult exam.  History:    Saw Dr. Mederos Gave for a THR, feels like a different person than a year ago   Has changed habits, more motivated, more longer in constant pain. PT once weekly     Patient's last menstrual period was 2011. menstrual cycle: n/a  Last PAP: years   Follows with gynecology? No  Requests a referral     Last Mammogram: yes -   Family Hx of ovarian, breast, or uterine cancer: no new    Previous DEXA scan: n/a    Previous Colonoscopy: no  BRBR, unexplained WL, bloating, abd pain: No  Family Hx of Colon Ca:  no new     Due for second shingles vaccine    Review of Systems   Constitutional:  Negative for activity change, appetite change, chills, diaphoresis, fatigue, fever and unexpected weight change. HENT:  Negative for ear pain, hearing loss and trouble swallowing. Eyes:  Negative for visual disturbance. Respiratory:  Negative for cough and shortness of breath. Cardiovascular:  Negative for chest pain, palpitations and leg swelling. Gastrointestinal:  Negative for abdominal pain, blood in stool, constipation, diarrhea, nausea and vomiting. Genitourinary:  Negative for decreased urine volume, difficulty urinating, dysuria, flank pain, hematuria and urgency. Musculoskeletal:  Positive for arthralgias. Negative for back pain. Skin:  Negative for color change and rash. Neurological:  Negative for dizziness, weakness, light-headedness, numbness and headaches. Psychiatric/Behavioral:  Negative for dysphoric mood and sleep disturbance. The patient is not nervous/anxious. No Known Allergies      Prior to Visit Medications    Medication Sig Taking?  Authorizing Provider   FLUoxetine (PROZAC) 20 MG capsule TAKE 3 CAPSULES BY MOUTH EVERY DAY

## 2023-06-23 LAB
25(OH)D3 SERPL-MCNC: 40.4 NG/ML
CHOLEST SERPL-MCNC: 155 MG/DL (ref 0–199)
EST. AVERAGE GLUCOSE BLD GHB EST-MCNC: 105.4 MG/DL
FERRITIN SERPL IA-MCNC: 63 NG/ML (ref 15–150)
HBA1C MFR BLD: 5.3 %
HDLC SERPL-MCNC: 56 MG/DL (ref 40–60)
IRON SATN MFR SERPL: 19 % (ref 15–50)
IRON SERPL-MCNC: 56 UG/DL (ref 37–145)
LDLC SERPL CALC-MCNC: 87 MG/DL
TIBC SERPL-MCNC: 298 UG/DL (ref 260–445)
TRIGL SERPL-MCNC: 59 MG/DL (ref 0–150)
TSH SERPL DL<=0.005 MIU/L-ACNC: 2.1 UIU/ML (ref 0.27–4.2)
VLDLC SERPL CALC-MCNC: 12 MG/DL

## 2023-06-28 ENCOUNTER — HOSPITAL ENCOUNTER (OUTPATIENT)
Dept: PHYSICAL THERAPY | Age: 58
Setting detail: THERAPIES SERIES
Discharge: HOME | End: 2023-06-28
Payer: COMMERCIAL

## 2023-06-29 DIAGNOSIS — M54.50 CHRONIC RIGHT-SIDED LOW BACK PAIN, UNSPECIFIED WHETHER SCIATICA PRESENT: ICD-10-CM

## 2023-06-29 DIAGNOSIS — M54.50 ACUTE LEFT-SIDED LOW BACK PAIN, UNSPECIFIED WHETHER SCIATICA PRESENT: ICD-10-CM

## 2023-06-29 DIAGNOSIS — G89.29 CHRONIC RIGHT-SIDED LOW BACK PAIN, UNSPECIFIED WHETHER SCIATICA PRESENT: ICD-10-CM

## 2023-06-29 RX ORDER — METHOCARBAMOL 500 MG/1
500-1000 TABLET, FILM COATED ORAL 3 TIMES DAILY PRN
Qty: 60 TABLET | Refills: 0 | Status: SHIPPED | OUTPATIENT
Start: 2023-06-29

## 2023-06-30 RX ORDER — LISINOPRIL 20 MG/1
TABLET ORAL
Qty: 90 TABLET | Refills: 1 | Status: SHIPPED | OUTPATIENT
Start: 2023-06-30

## 2023-07-12 ENCOUNTER — HOSPITAL ENCOUNTER (OUTPATIENT)
Dept: PHYSICAL THERAPY | Age: 58
Setting detail: THERAPIES SERIES
Discharge: HOME | End: 2023-07-12

## 2023-07-12 NOTE — FLOWSHEET NOTE
[facility], [address] [place] [address]  [address] [place] [address], [address] [place] [address]        Physical Therapy  Cancellation/No-show Note  Patient Name:  Fermín Valiente  :  1965   Date:  2023  Cancelled visits to date: 5  No-shows to date: 0    For today's appointment patient:  [x]  Cancelled  []  Rescheduled appointment  []  No-show     Reason given by patient:  []  Patient ill  []  Conflicting appointment  []  No transportation    []  Conflict with work  []  No reason given  []  Other:    Comments:  No longer feels needing PT.   Ok to d/c    Electronically signed by:  Cayetano Lazo PT

## 2023-07-25 ENCOUNTER — HOSPITAL ENCOUNTER (OUTPATIENT)
Dept: WOMENS IMAGING | Age: 58
Discharge: HOME | End: 2023-07-25
Attending: FAMILY MEDICINE
Payer: COMMERCIAL

## 2023-07-25 DIAGNOSIS — Z12.31 SCREENING MAMMOGRAM, ENCOUNTER FOR: ICD-10-CM

## 2023-07-25 DIAGNOSIS — Z00.00 ENCOUNTER FOR WELL ADULT EXAM WITHOUT ABNORMAL FINDINGS: ICD-10-CM

## 2023-07-25 DIAGNOSIS — Z12.31 ENCOUNTER FOR SCREENING MAMMOGRAM FOR MALIGNANT NEOPLASM OF BREAST: ICD-10-CM

## 2023-07-25 PROCEDURE — 77063 BREAST TOMOSYNTHESIS BI: CPT

## 2023-08-08 ENCOUNTER — TELEMEDICINE (OUTPATIENT)
Dept: PRIMARY CARE CLINIC | Age: 58
End: 2023-08-08

## 2023-08-08 ENCOUNTER — TELEPHONE (OUTPATIENT)
Dept: PRIMARY CARE CLINIC | Age: 58
End: 2023-08-08

## 2023-08-08 DIAGNOSIS — U07.1 COVID-19: Primary | ICD-10-CM

## 2023-08-08 ASSESSMENT — PATIENT HEALTH QUESTIONNAIRE - PHQ9
5. POOR APPETITE OR OVEREATING: 0
SUM OF ALL RESPONSES TO PHQ QUESTIONS 1-9: 0
SUM OF ALL RESPONSES TO PHQ9 QUESTIONS 1 & 2: 0
10. IF YOU CHECKED OFF ANY PROBLEMS, HOW DIFFICULT HAVE THESE PROBLEMS MADE IT FOR YOU TO DO YOUR WORK, TAKE CARE OF THINGS AT HOME, OR GET ALONG WITH OTHER PEOPLE: 0
3. TROUBLE FALLING OR STAYING ASLEEP: 0
SUM OF ALL RESPONSES TO PHQ QUESTIONS 1-9: 0
2. FEELING DOWN, DEPRESSED OR HOPELESS: 0
7. TROUBLE CONCENTRATING ON THINGS, SUCH AS READING THE NEWSPAPER OR WATCHING TELEVISION: 0
1. LITTLE INTEREST OR PLEASURE IN DOING THINGS: 0
9. THOUGHTS THAT YOU WOULD BE BETTER OFF DEAD, OR OF HURTING YOURSELF: 0
SUM OF ALL RESPONSES TO PHQ QUESTIONS 1-9: 0
SUM OF ALL RESPONSES TO PHQ QUESTIONS 1-9: 0
6. FEELING BAD ABOUT YOURSELF - OR THAT YOU ARE A FAILURE OR HAVE LET YOURSELF OR YOUR FAMILY DOWN: 0
8. MOVING OR SPEAKING SO SLOWLY THAT OTHER PEOPLE COULD HAVE NOTICED. OR THE OPPOSITE, BEING SO FIGETY OR RESTLESS THAT YOU HAVE BEEN MOVING AROUND A LOT MORE THAN USUAL: 0
4. FEELING TIRED OR HAVING LITTLE ENERGY: 0

## 2023-08-08 ASSESSMENT — ANXIETY QUESTIONNAIRES
GAD7 TOTAL SCORE: 0
2. NOT BEING ABLE TO STOP OR CONTROL WORRYING: 0
5. BEING SO RESTLESS THAT IT IS HARD TO SIT STILL: 0
6. BECOMING EASILY ANNOYED OR IRRITABLE: 0
IF YOU CHECKED OFF ANY PROBLEMS ON THIS QUESTIONNAIRE, HOW DIFFICULT HAVE THESE PROBLEMS MADE IT FOR YOU TO DO YOUR WORK, TAKE CARE OF THINGS AT HOME, OR GET ALONG WITH OTHER PEOPLE: NOT DIFFICULT AT ALL
7. FEELING AFRAID AS IF SOMETHING AWFUL MIGHT HAPPEN: 0
4. TROUBLE RELAXING: 0
3. WORRYING TOO MUCH ABOUT DIFFERENT THINGS: 0
1. FEELING NERVOUS, ANXIOUS, OR ON EDGE: 0

## 2023-08-08 NOTE — PATIENT INSTRUCTIONS
PAXLOVID consists of 2 medicines: nirmatrelvir and ritonavir. Nirmatrelvir must be coadministered with ritonavir. Take 2 tablets of nirmatrelvir (150 mg) with 1 tablet of ritonavir (100 mg) by mouth twice daily (in the morning and in the evening) for 5 days. For each dose, all 3 tablets should be taken at the same time.

## 2023-08-08 NOTE — TELEPHONE ENCOUNTER
Patient would likely benefit from paxlovid.  Can we please have her schedule a video visit today with one of the residents

## 2023-08-08 NOTE — TELEPHONE ENCOUNTER
Patient called after being diagnosed positive for Covid. Patient has chills, fever, congestion, bad cough with phlegm and sore throat. Patient is requesting if medication can be prescribed.   Kayla Busch  [phone] (home)

## 2023-09-05 DIAGNOSIS — F33.0 MILD EPISODE OF RECURRENT MAJOR DEPRESSIVE DISORDER (HCC): ICD-10-CM

## 2023-09-05 RX ORDER — FLUOXETINE HYDROCHLORIDE 20 MG/1
CAPSULE ORAL
Qty: 270 CAPSULE | Refills: 0 | Status: SHIPPED | OUTPATIENT
Start: 2023-09-05

## 2023-09-05 NOTE — TELEPHONE ENCOUNTER
.Refill Request     CONFIRM preferred pharmacy with the patient. If Mail Order Rx - Pend for 90 day refill. Last Seen: Last Seen Department: 6/22/2023  Last Seen by PCP: Visit date not found    Last Written: 4/5/23 270 with 0     If no future appointment scheduled:  Review the last OV with PCP and review information for follow-up visit,  Route STAFF MESSAGE with patient name to the [facility] for scheduling with the following inform4/5/23 270 with 0ation:            -  Timing of next visit           -  Visit type ie Physical, OV, etc           -  Diagnoses/Reason ie. COPD, HTN - Do not use MEDICATION, Follow-up or CHECK UP - Give reason for visit      Next Appointment:   Future Appointments   Date Time Provider [facility] [address]   9/21/2023 10:20 AM DO NADIRA Olguin [facility]   9/26/2023  3:30 PM Alessio Lugo MD [address] [facility]       Message sent to [address] [place] [address] to schedule appt with patient?   N/A      Requested Prescriptions     Pending Prescriptions Disp Refills    FLUoxetine (PROZAC) 20 MG capsule [Pharmacy Med Name: FLUOXETINE HCL 20 MG CAPSULE] 270 capsule 0     Sig: TAKE 3 CAPSULES BY MOUTH EVERY DAY

## 2023-09-06 DIAGNOSIS — G89.29 CHRONIC RIGHT-SIDED LOW BACK PAIN, UNSPECIFIED WHETHER SCIATICA PRESENT: ICD-10-CM

## 2023-09-06 DIAGNOSIS — M54.50 CHRONIC RIGHT-SIDED LOW BACK PAIN, UNSPECIFIED WHETHER SCIATICA PRESENT: ICD-10-CM

## 2023-09-06 DIAGNOSIS — M54.50 ACUTE LEFT-SIDED LOW BACK PAIN, UNSPECIFIED WHETHER SCIATICA PRESENT: ICD-10-CM

## 2023-09-06 NOTE — TELEPHONE ENCOUNTER
.Refill Request     CONFIRM preferred pharmacy with the patient. If Mail Order Rx - Pend for 90 day refill. Last Seen: Last Seen Department: 6/22/2023  Last Seen by PCP: 2/13/2023    Last Written: 6-29-23 60 with 0     If no future appointment scheduled:  Review the last OV with PCP and review information for follow-up visit,  Route STAFF MESSAGE with patient name to the [facility] for scheduling with the following information:            -  Timing of next visit           -  Visit type ie Physical, OV, etc           -  Diagnoses/Reason ie. COPD, HTN - Do not use MEDICATION, Follow-up or CHECK UP - Give reason for visit      Next Appointment:   Future Appointments   Date Time Provider [address] [place] [address]   9/21/2023 10:20 AM DO NADIRA [facility]   9/26/2023  3:30 PM Marybeth Luna MD [address] [facility]       Message sent to [address] [place] [address] to schedule appt with patient?   N/A      Requested Prescriptions     Pending Prescriptions Disp Refills    methocarbamol (ROBAXIN) 500 MG tablet [Pharmacy Med Name: METHOCARBAMOL 500 MG TABLET] 60 tablet 0     Sig: TAKE 1-2 TABLETS BY MOUTH 3 TIMES DAILY AS NEEDED (BACK PAIN)

## 2023-09-07 DIAGNOSIS — M54.50 CHRONIC RIGHT-SIDED LOW BACK PAIN, UNSPECIFIED WHETHER SCIATICA PRESENT: ICD-10-CM

## 2023-09-07 DIAGNOSIS — M54.50 ACUTE LEFT-SIDED LOW BACK PAIN, UNSPECIFIED WHETHER SCIATICA PRESENT: ICD-10-CM

## 2023-09-07 DIAGNOSIS — G89.29 CHRONIC RIGHT-SIDED LOW BACK PAIN, UNSPECIFIED WHETHER SCIATICA PRESENT: ICD-10-CM

## 2023-09-07 RX ORDER — METHOCARBAMOL 500 MG/1
500-1000 TABLET, FILM COATED ORAL 3 TIMES DAILY PRN
Qty: 60 TABLET | Refills: 0 | OUTPATIENT
Start: 2023-09-07

## 2023-09-07 RX ORDER — METHOCARBAMOL 500 MG/1
500-1000 TABLET, FILM COATED ORAL 3 TIMES DAILY PRN
Qty: 60 TABLET | Refills: 0 | Status: SHIPPED | OUTPATIENT
Start: 2023-09-07

## 2023-09-07 NOTE — TELEPHONE ENCOUNTER
Refill Request       Last Seen: Last Seen Department: 8/8/2023  Last Seen by PCP: 2/13/2023    Last Written: 9/7/2023 60 with 0    Next Appointment:   Future Appointments   Date Time Provider [address]   9/21/2023 10:20 AM DO NADIRA Christian [facility] [place]   9/26/2023  3:30 PM Nedra [facility]           Requested Prescriptions     Pending Prescriptions Disp Refills    methocarbamol (ROBAXIN) 500 MG tablet 60 tablet 0     Sig: Take 1-2 tablets by mouth 3 times daily as needed (back pain)

## 2023-09-18 ENCOUNTER — TELEPHONE (OUTPATIENT)
Dept: PRIMARY CARE CLINIC | Age: 58
End: 2023-09-18

## 2023-09-18 NOTE — TELEPHONE ENCOUNTER
Patient called and requested a refill for the  atorvastatin (LIPITOR) 10 MG tablet. Patient said that she is out of the medication.     Next appointment: 9/26/23 with Dr. Tessa Patel at 3:30 pm.     Please send to:  [facility]/PHARMACY [address] [place], [place] - [address] [facility]  [phone] (home)

## 2023-09-18 NOTE — TELEPHONE ENCOUNTER
Refill Request       Last Seen: Last Seen Department: 8/8/2023  Last Seen by PCP: 2/13/2023    Last Written: 12/19/2022 90 with 1 refill     Next Appointment:   Future Appointments   Date Time Provider [address]   9/21/2023 10:20 AM DO NADIRA Recio [facility] [place]   9/26/2023  3:30 PM Elayne Cantu MD [facility] AND RES [place]             Requested Prescriptions     Pending Prescriptions Disp Refills    atorvastatin (LIPITOR) 10 MG tablet 90 tablet 1     Sig: Take 1 tablet by mouth daily

## 2023-09-19 RX ORDER — ATORVASTATIN CALCIUM 10 MG/1
10 TABLET, FILM COATED ORAL DAILY
Qty: 90 TABLET | Refills: 1 | Status: SHIPPED | OUTPATIENT
Start: 2023-09-19

## 2023-09-21 ENCOUNTER — OFFICE VISIT (OUTPATIENT)
Dept: OBGYN CLINIC | Age: 58
End: 2023-09-21
Payer: COMMERCIAL

## 2023-09-21 VITALS
WEIGHT: 220.4 LBS | HEART RATE: 84 BPM | BODY MASS INDEX: 40.56 KG/M2 | HEIGHT: 62 IN | TEMPERATURE: 98.2 F | DIASTOLIC BLOOD PRESSURE: 78 MMHG | SYSTOLIC BLOOD PRESSURE: 124 MMHG

## 2023-09-21 DIAGNOSIS — Z12.4 PAP SMEAR FOR CERVICAL CANCER SCREENING: ICD-10-CM

## 2023-09-21 DIAGNOSIS — N94.10 DYSPAREUNIA IN FEMALE: ICD-10-CM

## 2023-09-21 DIAGNOSIS — Z01.419 ENCNTR FOR GYN EXAM (GENERAL) (ROUTINE) W/O ABN FINDINGS: Primary | ICD-10-CM

## 2023-09-21 PROCEDURE — 3074F SYST BP LT 130 MM HG: CPT | Performed by: OBSTETRICS & GYNECOLOGY

## 2023-09-21 PROCEDURE — 3078F DIAST BP <80 MM HG: CPT | Performed by: OBSTETRICS & GYNECOLOGY

## 2023-09-21 PROCEDURE — 99386 PREV VISIT NEW AGE 40-64: CPT | Performed by: OBSTETRICS & GYNECOLOGY

## 2023-09-21 SDOH — HEALTH STABILITY: PHYSICAL HEALTH: ON AVERAGE, HOW MANY DAYS PER WEEK DO YOU ENGAGE IN MODERATE TO STRENUOUS EXERCISE (LIKE A BRISK WALK)?: 2 DAYS

## 2023-09-21 SDOH — HEALTH STABILITY: PHYSICAL HEALTH: ON AVERAGE, HOW MANY MINUTES DO YOU ENGAGE IN EXERCISE AT THIS LEVEL?: 30 MIN

## 2023-09-21 ASSESSMENT — SOCIAL DETERMINANTS OF HEALTH (SDOH)
WITHIN THE LAST YEAR, HAVE YOU BEEN KICKED, HIT, SLAPPED, OR OTHERWISE PHYSICALLY HURT BY YOUR PARTNER OR EX-PARTNER?: NO
WITHIN THE LAST YEAR, HAVE YOU BEEN HUMILIATED OR EMOTIONALLY ABUSED IN OTHER WAYS BY YOUR PARTNER OR EX-PARTNER?: NO
WITHIN THE LAST YEAR, HAVE YOU BEEN KICKED, HIT, SLAPPED, OR OTHERWISE PHYSICALLY HURT BY YOUR PARTNER OR EX-PARTNER?: NO
WITHIN THE LAST YEAR, HAVE YOU BEEN AFRAID OF YOUR PARTNER OR EX-PARTNER?: NO
WITHIN THE LAST YEAR, HAVE TO BEEN RAPED OR FORCED TO HAVE ANY KIND OF SEXUAL ACTIVITY BY YOUR PARTNER OR EX-PARTNER?: NO
WITHIN THE LAST YEAR, HAVE YOU BEEN HUMILIATED OR EMOTIONALLY ABUSED IN OTHER WAYS BY YOUR PARTNER OR EX-PARTNER?: NO
WITHIN THE LAST YEAR, HAVE YOU BEEN AFRAID OF YOUR PARTNER OR EX-PARTNER?: NO
WITHIN THE LAST YEAR, HAVE TO BEEN RAPED OR FORCED TO HAVE ANY KIND OF SEXUAL ACTIVITY BY YOUR PARTNER OR EX-PARTNER?: NO

## 2023-09-21 NOTE — PROGRESS NOTES
Annual Exam      CC:   Chief Complaint   Patient presents with    New Patient       HPI:  65 y.o. [id number] presents for her gynecologic annual exam.    Patient seen and examined. Doing well today without complaints. Reports menses stopped in . Denies bleeding since that time. Medical history significant for degenerative joint disease, scoliosis, hypertension, prediabetes, hyperlipidemia. Has had improvement in several of these conditions since 100 pound weight loss. Surgical history includes hip replacement, pilar cyst removal and wisdom teeth    Obstetric history significant for  x2. Health Maintenance:  Birth control: Menopause  Safe relationship: Yes  for 28 years  Diet: Weight watchers  Exercise: Walking, active, water aerobics    Screening:  Last pap smear:   History of abnormal pap smears: Denies  Mammogram: 2023 - negative  Colonoscopy: Has scheduled in November with Dr. Castro Arch:  Flu vaccine: Has had  COVID-19 vaccine: Has had    Review of Systems:   Review of Systems   Constitutional:  Negative for chills and fever. HENT:  Negative for congestion and sore throat. Respiratory:  Negative for cough, chest tightness and shortness of breath. Cardiovascular:  Negative for chest pain and palpitations. Gastrointestinal:  Negative for abdominal pain, constipation, diarrhea, nausea and vomiting. Genitourinary:  Negative for dysuria, frequency, menstrual problem, pelvic pain, urgency and vaginal discharge. Neurological:  Negative for dizziness and headaches.    Breast: Denies skin changes, nipple discharge, lesions, dimpling, tenderness or palpable masses     Primary Care Physician: Alessio Lugo MD    Obstetric History  OB History    Para Term  AB Living   2 2 2     2   SAB IAB Ectopic Molar Multiple Live Births             2      # Outcome Date GA Lbr Michael/2nd Weight Sex Delivery Anes PTL Lv   2 Term      Vag-Spont   KAYLIN   1 Term

## 2023-09-22 LAB
HPV HR 12 DNA SPEC QL NAA+PROBE: NOT DETECTED
HPV16 DNA SPEC QL NAA+PROBE: NOT DETECTED
HPV16+18+H RISK 12 DNA SPEC-IMP: NORMAL
HPV18 DNA SPEC QL NAA+PROBE: NOT DETECTED

## 2023-09-26 ENCOUNTER — OFFICE VISIT (OUTPATIENT)
Dept: PRIMARY CARE CLINIC | Age: 58
End: 2023-09-26
Payer: COMMERCIAL

## 2023-09-26 VITALS
SYSTOLIC BLOOD PRESSURE: 110 MMHG | WEIGHT: 225 LBS | TEMPERATURE: 98.2 F | OXYGEN SATURATION: 98 % | HEART RATE: 73 BPM | HEIGHT: 62 IN | BODY MASS INDEX: 41.41 KG/M2 | DIASTOLIC BLOOD PRESSURE: 64 MMHG

## 2023-09-26 DIAGNOSIS — Z76.89 ENCOUNTER TO ESTABLISH CARE: ICD-10-CM

## 2023-09-26 DIAGNOSIS — F33.0 MILD EPISODE OF RECURRENT MAJOR DEPRESSIVE DISORDER (HCC): ICD-10-CM

## 2023-09-26 DIAGNOSIS — E78.2 MIXED HYPERLIPIDEMIA: ICD-10-CM

## 2023-09-26 DIAGNOSIS — I10 PRIMARY HYPERTENSION: Primary | ICD-10-CM

## 2023-09-26 DIAGNOSIS — Z86.39 HISTORY OF VITAMIN D DEFICIENCY: ICD-10-CM

## 2023-09-26 PROCEDURE — 99214 OFFICE O/P EST MOD 30 MIN: CPT | Performed by: STUDENT IN AN ORGANIZED HEALTH CARE EDUCATION/TRAINING PROGRAM

## 2023-09-26 PROCEDURE — G8417 CALC BMI ABV UP PARAM F/U: HCPCS | Performed by: STUDENT IN AN ORGANIZED HEALTH CARE EDUCATION/TRAINING PROGRAM

## 2023-09-26 PROCEDURE — G8427 DOCREV CUR MEDS BY ELIG CLIN: HCPCS | Performed by: STUDENT IN AN ORGANIZED HEALTH CARE EDUCATION/TRAINING PROGRAM

## 2023-09-26 PROCEDURE — 3017F COLORECTAL CA SCREEN DOC REV: CPT | Performed by: STUDENT IN AN ORGANIZED HEALTH CARE EDUCATION/TRAINING PROGRAM

## 2023-09-26 PROCEDURE — 3078F DIAST BP <80 MM HG: CPT | Performed by: STUDENT IN AN ORGANIZED HEALTH CARE EDUCATION/TRAINING PROGRAM

## 2023-09-26 PROCEDURE — 1036F TOBACCO NON-USER: CPT | Performed by: STUDENT IN AN ORGANIZED HEALTH CARE EDUCATION/TRAINING PROGRAM

## 2023-09-26 PROCEDURE — 3074F SYST BP LT 130 MM HG: CPT | Performed by: STUDENT IN AN ORGANIZED HEALTH CARE EDUCATION/TRAINING PROGRAM

## 2023-09-26 ASSESSMENT — ENCOUNTER SYMPTOMS
SHORTNESS OF BREATH: 0
ABDOMINAL PAIN: 0
CHEST TIGHTNESS: 0

## 2023-09-26 ASSESSMENT — PATIENT HEALTH QUESTIONNAIRE - PHQ9
4. FEELING TIRED OR HAVING LITTLE ENERGY: 0
7. TROUBLE CONCENTRATING ON THINGS, SUCH AS READING THE NEWSPAPER OR WATCHING TELEVISION: 0
3. TROUBLE FALLING OR STAYING ASLEEP: 0
SUM OF ALL RESPONSES TO PHQ9 QUESTIONS 1 & 2: 0
SUM OF ALL RESPONSES TO PHQ QUESTIONS 1-9: 0
SUM OF ALL RESPONSES TO PHQ QUESTIONS 1-9: 0
2. FEELING DOWN, DEPRESSED OR HOPELESS: 0
5. POOR APPETITE OR OVEREATING: 0
SUM OF ALL RESPONSES TO PHQ QUESTIONS 1-9: 0
1. LITTLE INTEREST OR PLEASURE IN DOING THINGS: 0
8. MOVING OR SPEAKING SO SLOWLY THAT OTHER PEOPLE COULD HAVE NOTICED. OR THE OPPOSITE, BEING SO FIGETY OR RESTLESS THAT YOU HAVE BEEN MOVING AROUND A LOT MORE THAN USUAL: 0
SUM OF ALL RESPONSES TO PHQ QUESTIONS 1-9: 0
6. FEELING BAD ABOUT YOURSELF - OR THAT YOU ARE A FAILURE OR HAVE LET YOURSELF OR YOUR FAMILY DOWN: 0
9. THOUGHTS THAT YOU WOULD BE BETTER OFF DEAD, OR OF HURTING YOURSELF: 0

## 2023-09-26 ASSESSMENT — ANXIETY QUESTIONNAIRES
1. FEELING NERVOUS, ANXIOUS, OR ON EDGE: 1
6. BECOMING EASILY ANNOYED OR IRRITABLE: 0
5. BEING SO RESTLESS THAT IT IS HARD TO SIT STILL: 0
3. WORRYING TOO MUCH ABOUT DIFFERENT THINGS: 0
GAD7 TOTAL SCORE: 2
4. TROUBLE RELAXING: 1
2. NOT BEING ABLE TO STOP OR CONTROL WORRYING: 0
7. FEELING AFRAID AS IF SOMETHING AWFUL MIGHT HAPPEN: 0

## 2023-09-26 NOTE — PROGRESS NOTES
Ruben Bautista [facility]  YOB: 1965    Date of Service:  9/26/2023    Chief Complaint:   Wojciech Mondragon is a 65 y.o. female who presents to establish care      Allergies:  No Known Allergies    Family Hx:  Family History   Problem Relation Age of Onset    Breast Cancer Sister 62    Breast Cancer Niece 22    Breast Cancer Paternal Aunt 72    Hypertension Mother     Diabetes Father     Hypertension Father    Father: CAD    Surgical HX:  Past Surgical History:   Procedure Laterality Date    CYST REMOVAL  2003    from head not residual problems, MULTIPLE    TOTAL HIP ARTHROPLASTY Right 3/6/2023    RIGHT PRIMARY TOTAL HIP ARTHROPLASTY              MCK[employer]IE [employer] NEPHEW  performed by Kenneth Hart MD at [facility] [address] Hx:  Alcohol- 1-2 times yearly  Tobacco- none  Recreational- none  Patient's last menstrual period was 01/23/2011. menstrual cycle: postmenopausal   Sexual activity: single partner, contraception - none      Medications:  Current Outpatient Medications   Medication Sig Dispense Refill    atorvastatin (LIPITOR) 10 MG tablet Take 1 tablet by mouth daily 90 tablet 1    methocarbamol (ROBAXIN) 500 MG tablet TAKE 1-2 TABLETS BY MOUTH 3 TIMES DAILY AS NEEDED (BACK PAIN) 60 tablet 0    FLUoxetine (PROZAC) 20 MG capsule TAKE 3 CAPSULES BY MOUTH EVERY  capsule 0    lisinopril (PRINIVIL;ZESTRIL) 20 MG tablet TAKE 1 TABLET BY MOUTH EVERY DAY 90 tablet 1    acetaminophen (TYLENOL) 500 MG tablet Take 2 tablets by mouth every 6 hours as needed for Pain      diclofenac sodium (VOLTAREN) 1 % GEL APPLY 2-4 GRAMS TOPICALLY FOUR TIMES A DAY AS NEEDED FOR PAIN 100 g 1    Cholecalciferol (VITAMIN D) 50 MCG (2000 UT) CAPS capsule Take 2,000 Units by mouth daily      minocycline (MINOCIN;DYNACIN) 100 MG capsule 2 capsules daily       No current facility-administered medications for this visit.          PMHx:  Patient Active Problem List   Diagnosis    HTN (hypertension)

## 2023-09-26 NOTE — PROGRESS NOTES
[facility]  YOB: 1965    Date of Service:  9/26/2023    Chief Complaint:   Sandy Rojas is a 65 y.o. female who presents to establish care      Allergies:  No Known Allergies    Family Hx:  Family History   Problem Relation Age of Onset    Breast Cancer Sister 62    Breast Cancer Niece 22    Breast Cancer Paternal Aunt 72    Hypertension Mother     Diabetes Father     Hypertension Father        Surgical HX:  Past Surgical History:   Procedure Laterality Date    CYST REMOVAL  2003    from head not residual problems, MULTIPLE    TOTAL HIP ARTHROPLASTY Right 3/6/2023    RIGHT PRIMARY TOTAL HIP ARTHROPLASTY              MCK[employer]IE [employer] NEPHEW  performed by Chiquita Singh MD at [facility] [address] Hx:  Alcohol- rarely  Tobacco- never  Recreational-  never  Patient's last menstrual period was 01/23/2011. menstrual cycle: post menopausal   Sexual activity: has sex with male, with ,         Medications:  Current Outpatient Medications   Medication Sig Dispense Refill    atorvastatin (LIPITOR) 10 MG tablet Take 1 tablet by mouth daily 90 tablet 1    methocarbamol (ROBAXIN) 500 MG tablet TAKE 1-2 TABLETS BY MOUTH 3 TIMES DAILY AS NEEDED (BACK PAIN) 60 tablet 0    FLUoxetine (PROZAC) 20 MG capsule TAKE 3 CAPSULES BY MOUTH EVERY  capsule 0    lisinopril (PRINIVIL;ZESTRIL) 20 MG tablet TAKE 1 TABLET BY MOUTH EVERY DAY 90 tablet 1    acetaminophen (TYLENOL) 500 MG tablet Take 2 tablets by mouth every 6 hours as needed for Pain      diclofenac sodium (VOLTAREN) 1 % GEL APPLY 2-4 GRAMS TOPICALLY FOUR TIMES A DAY AS NEEDED FOR PAIN 100 g 1    Cholecalciferol (VITAMIN D) 50 MCG (2000 UT) CAPS capsule Take 2,000 Units by mouth daily      minocycline (MINOCIN;DYNACIN) 100 MG capsule 2 capsules daily       No current facility-administered medications for this visit.          PMHx:  Patient Active Problem List   Diagnosis    HTN (hypertension)    Depression    Mixed

## 2023-09-28 ASSESSMENT — ENCOUNTER SYMPTOMS
NAUSEA: 0
COUGH: 0
CONSTIPATION: 0
ABDOMINAL PAIN: 0
SHORTNESS OF BREATH: 0
CHEST TIGHTNESS: 0
DIARRHEA: 0
VOMITING: 0
SORE THROAT: 0

## 2023-10-16 DIAGNOSIS — G89.29 CHRONIC RIGHT-SIDED LOW BACK PAIN, UNSPECIFIED WHETHER SCIATICA PRESENT: ICD-10-CM

## 2023-10-16 DIAGNOSIS — M54.50 CHRONIC RIGHT-SIDED LOW BACK PAIN, UNSPECIFIED WHETHER SCIATICA PRESENT: ICD-10-CM

## 2023-10-16 DIAGNOSIS — M54.50 ACUTE LEFT-SIDED LOW BACK PAIN, UNSPECIFIED WHETHER SCIATICA PRESENT: ICD-10-CM

## 2023-10-16 NOTE — TELEPHONE ENCOUNTER
.Refill Request     CONFIRM preferred pharmacy with the patient. If Mail Order Rx - Pend for 90 day refill. Last Seen: Last Seen Department: 9/26/2023  Last Seen by PCP: 9/26/2023    Last Written: 9-7-23 61 with 0     If no future appointment scheduled:  Review the last OV with PCP and review information for follow-up visit,  Route STAFF MESSAGE with patient name to the [facility] for scheduling with the following information:            -  Timing of next visit           -  Visit type ie Physical, OV, etc           -  Diagnoses/Reason ie. COPD, HTN - Do not use MEDICATION, Follow-up or CHECK UP - Give reason for visit      Next Appointment:   Future Appointments   Date Time Provider [address] [place] [address]   10/27/2023  8:45 AM Evi Ziegler MD [facility]   3/26/2024  2:30 PM Alicia Muniz MD [facility]       Message sent to [address] [place] [address] to schedule appt with patient?   YES      Requested Prescriptions     Pending Prescriptions Disp Refills    methocarbamol (ROBAXIN) 500 MG tablet [Pharmacy Med Name: METHOCARBAMOL 500 MG TABLET] 60 tablet 0     Sig: TAKE 1-2 TABLETS BY MOUTH 3 TIMES DAILY AS NEEDED (BACK PAIN)

## 2023-10-17 RX ORDER — METHOCARBAMOL 500 MG/1
500-1000 TABLET, FILM COATED ORAL 3 TIMES DAILY PRN
Qty: 60 TABLET | Refills: 0 | Status: SHIPPED | OUTPATIENT
Start: 2023-10-17

## 2023-10-24 ENCOUNTER — ANESTHESIA EVENT (OUTPATIENT)
Dept: ENDOSCOPY | Age: 58
End: 2023-10-24
Payer: COMMERCIAL

## 2023-10-25 NOTE — PROGRESS NOTES
..1.  Do not eat or drink anything after 12 midnight prior to surgery. This includes no water, chewing gum or mints, except for bowel prep complete per MD.  2.  Take the following pills with a small sip of water on the morning of surgery 11/06/2023.  3.  Aspirin, Ibuprofen, Advil, Naproxen, Vitamin E and other Anti-inflammatory products should be stopped for 5 days before surgery or as directed by your physician. 4.  Check with your doctor regarding stopping Plavix, Coumadin, Lovenox, Fragmin or other blood thinners. 5.  Do not smoke and do not drink alcoholic beverages 24 hours prior to surgery. This includes NA Beer. 6.  You may brush your teeth and gargle the morning of surgery. DO NOT SWALLOW WATER.  7.  You MUST make arrangements for a responsible adult to take you home after your surgery. You will not be allowed to leave alone or drive yourself home. It is strongly suggested someone stay with you the first 24 hours. Your surgery will be cancelled if you do not have a ride home. 8.  A parent/legal guardian must accompany a child scheduled for surgery and plan to stay at the hospital until the child is discharged. Please do not bring other children with you. 9.  Please wear simple, loose fitting clothing to the hospital.  Pearl Tran not bring valuables ( money, credit cards, checkbooks, etc.)  Do not wear any makeup (including no eye makeup) or nail polish on your fingers or toes. 10.  Do not wear any jewelry or piercing on the day of surgery. All body piercing jewelry must be removed. 11.  If you have dentures, they will be removed before going to the OR; we will provide you a container. If you wear contact lenses or glasses, they will be removed; please bring a case for them.   15.  Notify your Surgeon if you develop any illness between now and surgery time; cough, cold, fever, sore throat, nausea, vomiting, etc.  Please notify your surgeon if you experience dizziness, shortness of breath or blurred

## 2023-10-27 ENCOUNTER — OFFICE VISIT (OUTPATIENT)
Dept: ORTHOPEDIC SURGERY | Age: 58
End: 2023-10-27

## 2023-10-27 DIAGNOSIS — Z96.641 STATUS POST TOTAL REPLACEMENT OF RIGHT HIP: Primary | ICD-10-CM

## 2023-10-27 DIAGNOSIS — M54.50 LUMBAR SPINE PAIN: ICD-10-CM

## 2023-10-27 NOTE — PROGRESS NOTES
ORTHOPAEDIC SURGERY FOLLOWUP VISIT     CHIEF COMPLAINT:  Follow-up right hip     DATE OF INJURY: N/A  DIAGNOSIS: Right hip osteoarthritis  DATE OF SURGERY: 3/6/2023, right primary total hip arthroplasty     HISTORY OF PRESENT ILLNESS:  61-year-old female presents for evaluation 7 months postop from right primary total hip arthroplasty. Overall she is doing very well. She has had tremendous improvement in quality of life since her hip replacement surgery. She denies significant pain. She has noticed some achiness in the hip with the change in weather. She has remained active. She has found that she is able to do many things that she could not do preoperatively. She has had some pain in her low back which she has been managing with Robaxin on a daily basis. PHYSICAL EXAM:  General: Well-appearing female of stated age. No acute distress. Right lower extremity: Incision site is maturely healed. There is no open areas. Minor nodularity to the incisional site. No surrounding erythema. No active drainage. Minimal bruising. Minimal tenderness to palpation. Overall limb length and rotational alignment is appropriate. Patient was observed to easily standing from seated position without assist devices. She was able to stand unsupported. There are no cuts, open wounds, or abrasions. Sensation is intact to light touch in deep peroneal, superficial peroneal, tibial, sural, and saphenous nerve distributions. Motor function is intact to EHL, FHL, tibialis anterior, and gastroc. There is brisk capillary refill to the toes and a strong palpable dorsalis pedis pulse. Compartments are soft and compressible. There is no calf tenderness and a negative Homans' sign. RADIOGRAPHIC EXAM:  AP pelvis as well as AP and frog lateral views of the right hip demonstrate total hip arthroplasty components in expected position. There is no evidence of tino-implant fracture. No lucency or loosening.  No features of

## 2023-11-06 ENCOUNTER — HOSPITAL ENCOUNTER (OUTPATIENT)
Age: 58
Setting detail: OUTPATIENT SURGERY
Discharge: HOME | End: 2023-11-06
Attending: INTERNAL MEDICINE | Admitting: INTERNAL MEDICINE
Payer: COMMERCIAL

## 2023-11-06 ENCOUNTER — ANESTHESIA (OUTPATIENT)
Dept: ENDOSCOPY | Age: 58
End: 2023-11-06
Payer: COMMERCIAL

## 2023-11-06 VITALS
WEIGHT: 215 LBS | OXYGEN SATURATION: 99 % | HEART RATE: 67 BPM | HEIGHT: 62 IN | BODY MASS INDEX: 39.56 KG/M2 | TEMPERATURE: 97.6 F | RESPIRATION RATE: 15 BRPM | DIASTOLIC BLOOD PRESSURE: 64 MMHG | SYSTOLIC BLOOD PRESSURE: 114 MMHG

## 2023-11-06 LAB
ANION GAP SERPL CALCULATED.3IONS-SCNC: 14 MMOL/L (ref 3–16)
BUN SERPL-MCNC: 12 MG/DL (ref 7–20)
CALCIUM SERPL-MCNC: 9.6 MG/DL (ref 8.3–10.6)
CHLORIDE SERPL-SCNC: 105 MMOL/L (ref 99–110)
CO2 SERPL-SCNC: 24 MMOL/L (ref 21–32)
CREAT SERPL-MCNC: <0.5 MG/DL (ref 0.6–1.1)
GFR SERPLBLD CREATININE-BSD FMLA CKD-EPI: >60 ML/MIN/{1.73_M2}
GLUCOSE SERPL-MCNC: 96 MG/DL (ref 70–99)
POTASSIUM SERPL-SCNC: 4.4 MMOL/L (ref 3.5–5.1)
SODIUM SERPL-SCNC: 143 MMOL/L (ref 136–145)

## 2023-11-06 PROCEDURE — 7100000010 HC PHASE II RECOVERY - FIRST 15 MIN: Performed by: INTERNAL MEDICINE

## 2023-11-06 PROCEDURE — 3609027000 HC COLONOSCOPY: Performed by: INTERNAL MEDICINE

## 2023-11-06 PROCEDURE — 3700000001 HC ADD 15 MINUTES (ANESTHESIA): Performed by: INTERNAL MEDICINE

## 2023-11-06 PROCEDURE — 36415 COLL VENOUS BLD VENIPUNCTURE: CPT

## 2023-11-06 PROCEDURE — 80048 BASIC METABOLIC PNL TOTAL CA: CPT

## 2023-11-06 PROCEDURE — 6360000002 HC RX W HCPCS: Performed by: NURSE ANESTHETIST, CERTIFIED REGISTERED

## 2023-11-06 PROCEDURE — 2500000003 HC RX 250 WO HCPCS: Performed by: NURSE ANESTHETIST, CERTIFIED REGISTERED

## 2023-11-06 PROCEDURE — 2580000003 HC RX 258: Performed by: NURSE ANESTHETIST, CERTIFIED REGISTERED

## 2023-11-06 PROCEDURE — 7100000011 HC PHASE II RECOVERY - ADDTL 15 MIN: Performed by: INTERNAL MEDICINE

## 2023-11-06 PROCEDURE — 2709999900 HC NON-CHARGEABLE SUPPLY: Performed by: INTERNAL MEDICINE

## 2023-11-06 PROCEDURE — 2580000003 HC RX 258: Performed by: ANESTHESIOLOGY

## 2023-11-06 PROCEDURE — 2500000003 HC RX 250 WO HCPCS: Performed by: ANESTHESIOLOGY

## 2023-11-06 PROCEDURE — 3700000000 HC ANESTHESIA ATTENDED CARE: Performed by: INTERNAL MEDICINE

## 2023-11-06 RX ORDER — LIDOCAINE HYDROCHLORIDE 20 MG/ML
INJECTION, SOLUTION INFILTRATION; PERINEURAL PRN
Status: DISCONTINUED | OUTPATIENT
Start: 2023-11-06 | End: 2023-11-06 | Stop reason: SDUPTHER

## 2023-11-06 RX ORDER — ONDANSETRON 2 MG/ML
4 INJECTION INTRAMUSCULAR; INTRAVENOUS
Status: DISCONTINUED | OUTPATIENT
Start: 2023-11-06 | End: 2023-11-06 | Stop reason: HOSPADM

## 2023-11-06 RX ORDER — SODIUM CHLORIDE, SODIUM LACTATE, POTASSIUM CHLORIDE, CALCIUM CHLORIDE 600; 310; 30; 20 MG/100ML; MG/100ML; MG/100ML; MG/100ML
INJECTION, SOLUTION INTRAVENOUS CONTINUOUS PRN
Status: DISCONTINUED | OUTPATIENT
Start: 2023-11-06 | End: 2023-11-06 | Stop reason: SDUPTHER

## 2023-11-06 RX ORDER — OXYCODONE HYDROCHLORIDE 5 MG/1
10 TABLET ORAL PRN
Status: DISCONTINUED | OUTPATIENT
Start: 2023-11-06 | End: 2023-11-06 | Stop reason: HOSPADM

## 2023-11-06 RX ORDER — PROPOFOL 10 MG/ML
INJECTION, EMULSION INTRAVENOUS PRN
Status: DISCONTINUED | OUTPATIENT
Start: 2023-11-06 | End: 2023-11-06 | Stop reason: SDUPTHER

## 2023-11-06 RX ORDER — SODIUM CHLORIDE, SODIUM LACTATE, POTASSIUM CHLORIDE, CALCIUM CHLORIDE 600; 310; 30; 20 MG/100ML; MG/100ML; MG/100ML; MG/100ML
INJECTION, SOLUTION INTRAVENOUS CONTINUOUS
Status: DISCONTINUED | OUTPATIENT
Start: 2023-11-06 | End: 2023-11-06 | Stop reason: HOSPADM

## 2023-11-06 RX ORDER — SODIUM CHLORIDE 9 MG/ML
INJECTION, SOLUTION INTRAVENOUS PRN
Status: DISCONTINUED | OUTPATIENT
Start: 2023-11-06 | End: 2023-11-06 | Stop reason: HOSPADM

## 2023-11-06 RX ORDER — FAMOTIDINE 10 MG/ML
20 INJECTION, SOLUTION INTRAVENOUS ONCE
Status: COMPLETED | OUTPATIENT
Start: 2023-11-06 | End: 2023-11-06

## 2023-11-06 RX ORDER — SODIUM CHLORIDE 0.9 % (FLUSH) 0.9 %
5-40 SYRINGE (ML) INJECTION EVERY 12 HOURS SCHEDULED
Status: DISCONTINUED | OUTPATIENT
Start: 2023-11-06 | End: 2023-11-06 | Stop reason: HOSPADM

## 2023-11-06 RX ORDER — DIPHENHYDRAMINE HYDROCHLORIDE 50 MG/ML
12.5 INJECTION INTRAMUSCULAR; INTRAVENOUS
Status: DISCONTINUED | OUTPATIENT
Start: 2023-11-06 | End: 2023-11-06 | Stop reason: HOSPADM

## 2023-11-06 RX ORDER — LABETALOL HYDROCHLORIDE 5 MG/ML
5 INJECTION, SOLUTION INTRAVENOUS EVERY 10 MIN PRN
Status: DISCONTINUED | OUTPATIENT
Start: 2023-11-06 | End: 2023-11-06 | Stop reason: HOSPADM

## 2023-11-06 RX ORDER — SODIUM CHLORIDE 0.9 % (FLUSH) 0.9 %
5-40 SYRINGE (ML) INJECTION PRN
Status: DISCONTINUED | OUTPATIENT
Start: 2023-11-06 | End: 2023-11-06 | Stop reason: HOSPADM

## 2023-11-06 RX ORDER — OXYCODONE HYDROCHLORIDE 5 MG/1
5 TABLET ORAL PRN
Status: DISCONTINUED | OUTPATIENT
Start: 2023-11-06 | End: 2023-11-06 | Stop reason: HOSPADM

## 2023-11-06 RX ORDER — MEPERIDINE HYDROCHLORIDE 25 MG/ML
12.5 INJECTION INTRAMUSCULAR; INTRAVENOUS; SUBCUTANEOUS EVERY 5 MIN PRN
Status: DISCONTINUED | OUTPATIENT
Start: 2023-11-06 | End: 2023-11-06 | Stop reason: HOSPADM

## 2023-11-06 RX ADMIN — LIDOCAINE HYDROCHLORIDE 60 MG: 20 INJECTION, SOLUTION INFILTRATION; PERINEURAL at 08:53

## 2023-11-06 RX ADMIN — PROPOFOL 200 MG: 10 INJECTION, EMULSION INTRAVENOUS at 09:04

## 2023-11-06 RX ADMIN — SODIUM CHLORIDE, POTASSIUM CHLORIDE, SODIUM LACTATE AND CALCIUM CHLORIDE: 600; 310; 30; 20 INJECTION, SOLUTION INTRAVENOUS at 08:47

## 2023-11-06 RX ADMIN — SODIUM CHLORIDE, POTASSIUM CHLORIDE, SODIUM LACTATE AND CALCIUM CHLORIDE: 600; 310; 30; 20 INJECTION, SOLUTION INTRAVENOUS at 08:53

## 2023-11-06 RX ADMIN — PROPOFOL 200 MG: 10 INJECTION, EMULSION INTRAVENOUS at 08:53

## 2023-11-06 RX ADMIN — FAMOTIDINE 20 MG: 10 INJECTION, SOLUTION INTRAVENOUS at 08:17

## 2023-11-06 ASSESSMENT — PAIN SCALES - GENERAL: PAINLEVEL_OUTOF10: 0

## 2023-11-06 ASSESSMENT — PAIN - FUNCTIONAL ASSESSMENT: PAIN_FUNCTIONAL_ASSESSMENT: NONE - DENIES PAIN

## 2023-11-06 NOTE — PROCEDURES
Colonoscopy Procedure  Note          Patient: Alejandro Rodriguez  : 1965  CSN:     Procedure: Colonoscopy     Date:  2023    Surgeon:  Gini Echeverria MD, MD    Referring Provider:  Dr. Abbe Carlson    Preoperative Diagnosis:  Screening for colon cancer    Postoperative Diagnosis:  Normal colonoscopy    Anesthesia:  Propofol    EBL: <5 mL    Indications: This is a 65y.o. year old female who presents today with screening for colon cancer. Procedure: An informed consent was obtained from the patient after explanation of indications, benefits, possible risks and complications of the procedure. The patient was then taken to the endoscopy suite, placed in the left lateral decubitus position, and the above IV anesthesia was administered. With the patient in left lateral decubitus position the endoscope was inserted through the anorectal area into the rectum. The scope was then advanced through the length of the colon to the ileum. The ileocecal valve was visualized and cannulated. The quality of preparation was good. Water was insufflated through the biopsy channel to clean out the colon and look at the underlying mucosa. The scope was carefully withdrawn and found to be normal.    Digital rectal examination was performed, no abnormalities noted. The scope was advanced all the way to the cecum, the mucosa appeared normal.  Appendix was identified. The terminal ileum was briefly intubated, the mucosa appeared normal.  The mucosa in the ascending, transverse, descending, sigmoid and rectum appeared normal.  On retroflexion no abnormalities were noted. Sigmoid diverticulosis was seen. The scope was straightened, the colon was decompressed and the scope was withdrawn from the patient. The patient tolerated the procedure well and was taken to the PACU in good condition. Impression:  Normal colonoscopy    Recommendations:  Repeat colonoscopy in 10 years.     Gini Echeverria MD, MD

## 2023-11-06 NOTE — H&P
Gastroenterology Note             Pre-operative History and Physical    Patient: Matt Reynoso  : 1965  CSN:     History Obtained From:  patient and/or guardian. HISTORY OF PRESENT ILLNESS:    The patient is a 65 y.o. female  here for colonoscopy. Past Medical History:    Past Medical History:   Diagnosis Date    Anemia     Arthritis     Depression     Hyperlipidemia     Hypertension     Infertility, female     Overactive bladder     Pre-eclampsia     Stress incontinence      Past Surgical History:    Past Surgical History:   Procedure Laterality Date    CYST REMOVAL      from head not residual problems, MULTIPLE    TOTAL HIP ARTHROPLASTY Right 3/6/2023    RIGHT PRIMARY TOTAL HIP ARTHROPLASTY              [employer]IE [employer] [facility]W  performed by Danial Corrales MD at [facility]       Medications Prior to Admission:   No current facility-administered medications on file prior to encounter. Current Outpatient Medications on File Prior to Encounter   Medication Sig Dispense Refill    atorvastatin (LIPITOR) 10 MG tablet Take 1 tablet by mouth daily 90 tablet 1    FLUoxetine (PROZAC) 20 MG capsule TAKE 3 CAPSULES BY MOUTH EVERY  capsule 0    lisinopril (PRINIVIL;ZESTRIL) 20 MG tablet TAKE 1 TABLET BY MOUTH EVERY DAY 90 tablet 1    acetaminophen (TYLENOL) 500 MG tablet Take 2 tablets by mouth every 6 hours as needed for Pain      diclofenac sodium (VOLTAREN) 1 % GEL APPLY 2-4 GRAMS TOPICALLY FOUR TIMES A DAY AS NEEDED FOR PAIN 100 g 1    Cholecalciferol (VITAMIN D) 50 MCG (2000 UT) CAPS capsule Take 2,000 Units by mouth daily      minocycline (MINOCIN;DYNACIN) 100 MG capsule 2 capsules daily          Allergies:  Patient has no known allergies.       Social History:   Social History     Tobacco Use    Smoking status: Never    Smokeless tobacco: Never   Substance Use Topics    Alcohol use: No     Family History:   Family History   Problem Relation Age of Onset

## 2023-11-06 NOTE — DISCHARGE INSTRUCTIONS
PATIENT INSTRUCTIONS  POST-SEDATION    Luz Marina Fitzpatrick          IMMEDIATELY FOLLOWING PROCEDURE:    Do not drive or operate machinery for the first twenty four hours after surgery. Do not make any important decisions for twenty four hours after surgery or while taking narcotic pain medications or sedatives. You should NOT BE LEFT UNATTENDED OR ALONE. A responsible adult should be with you for the rest of the day of your procedure and also during the night for your protection and safety. You may experience some light headedness. Rest at home with activity as tolerated. You may not need to go to bed, but it is important to rest for the next 24 hours. You should not engage in athletic sports such as basketball, volleyball, jogging, skating, or activities requiring refined motor skills for 24 hours. If you develop intractable nausea and vomiting or a severe headache please notify your doctor immediately. You are not expected to have any fever, but if you feel warm, take your temperature. If you have a fever 101 degrees or higher, call your doctor. If you have had an Endoscopy:   *Eat lightly for your first meal and gradually resume your normal / prescribed diet. DO NOT eat or drink until your gag reflex returns. *If you have had a colonoscopy, do not expect a normal bowel movement for approximately three days due to the cleansing of the large intestine prior to colonoscopy. ONCE YOU ARE HOME, IF YOU SHOULD HAVE:  Difficulty in breathing, persistent nausea or vomiting, bleeding you feel is excessive, or pain that is unusual, increased abdominal bloating, or any swelling, fever / chills, call your physician. If you cannot contact your physician, but feel that your signs and symptoms need a physician's attention, go to the Emergency Department. FOLLOW-UP:    Please follow up with Dr. Shawanda Leahy as scheduled or needed. Call Dr. Carolina Owens if there are any GI concerns. [phone].     Repeat

## 2023-11-14 DIAGNOSIS — M54.50 ACUTE LEFT-SIDED LOW BACK PAIN, UNSPECIFIED WHETHER SCIATICA PRESENT: ICD-10-CM

## 2023-11-14 DIAGNOSIS — M54.50 CHRONIC RIGHT-SIDED LOW BACK PAIN, UNSPECIFIED WHETHER SCIATICA PRESENT: ICD-10-CM

## 2023-11-14 DIAGNOSIS — G89.29 CHRONIC RIGHT-SIDED LOW BACK PAIN, UNSPECIFIED WHETHER SCIATICA PRESENT: ICD-10-CM

## 2023-11-14 RX ORDER — METHOCARBAMOL 500 MG/1
500-1000 TABLET, FILM COATED ORAL 3 TIMES DAILY PRN
Qty: 60 TABLET | Refills: 0 | Status: SHIPPED | OUTPATIENT
Start: 2023-11-14

## 2023-11-14 NOTE — TELEPHONE ENCOUNTER
Refill Request       Last Seen: Last Seen Department: 9/26/2023  Last Seen by PCP: 9/26/2023    Last Written: 10/17/2023 qty 60 w/ 0    Next Appointment:   Future Appointments   Date Time Provider [facility] [address]   11/20/2023 10:30 AM Emilia Real PA-C [facility]   3/26/2024  2:30 PM Abraham Shannon MD [facility]       Future appointment scheduled      Requested Prescriptions     Pending Prescriptions Disp Refills    methocarbamol (ROBAXIN) 500 MG tablet [Pharmacy Med Name: METHOCARBAMOL 500 MG TABLET] 60 tablet 0     Sig: TAKE 1-2 TABLETS BY MOUTH 3 TIMES DAILY AS NEEDED (BACK PAIN)

## 2023-11-17 SDOH — HEALTH STABILITY: PHYSICAL HEALTH: ON AVERAGE, HOW MANY MINUTES DO YOU ENGAGE IN EXERCISE AT THIS LEVEL?: 30 MIN

## 2023-11-17 SDOH — HEALTH STABILITY: PHYSICAL HEALTH: ON AVERAGE, HOW MANY DAYS PER WEEK DO YOU ENGAGE IN MODERATE TO STRENUOUS EXERCISE (LIKE A BRISK WALK)?: 2 DAYS

## 2023-11-20 ENCOUNTER — OFFICE VISIT (OUTPATIENT)
Dept: ORTHOPEDIC SURGERY | Age: 58
End: 2023-11-20
Payer: COMMERCIAL

## 2023-11-20 VITALS — HEIGHT: 62 IN | WEIGHT: 215 LBS | BODY MASS INDEX: 39.56 KG/M2

## 2023-11-20 DIAGNOSIS — M54.50 LUMBAR PAIN: Primary | ICD-10-CM

## 2023-11-20 DIAGNOSIS — M51.36 DDD (DEGENERATIVE DISC DISEASE), LUMBAR: ICD-10-CM

## 2023-11-20 DIAGNOSIS — M41.50 DEGENERATIVE SCOLIOSIS: ICD-10-CM

## 2023-11-20 PROCEDURE — G8417 CALC BMI ABV UP PARAM F/U: HCPCS | Performed by: PHYSICIAN ASSISTANT

## 2023-11-20 PROCEDURE — G8427 DOCREV CUR MEDS BY ELIG CLIN: HCPCS | Performed by: PHYSICIAN ASSISTANT

## 2023-11-20 PROCEDURE — G8484 FLU IMMUNIZE NO ADMIN: HCPCS | Performed by: PHYSICIAN ASSISTANT

## 2023-11-20 PROCEDURE — 99244 OFF/OP CNSLTJ NEW/EST MOD 40: CPT | Performed by: PHYSICIAN ASSISTANT

## 2023-11-20 NOTE — PROGRESS NOTES
weakness  MUSCULOSKELETAL: Denies joint swelling or redness  PSYCHOLOGICAL: Patient has a history of depression  SKIN: Denies skin changes, delayed healing, rash, itching   HEMATOLOGIC: Denies easy bleeding or bruising  ENDOCRINE: Denies excessive thirst, urination, heat/cold  RESPIRATORY: Denies current dyspnea, cough  GI: Denies nausea, vomiting, diarrhea   : Denies bowel or bladder issues      PHYSICAL EXAM:    Vitals: Height 1.575 m (5' 2.01\"), weight 97.5 kg (215 lb), last menstrual period 11/11/2011, not currently breastfeeding. GENERAL EXAM:  General Apparence: Patient is adequately groomed with no evidence of malnutrition. Orientation: The patient is oriented to time, place and person. Mood & Affect:The patient's mood and affect are appropriate. Vascular: Examination reveals no swelling tenderness in upper or lower extremities. Good capillary refill. Lymphatic: The lymphatic examination bilaterally reveals all areas to be without enlargement or induration  Sensation: Sensation is intact without deficit  Coordination/Balance: Good coordination. LUMBAR/SACRAL EXAMINATION:  Inspection: Local inspection shows no step-off or bruising. Lumbar alignment is normal.  Sagittal and Coronal balance is neutral.      Palpation:   Diffuse tenderness at the left low back. No tenderness bilaterally at the paraspinal or trochanters. There is no step-off or paraspinal spasm. Range of Motion: Lumbar flexion, extension and rotation are mildly limited due to pain. Strength:   Strength testing is 5/5 in all muscle groups tested. Special Tests:   Straight leg raise and crossed SLR negative. Leg length and pelvis level. Skin: There are no rashes, ulcerations or lesions. Reflexes: Reflexes are symmetrically 2+ at the patellar and ankle tendons. Clonus absent bilaterally at the feet.   Gait & station: normal, patient ambulates without assistance    Additional Examinations:   RIGHT LOWER EXTREMITY:

## 2023-11-27 ENCOUNTER — TELEPHONE (OUTPATIENT)
Dept: ORTHOPEDIC SURGERY | Age: 58
End: 2023-11-27

## 2023-11-27 NOTE — TELEPHONE ENCOUNTER
General Question     Subject: MRI APPROVAL   Patient and /or Facility Request: Luther Killian  Contact Number: [phone]    PT CALLING IN REGARDS TO MRI ORDER FOR HER LUMBAR. PT WANTS NOW IF IT HAS BEEN APPROVED . DUE TO [employer] NOT RECEIVING ORDER. PLEASE WOLF BACK PT AT THE ABOVE NUMBER LISTED.

## 2023-12-01 ENCOUNTER — HOSPITAL ENCOUNTER (OUTPATIENT)
Age: 58
Discharge: HOME | End: 2023-12-01
Payer: COMMERCIAL

## 2023-12-01 ENCOUNTER — OFFICE VISIT (OUTPATIENT)
Dept: PRIMARY CARE CLINIC | Age: 58
End: 2023-12-01

## 2023-12-01 VITALS
TEMPERATURE: 97.8 F | OXYGEN SATURATION: 98 % | HEART RATE: 64 BPM | DIASTOLIC BLOOD PRESSURE: 66 MMHG | BODY MASS INDEX: 40.96 KG/M2 | SYSTOLIC BLOOD PRESSURE: 102 MMHG | WEIGHT: 224 LBS

## 2023-12-01 DIAGNOSIS — Z01.810 PREOP CARDIOVASCULAR EXAM: Primary | ICD-10-CM

## 2023-12-01 DIAGNOSIS — Z01.810 PREOP CARDIOVASCULAR EXAM: ICD-10-CM

## 2023-12-01 DIAGNOSIS — S02.2XXB OPEN FRACTURE OF NASAL BONE, INITIAL ENCOUNTER: ICD-10-CM

## 2023-12-01 DIAGNOSIS — Z96.641 STATUS POST TOTAL REPLACEMENT OF RIGHT HIP: ICD-10-CM

## 2023-12-01 LAB
ANION GAP SERPL CALCULATED.3IONS-SCNC: 10 MMOL/L (ref 3–16)
BUN SERPL-MCNC: 20 MG/DL (ref 7–20)
CALCIUM SERPL-MCNC: 9.5 MG/DL (ref 8.3–10.6)
CHLORIDE SERPL-SCNC: 104 MMOL/L (ref 99–110)
CO2 SERPL-SCNC: 25 MMOL/L (ref 21–32)
CREAT SERPL-MCNC: 0.6 MG/DL (ref 0.6–1.1)
GFR SERPLBLD CREATININE-BSD FMLA CKD-EPI: >60 ML/MIN/{1.73_M2}
GLUCOSE SERPL-MCNC: 75 MG/DL (ref 70–99)
POTASSIUM SERPL-SCNC: 5 MMOL/L (ref 3.5–5.1)
SODIUM SERPL-SCNC: 139 MMOL/L (ref 136–145)

## 2023-12-01 PROCEDURE — 80048 BASIC METABOLIC PNL TOTAL CA: CPT

## 2023-12-01 PROCEDURE — 36415 COLL VENOUS BLD VENIPUNCTURE: CPT

## 2023-12-01 RX ORDER — VIT C/B6/B5/MAGNESIUM/HERB 173 50-5-6-5MG
1000 CAPSULE ORAL DAILY
COMMUNITY
Start: 2023-11-26

## 2023-12-01 SDOH — ECONOMIC STABILITY: INCOME INSECURITY: HOW HARD IS IT FOR YOU TO PAY FOR THE VERY BASICS LIKE FOOD, HOUSING, MEDICAL CARE, AND HEATING?: NOT HARD AT ALL

## 2023-12-01 SDOH — ECONOMIC STABILITY: FOOD INSECURITY: WITHIN THE PAST 12 MONTHS, YOU WORRIED THAT YOUR FOOD WOULD RUN OUT BEFORE YOU GOT MONEY TO BUY MORE.: NEVER TRUE

## 2023-12-01 SDOH — ECONOMIC STABILITY: FOOD INSECURITY: WITHIN THE PAST 12 MONTHS, THE FOOD YOU BOUGHT JUST DIDN'T LAST AND YOU DIDN'T HAVE MONEY TO GET MORE.: NEVER TRUE

## 2023-12-01 ASSESSMENT — ANXIETY QUESTIONNAIRES
3. WORRYING TOO MUCH ABOUT DIFFERENT THINGS: 1
7. FEELING AFRAID AS IF SOMETHING AWFUL MIGHT HAPPEN: 0
6. BECOMING EASILY ANNOYED OR IRRITABLE: 0
IF YOU CHECKED OFF ANY PROBLEMS ON THIS QUESTIONNAIRE, HOW DIFFICULT HAVE THESE PROBLEMS MADE IT FOR YOU TO DO YOUR WORK, TAKE CARE OF THINGS AT HOME, OR GET ALONG WITH OTHER PEOPLE: SOMEWHAT DIFFICULT
GAD7 TOTAL SCORE: 3
4. TROUBLE RELAXING: 0
5. BEING SO RESTLESS THAT IT IS HARD TO SIT STILL: 0
2. NOT BEING ABLE TO STOP OR CONTROL WORRYING: 1
1. FEELING NERVOUS, ANXIOUS, OR ON EDGE: 1

## 2023-12-01 ASSESSMENT — ENCOUNTER SYMPTOMS
ABDOMINAL PAIN: 0
SHORTNESS OF BREATH: 0
CONSTIPATION: 0
CHOKING: 0
COLOR CHANGE: 1
DIARRHEA: 0
SORE THROAT: 0
VOMITING: 0
TROUBLE SWALLOWING: 0
NAUSEA: 0
BLOOD IN STOOL: 0
COUGH: 0

## 2023-12-01 ASSESSMENT — PATIENT HEALTH QUESTIONNAIRE - PHQ9
9. THOUGHTS THAT YOU WOULD BE BETTER OFF DEAD, OR OF HURTING YOURSELF: 0
10. IF YOU CHECKED OFF ANY PROBLEMS, HOW DIFFICULT HAVE THESE PROBLEMS MADE IT FOR YOU TO DO YOUR WORK, TAKE CARE OF THINGS AT HOME, OR GET ALONG WITH OTHER PEOPLE: 0
SUM OF ALL RESPONSES TO PHQ QUESTIONS 1-9: 4
1. LITTLE INTEREST OR PLEASURE IN DOING THINGS: 1
8. MOVING OR SPEAKING SO SLOWLY THAT OTHER PEOPLE COULD HAVE NOTICED. OR THE OPPOSITE, BEING SO FIGETY OR RESTLESS THAT YOU HAVE BEEN MOVING AROUND A LOT MORE THAN USUAL: 0
5. POOR APPETITE OR OVEREATING: 0
6. FEELING BAD ABOUT YOURSELF - OR THAT YOU ARE A FAILURE OR HAVE LET YOURSELF OR YOUR FAMILY DOWN: 1
4. FEELING TIRED OR HAVING LITTLE ENERGY: 1
SUM OF ALL RESPONSES TO PHQ QUESTIONS 1-9: 4
SUM OF ALL RESPONSES TO PHQ QUESTIONS 1-9: 4
SUM OF ALL RESPONSES TO PHQ9 QUESTIONS 1 & 2: 2
2. FEELING DOWN, DEPRESSED OR HOPELESS: 1
3. TROUBLE FALLING OR STAYING ASLEEP: 0
SUM OF ALL RESPONSES TO PHQ QUESTIONS 1-9: 4
7. TROUBLE CONCENTRATING ON THINGS, SUCH AS READING THE NEWSPAPER OR WATCHING TELEVISION: 0

## 2023-12-01 ASSESSMENT — COLUMBIA-SUICIDE SEVERITY RATING SCALE - C-SSRS
3. HAVE YOU BEEN THINKING ABOUT HOW YOU MIGHT KILL YOURSELF?: NO
4. HAVE YOU HAD THESE THOUGHTS AND HAD SOME INTENTION OF ACTING ON THEM?: NO
7. DID THIS OCCUR IN THE LAST THREE MONTHS: NO
5. HAVE YOU STARTED TO WORK OUT OR WORKED OUT THE DETAILS OF HOW TO KILL YOURSELF? DO YOU INTEND TO CARRY OUT THIS PLAN?: NO

## 2023-12-01 NOTE — PROGRESS NOTES
Subjective:     Jason Jimenez is a 63 y.o.  female  who presents to the office today for a preoperative consultation at the request of surgeon Dr. Bernadette Lazaro and Dr Rosibel Diaz who plans on performing closed nasal reduction on December 7 with f/u planned 12/14. Planned anesthesia is IV sedation without intubation. The patient has the following known anesthesia issues:  none with anesthesia or intubation. Tolerated her prior total hip arthroplasty. Patient has a bleeding risk of : no recent abnormal bleeding. Patient does not have objection to receiving blood products if needed. - Informed by MA prior to seeing pt that there was concern of patient stumbling a couple times in the clinic when clearing the doorframe upon entering. Patient did not fall or sustain injury. Patient today reports that she feels her \"gait is a little off\" since her recent R hip replacement 3/2023 and she has occasionally stumbled when clearing door frames but usually does not fall. She feels that ever since her arthroplasty she has had total resolution of her chronic R hip pain and is still getting used to it. Had previously been used to limping and having slower gait. Denies having any dizziness or vision changes or numbness or tingling or LOC with prior episodes of tripping, or during her episode of stumbling today in clinic. Today in clinic was the first time since she had stumbled since the ER visit. Patient denies prior Hx of severe hypotension or told she had orthostatic BP. Family hx of blood disorders or problems with anesthesia (malignant hyperthermia) : Father had hypotensive reactions to general anesthesia, requiring steroids. Could not tolerate general anesthesia afterward.      Mets: 4-10 - Yes. can do 1-2 hours of house work    - RCRI - risk 3.9%  - METS Score - 4-10  - Hx of CAD, CVA, MI - None  - Malignant hyperthermia Hx - none    Patient Active Problem List   Diagnosis    HTN (hypertension)

## 2023-12-03 DIAGNOSIS — F33.0 MILD EPISODE OF RECURRENT MAJOR DEPRESSIVE DISORDER (HCC): ICD-10-CM

## 2023-12-03 NOTE — TELEPHONE ENCOUNTER
Refill Request     CONFIRM preferred pharmacy with the patient. If Mail Order Rx - Pend for 90 day refill. Last Seen: Last Seen Department: 6/22/2023  Last Seen by PCP: 9/26/2023    Last Written: 9/5/2023    If no future appointment scheduled:  Review the last OV with PCP and review information for follow-up visit,  Route STAFF MESSAGE with patient name to the [facility] for scheduling with the following information:            -  Timing of next visit           -  Visit type ie Physical, OV, etc           -  Diagnoses/Reason ie. COPD, HTN - Do not use MEDICATION, Follow-up or CHECK UP - Give reason for visit      Next Appointment:   Future Appointments   Date Time Provider [address] [place] [address]   3/5/2024  3:00 PM Dagmar Putnam MD [facility]   3/26/2024  2:30 PM Dagmar Putnam MD [facility] [facility]       Message sent to [address] [place] [address] to schedule appt with patient?   NO      Requested Prescriptions     Pending Prescriptions Disp Refills    FLUoxetine (PROZAC) 20 MG capsule [Pharmacy Med Name: FLUOXETINE HCL 20 MG CAPSULE] 270 capsule 0     Sig: TAKE 3 CAPSULES BY MOUTH EVERY DAY

## 2023-12-04 RX ORDER — FLUOXETINE HYDROCHLORIDE 20 MG/1
CAPSULE ORAL
Qty: 270 CAPSULE | Refills: 0 | Status: SHIPPED | OUTPATIENT
Start: 2023-12-04

## 2023-12-14 DIAGNOSIS — G89.29 CHRONIC RIGHT-SIDED LOW BACK PAIN, UNSPECIFIED WHETHER SCIATICA PRESENT: ICD-10-CM

## 2023-12-14 DIAGNOSIS — M54.50 CHRONIC RIGHT-SIDED LOW BACK PAIN, UNSPECIFIED WHETHER SCIATICA PRESENT: ICD-10-CM

## 2023-12-14 DIAGNOSIS — M54.50 ACUTE LEFT-SIDED LOW BACK PAIN, UNSPECIFIED WHETHER SCIATICA PRESENT: ICD-10-CM

## 2023-12-14 RX ORDER — METHOCARBAMOL 500 MG/1
500-1000 TABLET, FILM COATED ORAL 3 TIMES DAILY PRN
Qty: 60 TABLET | Refills: 0 | Status: SHIPPED | OUTPATIENT
Start: 2023-12-14

## 2023-12-14 NOTE — TELEPHONE ENCOUNTER
Refill Request   Return in about 6 months (around 3/26/2024) for Med Check.     Last Seen: Last Seen Department: 12/1/2023  Last Seen by PCP: 9/26/2023    Last Written: 11/14/23 61 with 0    Next Appointment:   Future Appointments   Date Time Provider [facility] [address]   3/5/2024  3:00 PM Abbe Carlson MD [facility] [facility]   3/26/2024  2:30 PM Abbe Carlson MD [facility]         Requested Prescriptions     Pending Prescriptions Disp Refills    methocarbamol (ROBAXIN) 500 MG tablet [Pharmacy Med Name: METHOCARBAMOL 500 MG TABLET] 60 tablet 0     Sig: TAKE 1-2 TABLETS BY MOUTH 3 TIMES DAILY AS NEEDED (BACK PAIN)

## 2024-01-08 DIAGNOSIS — M54.50 CHRONIC RIGHT-SIDED LOW BACK PAIN, UNSPECIFIED WHETHER SCIATICA PRESENT: ICD-10-CM

## 2024-01-08 DIAGNOSIS — M54.50 ACUTE LEFT-SIDED LOW BACK PAIN, UNSPECIFIED WHETHER SCIATICA PRESENT: ICD-10-CM

## 2024-01-08 DIAGNOSIS — G89.29 CHRONIC RIGHT-SIDED LOW BACK PAIN, UNSPECIFIED WHETHER SCIATICA PRESENT: ICD-10-CM

## 2024-01-08 NOTE — TELEPHONE ENCOUNTER
Refill Request       Return in about 3 months (around 3/1/2024) for f/u HTN, f/u Hyperlipidemia.         Last Seen: Last Seen Department: 12/1/2023  Last Seen by PCP: 9/26/2023    Last Written: 12/14/23 qty 60 w/ 0     Next Appointment:   Future Appointments   Date Time Provider Department Center   3/5/2024  3:00 PM Sofy Akins MD [facility]   3/26/2024  2:30 PM Sofy Akins MD [facility]       Future appointment scheduled      Requested Prescriptions     Pending Prescriptions Disp Refills    methocarbamol (ROBAXIN) 500 MG tablet [Pharmacy Med Name: METHOCARBAMOL 500 MG TABLET] 60 tablet 0     Sig: TAKE 1-2 TABLETS BY MOUTH 3 TIMES DAILY AS NEEDED (BACK PAIN)

## 2024-01-09 RX ORDER — METHOCARBAMOL 500 MG/1
500-1000 TABLET, FILM COATED ORAL 3 TIMES DAILY PRN
Qty: 60 TABLET | Refills: 0 | Status: SHIPPED | OUTPATIENT
Start: 2024-01-09

## 2024-02-22 DIAGNOSIS — M54.50 CHRONIC RIGHT-SIDED LOW BACK PAIN, UNSPECIFIED WHETHER SCIATICA PRESENT: ICD-10-CM

## 2024-02-22 DIAGNOSIS — M54.50 ACUTE LEFT-SIDED LOW BACK PAIN, UNSPECIFIED WHETHER SCIATICA PRESENT: ICD-10-CM

## 2024-02-22 DIAGNOSIS — F33.0 MILD EPISODE OF RECURRENT MAJOR DEPRESSIVE DISORDER (HCC): ICD-10-CM

## 2024-02-22 DIAGNOSIS — G89.29 CHRONIC RIGHT-SIDED LOW BACK PAIN, UNSPECIFIED WHETHER SCIATICA PRESENT: ICD-10-CM

## 2024-02-22 RX ORDER — METHOCARBAMOL 500 MG/1
500-1000 TABLET, FILM COATED ORAL 3 TIMES DAILY PRN
Qty: 60 TABLET | Refills: 0 | Status: SHIPPED | OUTPATIENT
Start: 2024-02-22

## 2024-02-22 RX ORDER — FLUOXETINE HYDROCHLORIDE 20 MG/1
CAPSULE ORAL
Qty: 270 CAPSULE | Refills: 0 | Status: SHIPPED | OUTPATIENT
Start: 2024-02-22

## 2024-02-22 NOTE — TELEPHONE ENCOUNTER
Refill Request  FLUoxetine (PROZAC) 20 MG capsule       Last Seen: Last Seen Department: 12/1/2023  Last Seen by PCP: 9/26/2023    Last Written: 12/04/23    Next Appointment:   Future Appointments   Date Time Provider Department Center   2/26/2024  3:00 PM Tito Chowdhury, PT [facility]   3/5/2024  3:00 PM Sofy Akins MD [facility]     Future appointment scheduled    Requested Prescriptions     Pending Prescriptions Disp Refills    FLUoxetine (PROZAC) 20 MG capsule [Pharmacy Med Name: FLUOXETINE HCL 20 MG CAPSULE] 270 capsule 0     Sig: TAKE 3 CAPSULES BY MOUTH EVERY DAY    FLUoxetine (PROZAC) 20 MG capsule [[id number]]    Order Details  Dose, Route, Frequency: As Directed   Dispense Quantity: 270 capsule Refills: 0          Sig: TAKE 3 CAPSULES BY MOUTH EVERY DAY         Start Date: 12/04/23 End Date: --   Written Date: 12/04/23 Expiration Date: 12/03/24       Associated Diagnoses: Mild episode of recurrent major depressive disorder (HCC) [F33     [facility]/pharmacy #[id number] - [place], [place] - [address] [phone] - F [phone]

## 2024-02-22 NOTE — TELEPHONE ENCOUNTER
Refill Request       Return in about 3 months (around 3/1/2024) for f/u HTN, f/u Hyperlipidemia.       Last Seen: Last Seen Department: 12/1/2023  Last Seen by PCP: Visit date not found    Last Written: 01/09/24 qty 60 w/ 0     Next Appointment:   Future Appointments   Date Time Provider Department Center   2/26/2024  3:00 PM Tito Chowdhury PT [facility]   3/5/2024  3:00 PM Sofy Akins MD [facility]       Future appointment scheduled      Requested Prescriptions     Pending Prescriptions Disp Refills    methocarbamol (ROBAXIN) 500 MG tablet [Pharmacy Med Name: METHOCARBAMOL 500 MG TABLET] 60 tablet 0     Sig: TAKE 1-2 TABLETS BY MOUTH 3 TIMES DAILY AS NEEDED (BACK PAIN)

## 2024-02-27 NOTE — TELEPHONE ENCOUNTER
Refill Request       Last Seen: Last Seen Department: 12/1/2023  Last Seen by PCP: 9/26/2023    Last Written: 6/30/2023 90 with 1 refill     Next Appointment:   Future Appointments   Date Time Provider Department Center   3/5/2024  3:00 PM Sofy Akins MD [facility]   3/6/2024  2:20 PM Mirza Villasenor, PT [facility]             Requested Prescriptions     Pending Prescriptions Disp Refills    lisinopril (PRINIVIL;ZESTRIL) 20 MG tablet 90 tablet 1     Sig: TAKE 1 TABLET BY MOUTH EVERY DAY

## 2024-02-27 NOTE — TELEPHONE ENCOUNTER
Patient is calling requesting a refill of lisinopril (PRINIVIL;ZESTRIL) 20 MG tablet please send to (name of pharmacy) patients last OV 12/01/2023 and next OV 03/05/2024      please advise patient once medication is sent    Patients Luz Marina    Patients phone [phone] (home) [phone] (work)

## 2024-02-29 RX ORDER — LISINOPRIL 20 MG/1
TABLET ORAL
Qty: 90 TABLET | Refills: 1 | Status: SHIPPED | OUTPATIENT
Start: 2024-02-29

## 2024-03-05 ENCOUNTER — OFFICE VISIT (OUTPATIENT)
Dept: PRIMARY CARE CLINIC | Age: 59
End: 2024-03-05
Payer: COMMERCIAL

## 2024-03-05 VITALS
HEART RATE: 82 BPM | DIASTOLIC BLOOD PRESSURE: 68 MMHG | OXYGEN SATURATION: 99 % | TEMPERATURE: 97.9 F | SYSTOLIC BLOOD PRESSURE: 102 MMHG | BODY MASS INDEX: 42.64 KG/M2 | WEIGHT: 233.2 LBS

## 2024-03-05 DIAGNOSIS — I10 PRIMARY HYPERTENSION: Primary | ICD-10-CM

## 2024-03-05 DIAGNOSIS — E66.01 OBESITY, MORBID, BMI 40.0-49.9 (HCC): ICD-10-CM

## 2024-03-05 DIAGNOSIS — E78.2 MIXED HYPERLIPIDEMIA: ICD-10-CM

## 2024-03-05 PROBLEM — M51.36 DEGENERATIVE DISC DISEASE, LUMBAR: Status: ACTIVE | Noted: 2022-11-11

## 2024-03-05 PROBLEM — M51.369 DEGENERATIVE DISC DISEASE, LUMBAR: Status: ACTIVE | Noted: 2022-11-11

## 2024-03-05 PROCEDURE — 1036F TOBACCO NON-USER: CPT | Performed by: STUDENT IN AN ORGANIZED HEALTH CARE EDUCATION/TRAINING PROGRAM

## 2024-03-05 PROCEDURE — 3017F COLORECTAL CA SCREEN DOC REV: CPT | Performed by: STUDENT IN AN ORGANIZED HEALTH CARE EDUCATION/TRAINING PROGRAM

## 2024-03-05 PROCEDURE — 3074F SYST BP LT 130 MM HG: CPT | Performed by: STUDENT IN AN ORGANIZED HEALTH CARE EDUCATION/TRAINING PROGRAM

## 2024-03-05 PROCEDURE — G8427 DOCREV CUR MEDS BY ELIG CLIN: HCPCS | Performed by: STUDENT IN AN ORGANIZED HEALTH CARE EDUCATION/TRAINING PROGRAM

## 2024-03-05 PROCEDURE — G8482 FLU IMMUNIZE ORDER/ADMIN: HCPCS | Performed by: STUDENT IN AN ORGANIZED HEALTH CARE EDUCATION/TRAINING PROGRAM

## 2024-03-05 PROCEDURE — G8417 CALC BMI ABV UP PARAM F/U: HCPCS | Performed by: STUDENT IN AN ORGANIZED HEALTH CARE EDUCATION/TRAINING PROGRAM

## 2024-03-05 PROCEDURE — 3078F DIAST BP <80 MM HG: CPT | Performed by: STUDENT IN AN ORGANIZED HEALTH CARE EDUCATION/TRAINING PROGRAM

## 2024-03-05 PROCEDURE — 99214 OFFICE O/P EST MOD 30 MIN: CPT | Performed by: STUDENT IN AN ORGANIZED HEALTH CARE EDUCATION/TRAINING PROGRAM

## 2024-03-05 RX ORDER — LISINOPRIL 10 MG/1
10 TABLET ORAL DAILY
Qty: 90 TABLET | Refills: 1 | Status: SHIPPED | OUTPATIENT
Start: 2024-03-05

## 2024-03-05 ASSESSMENT — ENCOUNTER SYMPTOMS
COUGH: 0
VOMITING: 0
SORE THROAT: 0
CONSTIPATION: 0
BLOOD IN STOOL: 0
BACK PAIN: 1
NAUSEA: 0
ABDOMINAL PAIN: 0
DIARRHEA: 0
SHORTNESS OF BREATH: 0

## 2024-03-05 ASSESSMENT — PATIENT HEALTH QUESTIONNAIRE - PHQ9
7. TROUBLE CONCENTRATING ON THINGS, SUCH AS READING THE NEWSPAPER OR WATCHING TELEVISION: 0
8. MOVING OR SPEAKING SO SLOWLY THAT OTHER PEOPLE COULD HAVE NOTICED. OR THE OPPOSITE, BEING SO FIGETY OR RESTLESS THAT YOU HAVE BEEN MOVING AROUND A LOT MORE THAN USUAL: 0
SUM OF ALL RESPONSES TO PHQ QUESTIONS 1-9: 0
4. FEELING TIRED OR HAVING LITTLE ENERGY: 0
SUM OF ALL RESPONSES TO PHQ9 QUESTIONS 1 & 2: 0
6. FEELING BAD ABOUT YOURSELF - OR THAT YOU ARE A FAILURE OR HAVE LET YOURSELF OR YOUR FAMILY DOWN: 0
1. LITTLE INTEREST OR PLEASURE IN DOING THINGS: 0
3. TROUBLE FALLING OR STAYING ASLEEP: 0
SUM OF ALL RESPONSES TO PHQ QUESTIONS 1-9: 0
10. IF YOU CHECKED OFF ANY PROBLEMS, HOW DIFFICULT HAVE THESE PROBLEMS MADE IT FOR YOU TO DO YOUR WORK, TAKE CARE OF THINGS AT HOME, OR GET ALONG WITH OTHER PEOPLE: 0
9. THOUGHTS THAT YOU WOULD BE BETTER OFF DEAD, OR OF HURTING YOURSELF: 0
SUM OF ALL RESPONSES TO PHQ QUESTIONS 1-9: 0
2. FEELING DOWN, DEPRESSED OR HOPELESS: 0
5. POOR APPETITE OR OVEREATING: 0
SUM OF ALL RESPONSES TO PHQ QUESTIONS 1-9: 0

## 2024-03-05 NOTE — PROGRESS NOTES
There is no guarding or rebound.   Musculoskeletal:         General: Normal range of motion.      Cervical back: Normal range of motion.      Right lower leg: No edema.      Left lower leg: No edema.   Skin:     General: Skin is warm.      Capillary Refill: Capillary refill takes less than 2 seconds.      Coloration: Skin is not pale.   Neurological:      General: No focal deficit present.      Mental Status: She is alert and oriented to person, place, and time. Mental status is at baseline.   Psychiatric:         Mood and Affect: Mood normal.         Behavior: Behavior normal.              Lab Review: not applicable       Assessment/Plan:  Luz Marina was seen today for hypertension and cholesterol problem.    Diagnoses and all orders for this visit:    Primary hypertension  Comments:  - BP remains controlled today, nearing borderline hypotensive. No acute cardio-resp or neuro complaints. No c/o recent dizziness or falls since last appt. Given her recent near-falls and plans to increase physical activity, rec decrease Lisinopril to 10mg daily. Can cut her current 20mg tabs in half.   - f/u 1 month  Orders:  -     lisinopril (PRINIVIL;ZESTRIL) 10 MG tablet; Take 1 tablet by mouth daily    Obesity, morbid, BMI 40.0-49.9 ([facility])  Comments:  - Recent weight gain past few months. Likely d/t decreased ambulation with Hx hip arthroplasty in 2023. Difficulty losing weight.   - Goal to lose 20 lbs. Patient motivated to see Weight management. Will refer.   - Will start Zepbound SQ weekly. Rec use online coupon if insurance not covering (link sent). Counseled on side effects. Pt agreeable.   - f/u 4 weeks. Will then increase Zepbound to 0.5mg weekly.     Does the patient have an initial BMI greater than or equal to 30 kg/m2? - Yes  Does the patient have a BMI or greater than 27 kg/m2 in the presence of at least one weight related comorbid condition? - Yes  If the answer is yes to number 2 please list the comorbid condition.   Has

## 2024-03-06 ENCOUNTER — HOSPITAL ENCOUNTER (OUTPATIENT)
Dept: PHYSICAL THERAPY | Age: 59
Setting detail: THERAPIES SERIES
Discharge: HOME | End: 2024-03-06
Payer: COMMERCIAL

## 2024-03-06 DIAGNOSIS — M54.50 LUMBAR PAIN: Primary | ICD-10-CM

## 2024-03-06 DIAGNOSIS — R53.1 GENERALIZED WEAKNESS: ICD-10-CM

## 2024-03-06 PROCEDURE — 97161 PT EVAL LOW COMPLEX 20 MIN: CPT

## 2024-03-06 PROCEDURE — 97110 THERAPEUTIC EXERCISES: CPT

## 2024-03-06 NOTE — PLAN OF CARE
[facility]- Outpatient Rehabilitation and Therapy [address], [place] [address] office: [phone] fax: [phone]    Physical Therapy Initial Evaluation Certification      Dear Gerald Moctezuma,*,    We had the pleasure of evaluating the following patient for physical therapy services at [facility].  A summary of our findings can be found in the initial assessment below.  This includes our plan of care.  If you have any questions or concerns regarding these findings, please do not hesitate to contact me at the office phone number listed above.  Thank you for the referral.     Physician Signature:_______________________________Date:__________________  By signing above (or electronic signature), therapist’s plan is approved by physician       Physical Therapy: TREATMENT/PROGRESS NOTE   Patient: Luz Marina Fitzpatrick (58 y.o. female)   Examination Date: 2024   :  1965 MRN: [id number]   Visit #: 1   Insurance Allowable Auth Needed   40 HARD []Yes    [x]No    Insurance: Payor: [employer] / Plan: [employer] [address] / Product Type: *No Product type* /   Insurance ID: [id number] - (Commercial)  Secondary Insurance (if applicable):    Treatment Diagnosis:     ICD-10-CM    1. Lumbar pain  M54.50       2. Generalized weakness  R53.1          Medical Diagnosis:  Low back pain, unspecified [M54.50]  Other intervertebral disc degeneration, lumbar region [M51.36]  Other secondary scoliosis, site unspecified [M41.50]   Referring Physician: Gerald Moctezuma,*  PCP: Sofy Akins MD       Plan of care signed (Y/N):     Date of Patient follow up with Physician:      Progress Report/POC: EVAL today  Progress note due: 2024 (OR 10 visits /OR AUTH LIMITS, whichever is less)  POC update due: 2024                                              Precautions/ Contra-indications:           Latex allergy:

## 2024-03-07 ENCOUNTER — PATIENT MESSAGE (OUTPATIENT)
Dept: PRIMARY CARE CLINIC | Age: 59
End: 2024-03-07

## 2024-03-07 DIAGNOSIS — E66.01 OBESITY, MORBID, BMI 40.0-49.9 (HCC): Primary | ICD-10-CM

## 2024-03-07 RX ORDER — SEMAGLUTIDE 0.25 MG/.5ML
0.25 INJECTION, SOLUTION SUBCUTANEOUS
Qty: 2 ML | Refills: 0 | Status: SHIPPED | OUTPATIENT
Start: 2024-03-07

## 2024-03-07 NOTE — TELEPHONE ENCOUNTER
From: Luz Marina Fitzpatrick  To: Domo Bearden MD  Sent: 3/7/2024 10:04 AM EST  Subject: Zepbound Presciption    I received your message about completing information for Zepbound. However, there was no link or attachment with your message. I logged onto Zepbound’s website and completed the enrollment form. [facility] said the prescription was delayed and requested I contact you. Please advise    Thank-you.  Luz Marina Fitzpatrick

## 2024-03-13 ENCOUNTER — HOSPITAL ENCOUNTER (OUTPATIENT)
Dept: PHYSICAL THERAPY | Age: 59
Setting detail: THERAPIES SERIES
Discharge: HOME | End: 2024-03-13
Payer: COMMERCIAL

## 2024-03-13 PROCEDURE — 97140 MANUAL THERAPY 1/> REGIONS: CPT

## 2024-03-13 PROCEDURE — 97110 THERAPEUTIC EXERCISES: CPT

## 2024-03-13 NOTE — PLAN OF CARE
[facility]- Outpatient Rehabilitation and Therapy [address], [place] [address] office: [phone] fax: [phone]    Physical Therapy: TREATMENT/PROGRESS NOTE   Patient: Luz Marina Fitzpatrick (58 y.o. female)   Examination Date: 2024   :  1965 MRN: [id number]   Visit #: 2   Insurance Allowable Auth Needed   40 HARD []Yes    [x]No    Insurance: Payor: [employer] / Plan: MEDICAL [facility] [address] / Product Type: *No Product type* /   Insurance ID: [id number] - (Commercial)  Secondary Insurance (if applicable):    Treatment Diagnosis:     ICD-10-CM    1. Lumbar pain  M54.50       2. Generalized weakness  R53.1          Medical Diagnosis:  Low back pain, unspecified [M54.50]  Other intervertebral disc degeneration, lumbar region [M51.36]  Other secondary scoliosis, site unspecified [M41.50]   Referring Physician: Gerald Moctezuma,*  PCP: Sofy Akins MD       Plan of care signed (Y/N):     Date of Patient follow up with Physician:      Progress Report/POC: NO  Progress note due: 2024 (OR 10 visits /OR AUTH LIMITS, whichever is less)  POC update due: 2024                                              Precautions/ Contra-indications:           Latex allergy:  NO  Pacemaker:    NO  Contraindications for Manipulation: None  Other:    Red Flags:  None    C-SSRS Triggered by Intake questionnaire:   [x] No, Questionnaire did not trigger screening.   [] Yes, Patient intake triggered further evaluation      [] C-SSRS Screening completed  [] PCP notified via Plan of Care  [] Emergency services notified     Preferred Language for Healthcare:   [x] English       [] other:    SUBJECTIVE EXAMINATION     Patient stated complaint: Pt reports a little sore from weekend. Doing fine with HEP.    OBJECTIVE EXAMINATION      Test used Initial score  3/6/24 2024   Pain Summary VAS 0-5 2   Functional questionnaire Modified Oswestry 50 28%

## 2024-03-19 RX ORDER — ATORVASTATIN CALCIUM 10 MG/1
10 TABLET, FILM COATED ORAL DAILY
Qty: 30 TABLET | Refills: 5 | Status: SHIPPED | OUTPATIENT
Start: 2024-03-19

## 2024-03-19 NOTE — TELEPHONE ENCOUNTER
Refill Request       Last Seen: Last Seen Department: 3/5/2024  Last Seen by PCP: 9/26/2023    Last Written: 9/19/23 qty 90 w/1     Next Appointment:   Future Appointments   Date Time Provider Department Center   3/20/2024  2:20 PM [facility]   3/27/2024  2:00 PM Mirza Villasenor, PT [facility]   4/3/2024 10:30 AM Domo Bearden MD [facility]       Future appointment scheduled      Requested Prescriptions     Pending Prescriptions Disp Refills    atorvastatin (LIPITOR) 10 MG tablet [Pharmacy Med Name: ATORVASTATIN 10 MG TABLET] 30 tablet 5     Sig: TAKE 1 TABLET BY MOUTH EVERY DAY

## 2024-03-20 ENCOUNTER — HOSPITAL ENCOUNTER (OUTPATIENT)
Dept: PHYSICAL THERAPY | Age: 59
Setting detail: THERAPIES SERIES
Discharge: HOME | End: 2024-03-20
Payer: COMMERCIAL

## 2024-03-20 PROCEDURE — 97110 THERAPEUTIC EXERCISES: CPT

## 2024-03-20 PROCEDURE — 97140 MANUAL THERAPY 1/> REGIONS: CPT

## 2024-03-20 NOTE — PLAN OF CARE
03/20/2024    Note: Portions of this note have been templated and/or copied from initial evaluation, reassessments and prior notes for documentation efficiency.

## 2024-03-27 ENCOUNTER — APPOINTMENT (OUTPATIENT)
Dept: PHYSICAL THERAPY | Age: 59
End: 2024-03-27
Payer: COMMERCIAL

## 2024-03-28 DIAGNOSIS — M54.50 CHRONIC RIGHT-SIDED LOW BACK PAIN, UNSPECIFIED WHETHER SCIATICA PRESENT: ICD-10-CM

## 2024-03-28 DIAGNOSIS — M54.50 ACUTE LEFT-SIDED LOW BACK PAIN, UNSPECIFIED WHETHER SCIATICA PRESENT: ICD-10-CM

## 2024-03-28 DIAGNOSIS — G89.29 CHRONIC RIGHT-SIDED LOW BACK PAIN, UNSPECIFIED WHETHER SCIATICA PRESENT: ICD-10-CM

## 2024-03-28 RX ORDER — METHOCARBAMOL 500 MG/1
500-1000 TABLET, FILM COATED ORAL 3 TIMES DAILY PRN
Qty: 60 TABLET | Refills: 0 | Status: SHIPPED | OUTPATIENT
Start: 2024-03-28

## 2024-03-28 NOTE — TELEPHONE ENCOUNTER
Refill Request       Last Seen: Last Seen Department: 3/5/2024  Last Seen by PCP: 9/26/2023    Last Written: 2/22/24 60 with 0    Next Appointment:   Future Appointments   Date Time Provider Department Center   4/3/2024  1:40 PM [facility]   4/10/2024  1:40 PM [facility]   4/17/2024  1:40 PM [facility]   4/26/2024 10:00 AM Domo Bearden MD [facility]         Requested Prescriptions     Pending Prescriptions Disp Refills    methocarbamol (ROBAXIN) 500 MG tablet [Pharmacy Med Name: METHOCARBAMOL 500 MG TABLET] 60 tablet 0     Sig: TAKE 1-2 TABLETS BY MOUTH 3 TIMES DAILY AS NEEDED (BACK PAIN)

## 2024-04-03 ENCOUNTER — HOSPITAL ENCOUNTER (OUTPATIENT)
Dept: PHYSICAL THERAPY | Age: 59
Setting detail: THERAPIES SERIES
Discharge: HOME | End: 2024-04-03
Payer: COMMERCIAL

## 2024-04-03 PROCEDURE — 97110 THERAPEUTIC EXERCISES: CPT

## 2024-04-03 PROCEDURE — 97140 MANUAL THERAPY 1/> REGIONS: CPT

## 2024-04-03 NOTE — FLOWSHEET NOTE
[facility]- Outpatient Rehabilitation and Therapy [address], [place] [address] office: [phone] fax: [phone]    Physical Therapy: TREATMENT/PROGRESS NOTE   Patient: Luz Marina Fitzpatrick (58 y.o. female)   Examination Date: 2024   :  1965 MRN: [id number]   Visit #: 4   Insurance Allowable Auth Needed   40 HARD []Yes    [x]No    Insurance: Payor: [employer] / Plan: MEDICAL [employer] [address] / Product Type: *No Product type* /   Insurance ID: [id number] - (Commercial)  Secondary Insurance (if applicable):    Treatment Diagnosis:     ICD-10-CM    1. Lumbar pain  M54.50       2. Generalized weakness  R53.1          Medical Diagnosis:  Low back pain, unspecified [M54.50]  Other intervertebral disc degeneration, lumbar region [M51.36]  Other secondary scoliosis, site unspecified [M41.50]   Referring Physician: Gerald Moctezuma,*  PCP: Sofy Akins MD       Plan of care signed (Y/N):     Date of Patient follow up with Physician:      Progress Report/POC: NO  Progress note due: 2024 (OR 10 visits /OR AUTH LIMITS, whichever is less)  POC update due: 2024                                              Precautions/ Contra-indications:           Latex allergy:  NO  Pacemaker:    NO  Contraindications for Manipulation: None  Other:    Red Flags:  None    C-SSRS Triggered by Intake questionnaire:   [x] No, Questionnaire did not trigger screening.   [] Yes, Patient intake triggered further evaluation      [] C-SSRS Screening completed  [] PCP notified via Plan of Care  [] Emergency services notified     Preferred Language for Healthcare:   [x] English       [] other:    SUBJECTIVE EXAMINATION     Patient stated complaint: Pt reports she was sore after manual from last session. Wants to do a little less of that today as it hurting today.     OBJECTIVE EXAMINATION      Test used Initial score  3/6/24 2024   Pain Summary VAS 0-5 1

## 2024-04-10 ENCOUNTER — HOSPITAL ENCOUNTER (OUTPATIENT)
Dept: PHYSICAL THERAPY | Age: 59
Setting detail: THERAPIES SERIES
Discharge: HOME | End: 2024-04-10
Payer: COMMERCIAL

## 2024-04-10 PROCEDURE — 97110 THERAPEUTIC EXERCISES: CPT

## 2024-04-10 PROCEDURE — 97140 MANUAL THERAPY 1/> REGIONS: CPT

## 2024-04-10 NOTE — FLOWSHEET NOTE
(74621)    [place]. Traction (25851)     Gait (78501)    Dry Needle 1-2 muscle (40305)     Aquatic Therex (97113)    Dry Needle 3+ muscle (20561)     Iontophoresis (75877)    VASO (63381)     Ultrasound (72414)    Group Therapy (87971)     Estim Attended (24712)    Canalith Repositioning (91205)     Other:    Other:    Total Timed Code Tx Minutes 40 3       Total Treatment Minutes 40        Charge Justification:  [x] (36207) THERAPEUTIC EXERCISE - Provided verbal/tactile cueing for activities related to strengthening, flexibility, endurance, ROM performed to prevent loss of range of motion, maintain or improve muscular strength or increase flexibility, following either an injury or surgery.   [x] (83536) NEUROMUSCULAR RE-EDUCATION - Therapeutic procedure, 1 or more areas, each 15 minutes; neuromuscular reeducation of movement, balance, coordination, kinesthetic sense, posture, and/or proprioception for sitting and/or standing activities  [x] (94922) MANUAL THERAPY -  Manual therapy techniques, 1 or more regions, each 15 minutes (Mobilization/manipulation, manual lymphatic drainage, manual traction) for the purpose of modulating pain, promoting relaxation,  increasing ROM, reducing/eliminating soft tissue swelling/inflammation/restriction, improving soft tissue extensibility and allowing for proper ROM for normal function with self care, mobility, lifting and ambulation  [] (20560) Needle insertion(s) without injection; 1 or 2 muscle(s).  [] (20561) Needle insertion(s) without injection; 3 or more muscle(s)  [] (97032) ATTENDED ESTIM. Application of a modality to 1 or more areas; electrical stimulation (manual), each 15 minutes. Attended electrical stimulation requires direct (1-on-1) contact with the patient by the qualified professional/qualified personnel in providing electrical stimulation manually through the use of probes or other devices.    GOALS     Patient stated goal: Pain free  Status: [x] Progressing: []

## 2024-04-17 ENCOUNTER — HOSPITAL ENCOUNTER (OUTPATIENT)
Dept: PHYSICAL THERAPY | Age: 59
Setting detail: THERAPIES SERIES
End: 2024-04-17
Payer: COMMERCIAL

## 2024-04-22 ENCOUNTER — HOSPITAL ENCOUNTER (OUTPATIENT)
Dept: PHYSICAL THERAPY | Age: 59
Setting detail: THERAPIES SERIES
End: 2024-04-22
Payer: COMMERCIAL

## 2024-05-09 DIAGNOSIS — F33.0 MILD EPISODE OF RECURRENT MAJOR DEPRESSIVE DISORDER (HCC): ICD-10-CM

## 2024-05-09 RX ORDER — FLUOXETINE HYDROCHLORIDE 20 MG/1
CAPSULE ORAL
Qty: 270 CAPSULE | Refills: 0 | Status: SHIPPED | OUTPATIENT
Start: 2024-05-09

## 2024-05-09 NOTE — TELEPHONE ENCOUNTER
Refill Request   FLUoxetine (PROZAC) 20 MG capsule     Last Seen: Last Seen Department: 3/5/2024  Last Seen by PCP: 9/26/2023    Last Written: 02/22/24    Next Appointment:   Future Appointments   Date Time Provider Department Center   6/3/2024  3:00 PM Domo Bearden MD [facility]       Future appointment scheduled      Requested Prescriptions     Pending Prescriptions Disp Refills    FLUoxetine (PROZAC) 20 MG capsule [Pharmacy Med Name: FLUOXETINE HCL 20 MG CAPSULE] 270 capsule 0     Sig: TAKE 3 CAPSULES BY MOUTH EVERY DAY      FLUoxetine (PROZAC) 20 MG capsule [[id number]]    Order Details  Dose, Route, Frequency: As Directed   Dispense Quantity: 270 capsule Refills: 0          Sig: TAKE 3 CAPSULES BY MOUTH EVERY DAY         Start Date: 02/22/24 End Date: --   Written Date: 02/22/24 Expiration Date: 02/21/25       Associated Diagnoses: Mild episode of recurrent major depressive disorder (HCC) [F33.0]   Original Order: FLUoxetine (PROZAC) 20 MG capsule [[id number]]   Providers    Authorizing Provider: Sofy Akins MD  NPI: [id number]   Ordering User: Sofy Akins MD          Pharmacy    [facility]/pharmacy #[id number] [place], [place] [address] [phone] - F [phone]

## 2024-05-13 DIAGNOSIS — M54.50 CHRONIC RIGHT-SIDED LOW BACK PAIN, UNSPECIFIED WHETHER SCIATICA PRESENT: ICD-10-CM

## 2024-05-13 DIAGNOSIS — M54.50 ACUTE LEFT-SIDED LOW BACK PAIN, UNSPECIFIED WHETHER SCIATICA PRESENT: ICD-10-CM

## 2024-05-13 DIAGNOSIS — G89.29 CHRONIC RIGHT-SIDED LOW BACK PAIN, UNSPECIFIED WHETHER SCIATICA PRESENT: ICD-10-CM

## 2024-05-13 NOTE — TELEPHONE ENCOUNTER
Refill Request       Last Seen: Last Seen Department: 3/5/2024  Last Seen by PCP: 9/26/2023    Last Written: 3/28/24 60 tabs 0 refills     Next Appointment:   Future Appointments   Date Time Provider Department Center   6/3/2024  3:00 PM Domo Bearden MD [facility]             Requested Prescriptions     Pending Prescriptions Disp Refills    methocarbamol (ROBAXIN) 500 MG tablet [Pharmacy Med Name: METHOCARBAMOL 500 MG TABLET] 60 tablet 0     Sig: TAKE 1-2 TABLETS BY MOUTH 3 TIMES DAILY AS NEEDED (BACK PAIN)

## 2024-05-14 RX ORDER — METHOCARBAMOL 500 MG/1
500-1000 TABLET, FILM COATED ORAL 3 TIMES DAILY PRN
Qty: 60 TABLET | Refills: 0 | Status: SHIPPED | OUTPATIENT
Start: 2024-05-14

## 2024-06-03 ENCOUNTER — OFFICE VISIT (OUTPATIENT)
Dept: PRIMARY CARE CLINIC | Age: 59
End: 2024-06-03

## 2024-06-03 VITALS
HEIGHT: 62 IN | SYSTOLIC BLOOD PRESSURE: 126 MMHG | OXYGEN SATURATION: 96 % | HEART RATE: 82 BPM | BODY MASS INDEX: 41.59 KG/M2 | DIASTOLIC BLOOD PRESSURE: 68 MMHG | WEIGHT: 226 LBS

## 2024-06-03 DIAGNOSIS — I10 PRIMARY HYPERTENSION: ICD-10-CM

## 2024-06-03 DIAGNOSIS — E66.01 OBESITY, MORBID, BMI 40.0-49.9 (HCC): Primary | ICD-10-CM

## 2024-06-03 RX ORDER — SEMAGLUTIDE 0.5 MG/.5ML
0.5 INJECTION, SOLUTION SUBCUTANEOUS
Qty: 2 ML | Refills: 5
Start: 2024-06-03

## 2024-06-03 NOTE — PROGRESS NOTES
PROGRESS NOTE   [facility]                                  [address], [address], [place] [address]         Phone: [phone]    Date of Service:  6/3/2024     Patient ID: Darnell Fitzpatrick is a 58 y.o. female      Subjective:     CC: HTN, Weight management f/u    HPI    Hypertension f/u:  - Current regimen: Lisinopril 10mg daily  - Home BP: Not measuring.   - Compliance: Yes  - Side effects: No concerns  - Acute symptoms: Denies concerns of chest pain, SOB, ankle edema, headaches, dizziness, syncope. Denies concerns of near falls since last appt.       Weight management f/u:  -Initially applied for Zepbound at last zander and referred to [email or web address]. Insurance denied Zepbound, so ordered Wegovy 0.25mg SQ weekly. Paying all out of pocket.   - Has only completed 1 month of Wegovy 0.25mg so far.   - Weight today improved, 226 lbs.   - Noticing decreased appetite and feels full quicker while eating.   - Denies concerns of side effects.   - Seeing [facility] still.        ROS: Pertinent positives and negatives in HPI      Vitals:    06/03/24 1509   BP: 126/68   Site: Left Upper Arm   Position: Sitting   Cuff Size: Medium Adult   Pulse: 82   SpO2: 96%   Weight: 102.5 kg (226 lb)   Height: 1.575 m (5' 2\")       Allergies:  Patient has no known allergies.    Outpatient Medications Marked as Taking for the 6/3/24 encounter (Office Visit) with Domo Bearden MD   Medication Sig Dispense Refill    Semaglutide-Weight Management (WEGOVY) 0.5 MG/0.5ML SOAJ SC injection Inject 0.5 mg into the skin every 7 days 2 mL 5    methocarbamol (ROBAXIN) 500 MG tablet TAKE 1-2 TABLETS BY MOUTH 3 TIMES DAILY AS NEEDED (BACK PAIN) 60 tablet 0    FLUoxetine (PROZAC) 20 MG capsule TAKE 3 CAPSULES BY MOUTH EVERY  capsule 0    atorvastatin (LIPITOR) 10 MG tablet TAKE 1 TABLET BY MOUTH EVERY DAY 30 tablet 5    lisinopril

## 2024-06-12 DIAGNOSIS — M54.50 ACUTE LEFT-SIDED LOW BACK PAIN, UNSPECIFIED WHETHER SCIATICA PRESENT: ICD-10-CM

## 2024-06-12 DIAGNOSIS — G89.29 CHRONIC RIGHT-SIDED LOW BACK PAIN, UNSPECIFIED WHETHER SCIATICA PRESENT: ICD-10-CM

## 2024-06-12 DIAGNOSIS — M54.50 CHRONIC RIGHT-SIDED LOW BACK PAIN, UNSPECIFIED WHETHER SCIATICA PRESENT: ICD-10-CM

## 2024-06-12 NOTE — TELEPHONE ENCOUNTER
Refill Request       Last Seen: Last Seen Department: 6/3/2024  Last Seen by PCP: 9/26/2023 5/14/24 60 0 refills   Last Written:     Next Appointment:   Future Appointments   Date Time Provider Department Center   7/9/2024  3:00 PM Sofy Akins MD [facility]             Requested Prescriptions     Pending Prescriptions Disp Refills    methocarbamol (ROBAXIN) 500 MG tablet [Pharmacy Med Name: METHOCARBAMOL 500 MG TABLET] 60 tablet 0     Sig: TAKE 1-2 TABLETS BY MOUTH 3 TIMES DAILY AS NEEDED (BACK PAIN)

## 2024-06-14 RX ORDER — METHOCARBAMOL 500 MG/1
500-1000 TABLET, FILM COATED ORAL 3 TIMES DAILY PRN
Qty: 60 TABLET | Refills: 0 | Status: SHIPPED | OUTPATIENT
Start: 2024-06-14

## 2024-07-23 DIAGNOSIS — M54.50 ACUTE LEFT-SIDED LOW BACK PAIN, UNSPECIFIED WHETHER SCIATICA PRESENT: ICD-10-CM

## 2024-07-23 DIAGNOSIS — G89.29 CHRONIC RIGHT-SIDED LOW BACK PAIN, UNSPECIFIED WHETHER SCIATICA PRESENT: ICD-10-CM

## 2024-07-23 DIAGNOSIS — M54.50 CHRONIC RIGHT-SIDED LOW BACK PAIN, UNSPECIFIED WHETHER SCIATICA PRESENT: ICD-10-CM

## 2024-07-23 RX ORDER — METHOCARBAMOL 500 MG/1
500-1000 TABLET, FILM COATED ORAL 3 TIMES DAILY PRN
Qty: 60 TABLET | Refills: 0 | Status: SHIPPED | OUTPATIENT
Start: 2024-07-23

## 2024-07-23 NOTE — TELEPHONE ENCOUNTER
Refill Request       Last Seen: Last Seen Department: 6/3/2024  Last Seen by PCP: 9/26/2023    Last Written: 6/14/24 60 with 0    Next Appointment:   Future Appointments   Date Time Provider Department Center   9/25/2024 11:30 AM Sofy Akins MD [facility]       Requested Prescriptions     Pending Prescriptions Disp Refills    methocarbamol (ROBAXIN) 500 MG tablet [Pharmacy Med Name: METHOCARBAMOL 500 MG TABLET] 60 tablet 0     Sig: TAKE 1-2 TABLETS BY MOUTH 3 TIMES DAILY AS NEEDED (BACK PAIN)

## 2024-08-09 DIAGNOSIS — F33.0 MILD EPISODE OF RECURRENT MAJOR DEPRESSIVE DISORDER (HCC): ICD-10-CM

## 2024-08-09 RX ORDER — FLUOXETINE HYDROCHLORIDE 20 MG/1
CAPSULE ORAL
Qty: 270 CAPSULE | Refills: 0 | Status: SHIPPED | OUTPATIENT
Start: 2024-08-09

## 2024-08-09 NOTE — TELEPHONE ENCOUNTER
Refill Request       Last Seen: Last Seen Department: 6/3/2024  Last Seen by PCP: Visit date not found    Last Written: 5/9/24 270  with 0    Next Appointment:   Future Appointments   Date Time Provider Department Center   9/25/2024 11:30 AM Sofy Akins MD [facility]     Requested Prescriptions     Pending Prescriptions Disp Refills    FLUoxetine (PROZAC) 20 MG capsule [Pharmacy Med Name: FLUOXETINE HCL 20 MG CAPSULE] 270 capsule 0     Sig: TAKE 3 CAPSULES BY MOUTH EVERY DAY

## 2024-09-03 DIAGNOSIS — G89.29 CHRONIC RIGHT-SIDED LOW BACK PAIN, UNSPECIFIED WHETHER SCIATICA PRESENT: ICD-10-CM

## 2024-09-03 DIAGNOSIS — M54.50 ACUTE LEFT-SIDED LOW BACK PAIN, UNSPECIFIED WHETHER SCIATICA PRESENT: ICD-10-CM

## 2024-09-03 DIAGNOSIS — M54.50 CHRONIC RIGHT-SIDED LOW BACK PAIN, UNSPECIFIED WHETHER SCIATICA PRESENT: ICD-10-CM

## 2024-09-03 NOTE — TELEPHONE ENCOUNTER
Refill Request       Last Seen: Last Seen Department: 6/3/2024  Last Seen by PCP: 9/26/2023    Last Written: 7/23/24 60 with 0    Next Appointment:   Future Appointments   Date Time Provider Department Center   9/6/2024  3:45 PM JESSI EG REY BOWEN EG [facility]   9/25/2024 11:30 AM Sofy Akins MD [facility]     Requested Prescriptions     Pending Prescriptions Disp Refills    methocarbamol (ROBAXIN) 500 MG tablet 60 tablet 0     Sig: Take 1-2 tablets by mouth 3 times daily as needed (back pain)

## 2024-09-04 RX ORDER — METHOCARBAMOL 500 MG/1
500-1000 TABLET, FILM COATED ORAL 3 TIMES DAILY PRN
Qty: 60 TABLET | Refills: 0 | Status: SHIPPED | OUTPATIENT
Start: 2024-09-04

## 2024-09-06 ENCOUNTER — HOSPITAL ENCOUNTER (OUTPATIENT)
Dept: WOMENS IMAGING | Age: 59
Discharge: HOME | End: 2024-09-06
Payer: COMMERCIAL

## 2024-09-06 VITALS — HEIGHT: 62 IN | WEIGHT: 230 LBS | BODY MASS INDEX: 42.33 KG/M2

## 2024-09-06 DIAGNOSIS — Z12.31 VISIT FOR SCREENING MAMMOGRAM: ICD-10-CM

## 2024-09-06 PROCEDURE — 77063 BREAST TOMOSYNTHESIS BI: CPT

## 2024-09-12 ENCOUNTER — OFFICE VISIT (OUTPATIENT)
Dept: PRIMARY CARE CLINIC | Age: 59
End: 2024-09-12

## 2024-09-12 ENCOUNTER — HOSPITAL ENCOUNTER (OUTPATIENT)
Age: 59
Discharge: HOME | End: 2024-09-12
Payer: COMMERCIAL

## 2024-09-12 VITALS
OXYGEN SATURATION: 98 % | HEART RATE: 75 BPM | WEIGHT: 239.4 LBS | SYSTOLIC BLOOD PRESSURE: 130 MMHG | DIASTOLIC BLOOD PRESSURE: 78 MMHG | TEMPERATURE: 98.5 F | BODY MASS INDEX: 43.79 KG/M2

## 2024-09-12 DIAGNOSIS — R53.83 OTHER FATIGUE: ICD-10-CM

## 2024-09-12 DIAGNOSIS — J02.9 SORE THROAT: Primary | ICD-10-CM

## 2024-09-12 DIAGNOSIS — R05.1 ACUTE COUGH: ICD-10-CM

## 2024-09-12 DIAGNOSIS — R09.82 POSTNASAL DRIP: ICD-10-CM

## 2024-09-12 DIAGNOSIS — R06.2 DIFFUSE WHEEZING: ICD-10-CM

## 2024-09-12 DIAGNOSIS — J02.9 SORE THROAT: ICD-10-CM

## 2024-09-12 LAB
HETEROPH AB BLD QL IA: NEGATIVE
INFLUENZA A ANTIBODY: NEGATIVE
INFLUENZA B ANTIBODY: NEGATIVE
S PYO AG THROAT QL: NORMAL
SARS-COV-2: NEGATIVE

## 2024-09-12 PROCEDURE — 86308 HETEROPHILE ANTIBODY SCREEN: CPT

## 2024-09-12 PROCEDURE — 36415 COLL VENOUS BLD VENIPUNCTURE: CPT

## 2024-09-12 RX ORDER — ALBUTEROL SULFATE 90 UG/1
2 AEROSOL, METERED RESPIRATORY (INHALATION) 4 TIMES DAILY PRN
Qty: 18 G | Refills: 0 | Status: SHIPPED | OUTPATIENT
Start: 2024-09-12

## 2024-09-12 RX ORDER — BENZONATATE 100 MG/1
100 CAPSULE ORAL 3 TIMES DAILY PRN
Qty: 30 CAPSULE | Refills: 0 | Status: SHIPPED | OUTPATIENT
Start: 2024-09-12 | End: 2024-09-22

## 2024-09-12 RX ORDER — GUAIFENESIN 600 MG/1
600 TABLET, EXTENDED RELEASE ORAL 2 TIMES DAILY
Qty: 30 TABLET | Refills: 0 | Status: SHIPPED | OUTPATIENT
Start: 2024-09-12 | End: 2024-09-27

## 2024-09-13 DIAGNOSIS — I10 PRIMARY HYPERTENSION: ICD-10-CM

## 2024-09-13 RX ORDER — LISINOPRIL 10 MG/1
10 TABLET ORAL DAILY
Qty: 90 TABLET | Refills: 1 | Status: SHIPPED | OUTPATIENT
Start: 2024-09-13

## 2024-09-25 ENCOUNTER — OFFICE VISIT (OUTPATIENT)
Dept: PRIMARY CARE CLINIC | Age: 59
End: 2024-09-25
Payer: COMMERCIAL

## 2024-09-25 ENCOUNTER — HOSPITAL ENCOUNTER (OUTPATIENT)
Age: 59
Discharge: HOME | End: 2024-09-25
Payer: COMMERCIAL

## 2024-09-25 VITALS
BODY MASS INDEX: 43.87 KG/M2 | OXYGEN SATURATION: 99 % | TEMPERATURE: 97.2 F | SYSTOLIC BLOOD PRESSURE: 126 MMHG | DIASTOLIC BLOOD PRESSURE: 72 MMHG | HEART RATE: 83 BPM | WEIGHT: 238.4 LBS | HEIGHT: 62 IN

## 2024-09-25 DIAGNOSIS — E66.01 OBESITY, MORBID, BMI 40.0-49.9: ICD-10-CM

## 2024-09-25 DIAGNOSIS — Z00.00 HEALTHCARE MAINTENANCE: ICD-10-CM

## 2024-09-25 DIAGNOSIS — Z00.00 ENCOUNTER FOR WELL ADULT EXAM WITHOUT ABNORMAL FINDINGS: Primary | ICD-10-CM

## 2024-09-25 DIAGNOSIS — Z23 NEED FOR INFLUENZA VACCINATION: ICD-10-CM

## 2024-09-25 LAB
CHOLEST SERPL-MCNC: 170 MG/DL (ref 0–199)
EST. AVERAGE GLUCOSE BLD GHB EST-MCNC: 105.4 MG/DL
HBA1C MFR BLD: 5.3 %
HBV SURFACE AB SERPL IA-ACNC: >1000 MIU/ML
HDLC SERPL-MCNC: 58 MG/DL (ref 40–60)
LDLC SERPL CALC-MCNC: 97 MG/DL
TRIGL SERPL-MCNC: 75 MG/DL (ref 0–150)
VLDLC SERPL CALC-MCNC: 15 MG/DL

## 2024-09-25 PROCEDURE — 83036 HEMOGLOBIN GLYCOSYLATED A1C: CPT

## 2024-09-25 PROCEDURE — 3074F SYST BP LT 130 MM HG: CPT

## 2024-09-25 PROCEDURE — 36415 COLL VENOUS BLD VENIPUNCTURE: CPT

## 2024-09-25 PROCEDURE — 80061 LIPID PANEL: CPT

## 2024-09-25 PROCEDURE — 86706 HEP B SURFACE ANTIBODY: CPT

## 2024-09-25 PROCEDURE — 99396 PREV VISIT EST AGE 40-64: CPT

## 2024-09-25 PROCEDURE — 3078F DIAST BP <80 MM HG: CPT

## 2024-09-25 ASSESSMENT — ENCOUNTER SYMPTOMS
SHORTNESS OF BREATH: 0
DIARRHEA: 0
NAUSEA: 0
CONSTIPATION: 0
VOMITING: 0

## 2024-10-02 RX ORDER — ATORVASTATIN CALCIUM 10 MG/1
10 TABLET, FILM COATED ORAL DAILY
Qty: 30 TABLET | Refills: 5 | Status: SHIPPED | OUTPATIENT
Start: 2024-10-02

## 2024-10-02 NOTE — TELEPHONE ENCOUNTER
Refill Request       Last Seen: Last Seen Department: 9/25/2024  Last Seen by PCP: 9/26/2023    Last Written: 3/19/24 30 with 5    Next Appointment:   Future Appointments   Date Time Provider Department Center   9/29/2025 11:30 AM Sofy Akins MD [facility]       Future appointment scheduled      Requested Prescriptions     Pending Prescriptions Disp Refills    atorvastatin (LIPITOR) 10 MG tablet [Pharmacy Med Name: ATORVASTATIN 10 MG TABLET] 30 tablet 5     Sig: TAKE 1 TABLET BY MOUTH EVERY DAY

## 2024-10-15 ENCOUNTER — TELEPHONE (OUTPATIENT)
Dept: PRIMARY CARE CLINIC | Age: 59
End: 2024-10-15

## 2024-10-15 NOTE — TELEPHONE ENCOUNTER
Patient is calling requesting a refill of   Semaglutide-Weight Management (WEGOVY) 0.5 MG/0.5ML SOAJ SC injection  please send to [facility]  patients last OV 09.25.24 and next OV 09.29.25.   please advise patient once medication is sent

## 2024-10-22 DIAGNOSIS — M54.50 ACUTE LEFT-SIDED LOW BACK PAIN, UNSPECIFIED WHETHER SCIATICA PRESENT: ICD-10-CM

## 2024-10-22 DIAGNOSIS — M54.50 CHRONIC RIGHT-SIDED LOW BACK PAIN, UNSPECIFIED WHETHER SCIATICA PRESENT: ICD-10-CM

## 2024-10-22 DIAGNOSIS — G89.29 CHRONIC RIGHT-SIDED LOW BACK PAIN, UNSPECIFIED WHETHER SCIATICA PRESENT: ICD-10-CM

## 2024-10-22 RX ORDER — METHOCARBAMOL 500 MG/1
500-1000 TABLET, FILM COATED ORAL 3 TIMES DAILY PRN
Qty: 60 TABLET | Refills: 0 | Status: SHIPPED | OUTPATIENT
Start: 2024-10-22

## 2024-10-22 NOTE — TELEPHONE ENCOUNTER
Refill Request       Last Seen: Last Seen Department: 9/25/2024  Last Seen by PCP: 9/26/2023    Last Written: 9/4/24 60 with 0    Next Appointment:   Future Appointments   Date Time Provider Department Center   9/29/2025 11:30 AM Sofy Akins MD [facility] DEP       Future appointment scheduled      Requested Prescriptions     Pending Prescriptions Disp Refills    methocarbamol (ROBAXIN) 500 MG tablet [Pharmacy Med Name: METHOCARBAMOL 500 MG TABLET] 60 tablet 0     Sig: TAKE 1-2 TABLETS BY MOUTH 3 TIMES DAILY AS NEEDED (BACK PAIN)

## 2024-11-19 DIAGNOSIS — M54.50 CHRONIC RIGHT-SIDED LOW BACK PAIN, UNSPECIFIED WHETHER SCIATICA PRESENT: ICD-10-CM

## 2024-11-19 DIAGNOSIS — G89.29 CHRONIC RIGHT-SIDED LOW BACK PAIN, UNSPECIFIED WHETHER SCIATICA PRESENT: ICD-10-CM

## 2024-11-19 DIAGNOSIS — M54.50 ACUTE LEFT-SIDED LOW BACK PAIN, UNSPECIFIED WHETHER SCIATICA PRESENT: ICD-10-CM

## 2024-11-19 RX ORDER — METHOCARBAMOL 500 MG/1
500-1000 TABLET, FILM COATED ORAL 3 TIMES DAILY PRN
Qty: 60 TABLET | Refills: 0 | Status: SHIPPED | OUTPATIENT
Start: 2024-11-19

## 2024-11-19 NOTE — TELEPHONE ENCOUNTER
Refill Request     Last Seen: 9/25/2024    Last Written: 11/22/24    Next Appointment:   Future Appointments   Date Time Provider Department Center   9/29/2025 11:30 AM Sofy Akins MD [facility] DE[facility]             Requested Prescriptions     Pending Prescriptions Disp Refills    methocarbamol (ROBAXIN) 500 MG tablet [Pharmacy Med Name: METHOCARBAMOL 500 MG TABLET] 60 tablet 0     Sig: TAKE 1-2 TABLETS BY MOUTH 3 TIMES DAILY AS NEEDED (BACK PAIN)

## 2024-12-02 DIAGNOSIS — F33.0 MILD EPISODE OF RECURRENT MAJOR DEPRESSIVE DISORDER (HCC): ICD-10-CM

## 2024-12-02 RX ORDER — LISINOPRIL 10 MG/1
10 TABLET ORAL DAILY
Qty: 90 TABLET | Refills: 3 | Status: SHIPPED | OUTPATIENT
Start: 2024-12-02

## 2024-12-02 NOTE — TELEPHONE ENCOUNTER
Refill Request     Last Seen: 9/25/2024    Last Written: 08/09/24    Next Appointment:   Future Appointments   Date Time Provider Department Center   9/29/2025 11:30 AM Sofy Akins MD [facility]             Requested Prescriptions     Pending Prescriptions Disp Refills    FLUoxetine (PROZAC) 20 MG capsule [Pharmacy Med Name: FLUOXETINE HCL 20 MG CAPSULE] 270 capsule 0     Sig: TAKE 3 CAPSULES BY MOUTH EVERY DAY

## 2025-01-02 DIAGNOSIS — M54.50 CHRONIC RIGHT-SIDED LOW BACK PAIN, UNSPECIFIED WHETHER SCIATICA PRESENT: ICD-10-CM

## 2025-01-02 DIAGNOSIS — M54.50 ACUTE LEFT-SIDED LOW BACK PAIN, UNSPECIFIED WHETHER SCIATICA PRESENT: ICD-10-CM

## 2025-01-02 DIAGNOSIS — G89.29 CHRONIC RIGHT-SIDED LOW BACK PAIN, UNSPECIFIED WHETHER SCIATICA PRESENT: ICD-10-CM

## 2025-01-02 RX ORDER — METHOCARBAMOL 500 MG/1
500-1000 TABLET, FILM COATED ORAL 3 TIMES DAILY PRN
Qty: 60 TABLET | Refills: 0 | Status: SHIPPED | OUTPATIENT
Start: 2025-01-02

## 2025-01-02 NOTE — TELEPHONE ENCOUNTER
Refill Request       Last Seen: Last Seen Department: 9/25/2024  Last Seen by PCP: 9/26/2023    Last Written: 11/19/24 60 with 0    Next Appointment:   Future Appointments   Date Time Provider Department Center   9/29/2025 11:30 AM Sofy Akins MD [facility]       Future appointment scheduled      Requested Prescriptions     Pending Prescriptions Disp Refills    methocarbamol (ROBAXIN) 500 MG tablet [Pharmacy Med Name: METHOCARBAMOL 500 MG TABLET] 60 tablet 0     Sig: TAKE 1-2 TABLETS BY MOUTH 3 TIMES DAILY AS NEEDED (BACK PAIN)

## 2025-02-07 DIAGNOSIS — M54.50 ACUTE LEFT-SIDED LOW BACK PAIN, UNSPECIFIED WHETHER SCIATICA PRESENT: ICD-10-CM

## 2025-02-07 DIAGNOSIS — M54.50 CHRONIC RIGHT-SIDED LOW BACK PAIN, UNSPECIFIED WHETHER SCIATICA PRESENT: ICD-10-CM

## 2025-02-07 DIAGNOSIS — G89.29 CHRONIC RIGHT-SIDED LOW BACK PAIN, UNSPECIFIED WHETHER SCIATICA PRESENT: ICD-10-CM

## 2025-02-07 RX ORDER — METHOCARBAMOL 500 MG/1
500-1000 TABLET, FILM COATED ORAL 3 TIMES DAILY PRN
Qty: 60 TABLET | Refills: 0 | Status: SHIPPED | OUTPATIENT
Start: 2025-02-07

## 2025-03-01 DIAGNOSIS — F33.0 MILD EPISODE OF RECURRENT MAJOR DEPRESSIVE DISORDER: ICD-10-CM

## 2025-03-03 NOTE — TELEPHONE ENCOUNTER
Refill Request     Last Seen: 9/25/2024    Last Written: 12/02/24    Next Appointment:   Future Appointments   Date Time Provider Department Center   9/29/2025 11:30 AM Sofy Akins MD [facility]             Requested Prescriptions     Pending Prescriptions Disp Refills    FLUoxetine (PROZAC) 20 MG capsule [Pharmacy Med Name: FLUOXETINE HCL 20 MG CAPSULE] 270 capsule 0     Sig: TAKE 3 CAPSULES BY MOUTH EVERY DAY

## 2025-03-07 DIAGNOSIS — M54.50 CHRONIC RIGHT-SIDED LOW BACK PAIN, UNSPECIFIED WHETHER SCIATICA PRESENT: ICD-10-CM

## 2025-03-07 DIAGNOSIS — M54.50 ACUTE LEFT-SIDED LOW BACK PAIN, UNSPECIFIED WHETHER SCIATICA PRESENT: ICD-10-CM

## 2025-03-07 DIAGNOSIS — G89.29 CHRONIC RIGHT-SIDED LOW BACK PAIN, UNSPECIFIED WHETHER SCIATICA PRESENT: ICD-10-CM

## 2025-03-07 RX ORDER — METHOCARBAMOL 500 MG/1
500-1000 TABLET, FILM COATED ORAL 3 TIMES DAILY PRN
Qty: 60 TABLET | Refills: 0 | Status: SHIPPED | OUTPATIENT
Start: 2025-03-07

## 2025-03-07 NOTE — TELEPHONE ENCOUNTER
Refill Request       Last Seen: Last Seen Department: 9/25/2024  Last Seen by PCP: Visit date not found    Last Written: 2/7/25 60 with 0    Next Appointment:   Future Appointments   Date Time Provider Department Center   9/29/2025 11:30 AM Sofy Akins MD [facility]       Future appointment scheduled      Requested Prescriptions     Pending Prescriptions Disp Refills    methocarbamol (ROBAXIN) 500 MG tablet [Pharmacy Med Name: METHOCARBAMOL 500 MG TABLET] 60 tablet 0     Sig: TAKE 1-2 TABLETS BY MOUTH 3 TIMES DAILY AS NEEDED (BACK PAIN)

## 2025-04-03 RX ORDER — ATORVASTATIN CALCIUM 10 MG/1
10 TABLET, FILM COATED ORAL DAILY
Qty: 30 TABLET | Refills: 5 | Status: SHIPPED | OUTPATIENT
Start: 2025-04-03

## 2025-04-03 NOTE — TELEPHONE ENCOUNTER
Refill Request       Last Seen: Last Seen Department: 9/25/2024  Last Seen by PCP: 9/26/2023    Last Written: 10/2/24 30 with 5    Next Appointment:   Future Appointments   Date Time Provider Department Center   9/29/2025 11:30 AM Sofy Akins MD [facility]       Future appointment scheduled      Requested Prescriptions     Pending Prescriptions Disp Refills    atorvastatin (LIPITOR) 10 MG tablet [Pharmacy Med Name: ATORVASTATIN 10 MG TABLET] 30 tablet 5     Sig: TAKE 1 TABLET BY MOUTH EVERY DAY

## 2025-04-08 DIAGNOSIS — M54.50 ACUTE LEFT-SIDED LOW BACK PAIN, UNSPECIFIED WHETHER SCIATICA PRESENT: ICD-10-CM

## 2025-04-08 DIAGNOSIS — G89.29 CHRONIC RIGHT-SIDED LOW BACK PAIN, UNSPECIFIED WHETHER SCIATICA PRESENT: ICD-10-CM

## 2025-04-08 DIAGNOSIS — M54.50 CHRONIC RIGHT-SIDED LOW BACK PAIN, UNSPECIFIED WHETHER SCIATICA PRESENT: ICD-10-CM

## 2025-04-08 RX ORDER — METHOCARBAMOL 500 MG/1
500-1000 TABLET, FILM COATED ORAL 3 TIMES DAILY PRN
Qty: 60 TABLET | Refills: 0 | Status: SHIPPED | OUTPATIENT
Start: 2025-04-08

## 2025-04-08 NOTE — TELEPHONE ENCOUNTER
Refill Request       Last Seen: Last Seen Department: 9/25/2024  Last Seen by PCP: 9/26/2023    Last Written: 3/7/25 60 0 refills     Next Appointment:   Future Appointments   Date Time Provider Department Center   9/29/2025 11:30 AM Sofy Akins MD [facility] DE[facility]             Requested Prescriptions     Pending Prescriptions Disp Refills    methocarbamol (ROBAXIN) 500 MG tablet 60 tablet 0     Sig: Take 1-2 tablets by mouth 3 times daily as needed (back pain)

## 2025-04-08 NOTE — TELEPHONE ENCOUNTER
Patient is calling requesting a refill of (methocarbamol (ROBAXIN) 500 MG tablet ) please send to (  [id number]   ) patients last OV (date of last visit) and next OV (date of next visit).   please advise patient once medication is sent  Patients Name: juliane gonzalez   Patients phone number:[phone]

## 2025-05-16 DIAGNOSIS — M54.50 ACUTE LEFT-SIDED LOW BACK PAIN, UNSPECIFIED WHETHER SCIATICA PRESENT: ICD-10-CM

## 2025-05-16 DIAGNOSIS — M54.50 CHRONIC RIGHT-SIDED LOW BACK PAIN, UNSPECIFIED WHETHER SCIATICA PRESENT: ICD-10-CM

## 2025-05-16 DIAGNOSIS — G89.29 CHRONIC RIGHT-SIDED LOW BACK PAIN, UNSPECIFIED WHETHER SCIATICA PRESENT: ICD-10-CM

## 2025-05-16 RX ORDER — METHOCARBAMOL 500 MG/1
500-1000 TABLET, FILM COATED ORAL 3 TIMES DAILY PRN
Qty: 60 TABLET | Refills: 0 | Status: SHIPPED | OUTPATIENT
Start: 2025-05-16

## 2025-05-16 NOTE — TELEPHONE ENCOUNTER
Refill Request       Last Seen: Last Seen Department: 9/25/2024  Last Seen by PCP: Visit date not found    Last Written: 4/8/25 60 with 0    Next Appointment:   Future Appointments   Date Time Provider Department Center   9/29/2025 11:30 AM Sofy Akins MD [facility]       Future appointment scheduled      Requested Prescriptions     Pending Prescriptions Disp Refills    methocarbamol (ROBAXIN) 500 MG tablet [Pharmacy Med Name: METHOCARBAMOL 500 MG TABLET] 60 tablet 0     Sig: TAKE 1-2 TABLETS BY MOUTH 3 TIMES DAILY AS NEEDED (BACK PAIN)

## 2025-06-03 DIAGNOSIS — F33.0 MILD EPISODE OF RECURRENT MAJOR DEPRESSIVE DISORDER: ICD-10-CM

## 2025-06-03 NOTE — TELEPHONE ENCOUNTER
Refill Request       Last Seen: Last Seen Department: 9/25/2024  Last Seen by PCP: Visit date not found    Last Written: 3/3/25 270 with 0    Next Appointment:   Future Appointments   Date Time Provider Department Center   9/29/2025 11:30 AM Sofy Akins MD [facility]       Future appointment scheduled      Requested Prescriptions     Pending Prescriptions Disp Refills    FLUoxetine (PROZAC) 20 MG capsule [Pharmacy Med Name: FLUOXETINE HCL 20 MG CAPSULE] 270 capsule 0     Sig: TAKE 3 CAPSULES BY MOUTH EVERY DAY

## 2025-06-19 DIAGNOSIS — M54.50 CHRONIC RIGHT-SIDED LOW BACK PAIN, UNSPECIFIED WHETHER SCIATICA PRESENT: ICD-10-CM

## 2025-06-19 DIAGNOSIS — G89.29 CHRONIC RIGHT-SIDED LOW BACK PAIN, UNSPECIFIED WHETHER SCIATICA PRESENT: ICD-10-CM

## 2025-06-19 DIAGNOSIS — M54.50 ACUTE LEFT-SIDED LOW BACK PAIN, UNSPECIFIED WHETHER SCIATICA PRESENT: ICD-10-CM

## 2025-06-19 RX ORDER — METHOCARBAMOL 500 MG/1
500-1000 TABLET, FILM COATED ORAL 3 TIMES DAILY PRN
Qty: 60 TABLET | Refills: 0 | Status: SHIPPED | OUTPATIENT
Start: 2025-06-19

## 2025-06-19 NOTE — TELEPHONE ENCOUNTER
Refill Request       Last Seen: Last Seen Department: 9/25/2024  Last Seen by PCP: Visit date not found    Last Written: 5/16/25 60 with 0    Next Appointment:   Future Appointments   Date Time Provider Department Center   9/29/2025 11:30 AM Sofy Akins MD [facility] DE[facility]       Future appointment scheduled      Requested Prescriptions     Pending Prescriptions Disp Refills    methocarbamol (ROBAXIN) 500 MG tablet [Pharmacy Med Name: METHOCARBAMOL 500 MG TABLET] 60 tablet 0     Sig: TAKE 1-2 TABLETS BY MOUTH 3 TIMES DAILY AS NEEDED (BACK PAIN)

## 2025-07-17 DIAGNOSIS — M54.50 CHRONIC RIGHT-SIDED LOW BACK PAIN, UNSPECIFIED WHETHER SCIATICA PRESENT: ICD-10-CM

## 2025-07-17 DIAGNOSIS — G89.29 CHRONIC RIGHT-SIDED LOW BACK PAIN, UNSPECIFIED WHETHER SCIATICA PRESENT: ICD-10-CM

## 2025-07-17 DIAGNOSIS — M54.50 ACUTE LEFT-SIDED LOW BACK PAIN, UNSPECIFIED WHETHER SCIATICA PRESENT: ICD-10-CM

## 2025-07-17 RX ORDER — METHOCARBAMOL 500 MG/1
500-1000 TABLET, FILM COATED ORAL 3 TIMES DAILY PRN
Qty: 60 TABLET | Refills: 0 | Status: SHIPPED | OUTPATIENT
Start: 2025-07-17

## 2025-07-17 NOTE — TELEPHONE ENCOUNTER
Refill Request       Last Seen: Last Seen Department: 9/25/2024  Last Seen by PCP: Visit date not found    Last Written: 6/19/25 60 with 0    Next Appointment:   Future Appointments   Date Time Provider Department Center   9/29/2025 11:30 AM Sofy Akins MD [facility] DE[facility]       Future appointment scheduled      Requested Prescriptions     Pending Prescriptions Disp Refills    methocarbamol (ROBAXIN) 500 MG tablet [Pharmacy Med Name: METHOCARBAMOL 500 MG TABLET] 60 tablet 0     Sig: TAKE 1-2 TABLETS BY MOUTH 3 TIMES DAILY AS NEEDED (BACK PAIN)

## 2025-08-12 DIAGNOSIS — M54.50 CHRONIC RIGHT-SIDED LOW BACK PAIN, UNSPECIFIED WHETHER SCIATICA PRESENT: ICD-10-CM

## 2025-08-12 DIAGNOSIS — G89.29 CHRONIC RIGHT-SIDED LOW BACK PAIN, UNSPECIFIED WHETHER SCIATICA PRESENT: ICD-10-CM

## 2025-08-12 DIAGNOSIS — M54.50 ACUTE LEFT-SIDED LOW BACK PAIN, UNSPECIFIED WHETHER SCIATICA PRESENT: ICD-10-CM

## 2025-08-13 RX ORDER — METHOCARBAMOL 500 MG/1
500-1000 TABLET, FILM COATED ORAL 3 TIMES DAILY PRN
Qty: 60 TABLET | Refills: 0 | Status: SHIPPED | OUTPATIENT
Start: 2025-08-13

## 2025-08-30 DIAGNOSIS — F33.0 MILD EPISODE OF RECURRENT MAJOR DEPRESSIVE DISORDER: ICD-10-CM

## 2025-08-31 DIAGNOSIS — G89.29 CHRONIC RIGHT-SIDED LOW BACK PAIN, UNSPECIFIED WHETHER SCIATICA PRESENT: ICD-10-CM

## 2025-08-31 DIAGNOSIS — M54.50 CHRONIC RIGHT-SIDED LOW BACK PAIN, UNSPECIFIED WHETHER SCIATICA PRESENT: ICD-10-CM

## 2025-08-31 DIAGNOSIS — M54.50 ACUTE LEFT-SIDED LOW BACK PAIN, UNSPECIFIED WHETHER SCIATICA PRESENT: ICD-10-CM

## 2025-09-02 RX ORDER — METHOCARBAMOL 500 MG/1
500-1000 TABLET, FILM COATED ORAL 3 TIMES DAILY PRN
Qty: 60 TABLET | Refills: 0 | Status: SHIPPED | OUTPATIENT
Start: 2025-09-02

## (undated) DEVICE — PILLOW POS W15XH6XL22IN RASPBERRY FOAM ABD W/ STRP DISP FOR

## (undated) DEVICE — GLOVE ORTHO 7   MSG9470

## (undated) DEVICE — TAPE SUT SM CTX 2.2 MM 40 IN 1/2 CIR TAPER WHT XBRAID TT

## (undated) DEVICE — SUTURE VCRL SZ 0 L36IN ABSRB UD CT-1 L36MM 1/2 CIR TAPR PNT VCP946H

## (undated) DEVICE — OPTIFOAM GENTLE SA, POSTOP, 4X12: Brand: MEDLINE

## (undated) DEVICE — DRESSING FOAM W8XL12IN THN ABSRB SELF ADH BORD MEPILEX

## (undated) DEVICE — ADHESIVE SKIN CLSR 0.7ML TOP DERMBND ADV

## (undated) DEVICE — ELECTRODE,RADIOTRANSLUCENT,FOAM,3PK: Brand: MEDLINE

## (undated) DEVICE — NEEDLE HYPO 20GA L1.5IN YEL POLYPR HUB S STL REG BVL STR

## (undated) DEVICE — SYRINGE MED 30ML STD CLR PLAS LUERLOCK TIP N CTRL DISP

## (undated) DEVICE — PENCIL SMK EVAC ALL IN 1 DSGN ENH VISIBILITY IMPROVED AIR

## (undated) DEVICE — DRAPE C ARM W46XL120IN XLN

## (undated) DEVICE — SUTURE STRATAFIX SYMMETRIC SZ 1 L18IN ABSRB VLT CT1 L36CM SXPP1A404

## (undated) DEVICE — HOOD: Brand: FLYTE

## (undated) DEVICE — SUTURE MCRYL SZ 3-0 L27IN ABSRB UD L19MM PS-2 3/8 CIR PRIM Y427H

## (undated) DEVICE — GLOVE SURG SZ 75 L12IN FNGR THK79MIL GRN LTX FREE

## (undated) DEVICE — ENDO CARRY-ON PROCEDURE KIT INCLUDES SUCTION TUBING, LUBRICANT, GAUZE, BIOHAZARD STICKER, TRANSPORT PAD AND INTERCEPT BEDSIDE KIT.: Brand: ENDO CARRY-ON PROCEDURE KIT

## (undated) DEVICE — SUTURE VCRL + SZ 2-0 L36IN ABSRB UD L36MM CT-1 1/2 CIR VCP945H

## (undated) DEVICE — Device

## (undated) DEVICE — HOOD, PEEL-AWAY: Brand: FLYTE